# Patient Record
Sex: FEMALE | Race: WHITE | NOT HISPANIC OR LATINO | Employment: FULL TIME | URBAN - METROPOLITAN AREA
[De-identification: names, ages, dates, MRNs, and addresses within clinical notes are randomized per-mention and may not be internally consistent; named-entity substitution may affect disease eponyms.]

---

## 2017-05-26 ENCOUNTER — ALLSCRIPTS OFFICE VISIT (OUTPATIENT)
Dept: OTHER | Facility: OTHER | Age: 57
End: 2017-05-26

## 2017-05-26 DIAGNOSIS — Z12.31 ENCOUNTER FOR SCREENING MAMMOGRAM FOR MALIGNANT NEOPLASM OF BREAST: ICD-10-CM

## 2018-01-14 VITALS
HEIGHT: 66 IN | DIASTOLIC BLOOD PRESSURE: 78 MMHG | WEIGHT: 164 LBS | SYSTOLIC BLOOD PRESSURE: 112 MMHG | BODY MASS INDEX: 26.36 KG/M2

## 2018-02-16 ENCOUNTER — OFFICE VISIT (OUTPATIENT)
Dept: FAMILY MEDICINE CLINIC | Facility: CLINIC | Age: 58
End: 2018-02-16
Payer: COMMERCIAL

## 2018-02-16 ENCOUNTER — APPOINTMENT (OUTPATIENT)
Dept: LAB | Facility: HOSPITAL | Age: 58
End: 2018-02-16
Payer: COMMERCIAL

## 2018-02-16 VITALS
BODY MASS INDEX: 27.5 KG/M2 | HEART RATE: 78 BPM | OXYGEN SATURATION: 96 % | WEIGHT: 167.8 LBS | TEMPERATURE: 98.1 F | SYSTOLIC BLOOD PRESSURE: 122 MMHG | DIASTOLIC BLOOD PRESSURE: 78 MMHG

## 2018-02-16 DIAGNOSIS — J02.9 SORE THROAT: ICD-10-CM

## 2018-02-16 DIAGNOSIS — Z13.1 SCREENING FOR DIABETES MELLITUS: ICD-10-CM

## 2018-02-16 DIAGNOSIS — Z13.220 SCREENING, LIPID: ICD-10-CM

## 2018-02-16 DIAGNOSIS — J02.9 SORE THROAT: Primary | ICD-10-CM

## 2018-02-16 DIAGNOSIS — R63.5 WEIGHT GAIN: ICD-10-CM

## 2018-02-16 DIAGNOSIS — Z12.31 SCREENING MAMMOGRAM, ENCOUNTER FOR: ICD-10-CM

## 2018-02-16 PROCEDURE — 99203 OFFICE O/P NEW LOW 30 MIN: CPT | Performed by: FAMILY MEDICINE

## 2018-02-16 PROCEDURE — 87070 CULTURE OTHR SPECIMN AEROBIC: CPT

## 2018-02-16 RX ORDER — AMOXICILLIN 875 MG/1
TABLET, COATED ORAL
Refills: 0 | COMMUNITY
Start: 2018-02-12 | End: 2018-02-16 | Stop reason: ALTCHOICE

## 2018-02-16 RX ORDER — CEFDINIR 300 MG/1
300 CAPSULE ORAL EVERY 12 HOURS SCHEDULED
Qty: 20 CAPSULE | Refills: 0 | Status: SHIPPED | OUTPATIENT
Start: 2018-02-16 | End: 2018-02-26

## 2018-02-16 NOTE — PROGRESS NOTES
Assessment/Plan:     Diagnoses and all orders for this visit:    Sore throat  -     Throat culture; Future  -     cefdinir (OMNICEF) 300 mg capsule; Take 1 capsule (300 mg total) by mouth every 12 (twelve) hours for 10 days    Screening, lipid  -     Lipid panel; Future    Screening for diabetes mellitus  -     Comprehensive metabolic panel; Future    Weight gain  -     TSH baseline; Future  -     CBC and differential; Future    Screening mammogram, encounter for  -     Mammo screening bilateral w cad; Future    Other orders  -     Discontinue: amoxicillin (AMOXIL) 875 mg tablet; take 1 tablet by mouth twice a day for 10 days      Switch abx to Cefdinir  Use Ibuprofen 600 mg TID  Throat culture  If symptoms persist or continue to recur, will refer to ENT  Screening labs and mammogram    Subjective:      Patient ID: Yissel Colón is a 65 y o  female  She is here to establish care  Per patient, she has a h/o recurrent strep throat  She had an episode was in November 2017  Rapid strep was positive (results in chart from [facility])  She also had strep last year in 2016 - I don't see results in chart  She was seen in Urgent Care this past Monday 2/12/18 and diagnosed with strep again and now is on Amoxicillin 875 mg BID  Reviewed notes - her rapid strep was negative but she was treated empirically  She is complaining of swelling in the throat, painful swallowing, hoarseness  Mucus drainage  Dry throat  No cough  No fever  Denies GERD  Would also like to get blood work updated  Sore Throat    Associated symptoms include congestion  Pertinent negatives include no coughing or shortness of breath         The following portions of the patient's history were reviewed and updated as appropriate: allergies, current medications, past family history, past medical history, past social history, past surgical history and problem list     Review of Systems   Constitutional: Negative for activity change, appetite change, chills and fever  HENT: Positive for congestion, postnasal drip and sore throat  Eyes: Negative  Respiratory: Negative for cough, shortness of breath and wheezing  Hematological: Positive for adenopathy  Objective:      /78   Pulse 78   Temp 98 1 °F (36 7 °C)   Wt 76 1 kg (167 lb 12 8 oz)   SpO2 96%   BMI 27 50 kg/m²          Physical Exam   Constitutional: She is oriented to person, place, and time  She appears well-developed and well-nourished  No distress  HENT:   Right Ear: Hearing, tympanic membrane, external ear and ear canal normal    Left Ear: Hearing, tympanic membrane, external ear and ear canal normal    Nose: Nose normal    Mouth/Throat: Uvula is midline  Posterior oropharyngeal erythema present  No oropharyngeal exudate or posterior oropharyngeal edema  Eyes: Conjunctivae are normal  Pupils are equal, round, and reactive to light  Cardiovascular: Normal rate and normal heart sounds  Exam reveals no gallop and no friction rub  No murmur heard  Pulmonary/Chest: Effort normal and breath sounds normal  No respiratory distress  She has no wheezes  She has no rales  Lymphadenopathy:     She has cervical adenopathy  Neurological: She is alert and oriented to person, place, and time  Skin: Skin is warm  No rash noted  Psychiatric: She has a normal mood and affect  Her behavior is normal  Judgment and thought content normal    Nursing note and vitals reviewed          RAPID STREP A POCT (11/02/2017 4:20 PM)  RAPID STREP A POCT (11/02/2017 4:20 PM)   Component Value Ref Range   Rapid Strep A Screen Positive (A) Negative     RAPID STREP A POCT (02/12/2018 8:48 AM)  RAPID STREP A POCT (02/12/2018 8:48 AM)   Component Value Ref Range   Rapid Strep A Screen Negative Negative

## 2018-02-18 LAB — BACTERIA THROAT CULT: NORMAL

## 2018-03-02 ENCOUNTER — APPOINTMENT (OUTPATIENT)
Dept: LAB | Facility: CLINIC | Age: 58
End: 2018-03-02
Payer: COMMERCIAL

## 2018-03-02 ENCOUNTER — OFFICE VISIT (OUTPATIENT)
Dept: FAMILY MEDICINE CLINIC | Facility: CLINIC | Age: 58
End: 2018-03-02
Payer: COMMERCIAL

## 2018-03-02 VITALS
TEMPERATURE: 97.7 F | OXYGEN SATURATION: 96 % | WEIGHT: 167 LBS | BODY MASS INDEX: 26.84 KG/M2 | SYSTOLIC BLOOD PRESSURE: 118 MMHG | HEART RATE: 80 BPM | DIASTOLIC BLOOD PRESSURE: 78 MMHG | HEIGHT: 66 IN

## 2018-03-02 DIAGNOSIS — N89.8 VAGINAL DISCHARGE: ICD-10-CM

## 2018-03-02 DIAGNOSIS — Z13.1 SCREENING FOR DIABETES MELLITUS: ICD-10-CM

## 2018-03-02 DIAGNOSIS — Z01.419 NORMAL PELVIC EXAM: Primary | ICD-10-CM

## 2018-03-02 DIAGNOSIS — Z13.220 SCREENING, LIPID: ICD-10-CM

## 2018-03-02 DIAGNOSIS — R63.5 WEIGHT GAIN: ICD-10-CM

## 2018-03-02 LAB
ALBUMIN SERPL BCP-MCNC: 3.9 G/DL (ref 3.5–5)
ALP SERPL-CCNC: 76 U/L (ref 46–116)
ALT SERPL W P-5'-P-CCNC: 45 U/L (ref 12–78)
ANION GAP SERPL CALCULATED.3IONS-SCNC: 7 MMOL/L (ref 4–13)
AST SERPL W P-5'-P-CCNC: 16 U/L (ref 5–45)
BASOPHILS # BLD AUTO: 0.04 THOUSANDS/ΜL (ref 0–0.1)
BASOPHILS NFR BLD AUTO: 1 % (ref 0–1)
BILIRUB SERPL-MCNC: 0.42 MG/DL (ref 0.2–1)
BUN SERPL-MCNC: 13 MG/DL (ref 5–25)
CALCIUM SERPL-MCNC: 9.1 MG/DL (ref 8.3–10.1)
CHLORIDE SERPL-SCNC: 109 MMOL/L (ref 100–108)
CHOLEST SERPL-MCNC: 201 MG/DL (ref 50–200)
CO2 SERPL-SCNC: 26 MMOL/L (ref 21–32)
CREAT SERPL-MCNC: 0.66 MG/DL (ref 0.6–1.3)
EOSINOPHIL # BLD AUTO: 0.2 THOUSAND/ΜL (ref 0–0.61)
EOSINOPHIL NFR BLD AUTO: 2 % (ref 0–6)
ERYTHROCYTE [DISTWIDTH] IN BLOOD BY AUTOMATED COUNT: 12.5 % (ref 11.6–15.1)
GFR SERPL CREATININE-BSD FRML MDRD: 99 ML/MIN/1.73SQ M
GLUCOSE P FAST SERPL-MCNC: 87 MG/DL (ref 65–99)
HCT VFR BLD AUTO: 38.1 % (ref 34.8–46.1)
HDLC SERPL-MCNC: 45 MG/DL (ref 40–60)
HGB BLD-MCNC: 12.8 G/DL (ref 11.5–15.4)
LDLC SERPL CALC-MCNC: 131 MG/DL (ref 0–100)
LYMPHOCYTES # BLD AUTO: 2.16 THOUSANDS/ΜL (ref 0.6–4.47)
LYMPHOCYTES NFR BLD AUTO: 26 % (ref 14–44)
MCH RBC QN AUTO: 29.9 PG (ref 26.8–34.3)
MCHC RBC AUTO-ENTMCNC: 33.6 G/DL (ref 31.4–37.4)
MCV RBC AUTO: 89 FL (ref 82–98)
MONOCYTES # BLD AUTO: 0.43 THOUSAND/ΜL (ref 0.17–1.22)
MONOCYTES NFR BLD AUTO: 5 % (ref 4–12)
NEUTROPHILS # BLD AUTO: 5.55 THOUSANDS/ΜL (ref 1.85–7.62)
NEUTS SEG NFR BLD AUTO: 66 % (ref 43–75)
NRBC BLD AUTO-RTO: 0 /100 WBCS
PLATELET # BLD AUTO: 306 THOUSANDS/UL (ref 149–390)
PMV BLD AUTO: 11.2 FL (ref 8.9–12.7)
POTASSIUM SERPL-SCNC: 3.9 MMOL/L (ref 3.5–5.3)
PROT SERPL-MCNC: 7.5 G/DL (ref 6.4–8.2)
RBC # BLD AUTO: 4.28 MILLION/UL (ref 3.81–5.12)
SODIUM SERPL-SCNC: 142 MMOL/L (ref 136–145)
TRIGL SERPL-MCNC: 123 MG/DL
TSH SERPL DL<=0.05 MIU/L-ACNC: 1.19 UIU/ML
WBC # BLD AUTO: 8.43 THOUSAND/UL (ref 4.31–10.16)

## 2018-03-02 PROCEDURE — 99213 OFFICE O/P EST LOW 20 MIN: CPT | Performed by: FAMILY MEDICINE

## 2018-03-02 PROCEDURE — 87070 CULTURE OTHR SPECIMN AEROBIC: CPT | Performed by: FAMILY MEDICINE

## 2018-03-02 PROCEDURE — 80053 COMPREHEN METABOLIC PANEL: CPT

## 2018-03-02 PROCEDURE — 85025 COMPLETE CBC W/AUTO DIFF WBC: CPT

## 2018-03-02 PROCEDURE — 84443 ASSAY THYROID STIM HORMONE: CPT

## 2018-03-02 PROCEDURE — 36415 COLL VENOUS BLD VENIPUNCTURE: CPT

## 2018-03-02 PROCEDURE — 80061 LIPID PANEL: CPT

## 2018-03-02 NOTE — PROGRESS NOTES
Edis Fulton is a 65 y o  female here for a routine exam   Current complaints: none  Personal health questionnaire reviewed: yes  Gynecologic History  No LMP recorded  Contraception: post menopausal status  Last mammogram: year ago  Results were: normal    Obstetric History  OB History   No data available         The following portions of the patient's history were reviewed and updated as appropriate: allergies, current medications, past family history, past medical history, past social history, past surgical history and problem list     Review of Systems  Constitutional: negative  Eyes: negative  Ears, nose, mouth, throat, and face: positive for post nasal drip  Genitourinary:negative  Integument/breast: negative      Objective     /78   Pulse 80   Temp 97 7 °F (36 5 °C)   Ht 5' 6" (1 676 m)   Wt 75 8 kg (167 lb)   SpO2 96%   BMI 26 95 kg/m²   General appearance: alert and oriented, in no acute distress  Throat: lips, mucosa, and tongue normal; teeth and gums normal  Breasts: normal appearance, no masses or tenderness  Abdomen: soft, non-tender; bowel sounds normal; no masses,  no organomegaly  Pelvic: external genitalia normal, no adnexal masses or tenderness, no bladder tenderness, positive findings: vaginal discharge: white and urethral meatus CERVIX SURGICALLY ABSENT    Assessment  Eva Pratt was seen today for gynecologic exam     Diagnoses and all orders for this visit:    Normal pelvic exam    Vaginal discharge  -     Genital Comprehensive Culture; Future    Pelvic exam normal  Sent culture  No pap (cervix surgically absent)  Has mammogram order  Healthy female exam       Plan     Education reviewed: safe sex/STD prevention and self breast exams  Mammogram ordered  Follow up in: 1 years

## 2018-03-04 LAB — BACTERIA GENITAL AEROBE CULT: NORMAL

## 2018-07-06 ENCOUNTER — TRANSCRIBE ORDERS (OUTPATIENT)
Dept: ADMINISTRATIVE | Facility: HOSPITAL | Age: 58
End: 2018-07-06

## 2018-07-06 ENCOUNTER — OFFICE VISIT (OUTPATIENT)
Dept: FAMILY MEDICINE CLINIC | Facility: CLINIC | Age: 58
End: 2018-07-06
Payer: COMMERCIAL

## 2018-07-06 ENCOUNTER — APPOINTMENT (OUTPATIENT)
Dept: RADIOLOGY | Facility: CLINIC | Age: 58
End: 2018-07-06
Payer: COMMERCIAL

## 2018-07-06 VITALS
TEMPERATURE: 97.8 F | HEIGHT: 66 IN | WEIGHT: 165 LBS | HEART RATE: 72 BPM | OXYGEN SATURATION: 95 % | BODY MASS INDEX: 26.52 KG/M2 | DIASTOLIC BLOOD PRESSURE: 86 MMHG | SYSTOLIC BLOOD PRESSURE: 126 MMHG

## 2018-07-06 DIAGNOSIS — Z12.11 COLON CANCER SCREENING: ICD-10-CM

## 2018-07-06 DIAGNOSIS — M54.5 CHRONIC BILATERAL LOW BACK PAIN, WITH SCIATICA PRESENCE UNSPECIFIED: ICD-10-CM

## 2018-07-06 DIAGNOSIS — G89.29 CHRONIC BILATERAL LOW BACK PAIN, WITH SCIATICA PRESENCE UNSPECIFIED: ICD-10-CM

## 2018-07-06 DIAGNOSIS — M41.9 SCOLIOSIS OF THORACIC SPINE, UNSPECIFIED SCOLIOSIS TYPE: ICD-10-CM

## 2018-07-06 DIAGNOSIS — F32.89 OTHER DEPRESSION: ICD-10-CM

## 2018-07-06 DIAGNOSIS — Z12.31 SCREENING MAMMOGRAM, ENCOUNTER FOR: ICD-10-CM

## 2018-07-06 DIAGNOSIS — M79.604 RIGHT LEG PAIN: Primary | ICD-10-CM

## 2018-07-06 DIAGNOSIS — M79.604 RIGHT LEG PAIN: ICD-10-CM

## 2018-07-06 PROBLEM — Z01.419 NORMAL PELVIC EXAM: Status: RESOLVED | Noted: 2018-03-02 | Resolved: 2018-07-06

## 2018-07-06 PROBLEM — N89.8 VAGINAL DISCHARGE: Status: RESOLVED | Noted: 2018-03-02 | Resolved: 2018-07-06

## 2018-07-06 PROBLEM — M54.50 CHRONIC BILATERAL LOW BACK PAIN: Status: ACTIVE | Noted: 2018-07-06

## 2018-07-06 PROBLEM — F32.A DEPRESSION: Status: ACTIVE | Noted: 2018-07-06

## 2018-07-06 PROCEDURE — 72072 X-RAY EXAM THORAC SPINE 3VWS: CPT

## 2018-07-06 PROCEDURE — 99214 OFFICE O/P EST MOD 30 MIN: CPT | Performed by: FAMILY MEDICINE

## 2018-07-06 PROCEDURE — 3008F BODY MASS INDEX DOCD: CPT | Performed by: FAMILY MEDICINE

## 2018-07-06 PROCEDURE — 72110 X-RAY EXAM L-2 SPINE 4/>VWS: CPT

## 2018-07-06 RX ORDER — CITALOPRAM 20 MG/1
20 TABLET ORAL DAILY
Qty: 30 TABLET | Refills: 2 | Status: SHIPPED | OUTPATIENT
Start: 2018-07-06

## 2018-07-06 RX ORDER — MELOXICAM 7.5 MG/1
7.5 TABLET ORAL DAILY
Qty: 30 TABLET | Refills: 0 | Status: SHIPPED | OUTPATIENT
Start: 2018-07-06 | End: 2019-08-12

## 2018-07-06 NOTE — PROGRESS NOTES
Assessment/Plan:     Diagnoses and all orders for this visit:    Right leg pain  -     XR spine thoracic 3 vw; Future  -     XR spine lumbar minimum 4 views non injury; Future  -     meloxicam (MOBIC) 7 5 mg tablet; Take 1 tablet (7 5 mg total) by mouth daily  -     Ambulatory referral to Physical Therapy; Future    Scoliosis of thoracic spine, unspecified scoliosis type  -     XR spine thoracic 3 vw; Future  -     XR spine lumbar minimum 4 views non injury; Future  -     Ambulatory referral to Physical Therapy; Future    Other depression  -     citalopram (CeleXA) 20 mg tablet; Take 1 tablet (20 mg total) by mouth daily    Colon cancer screening  -     Ambulatory referral to Colorectal Surgery; Future    Screening mammogram, encounter for  -     Mammo screening bilateral w 3d & cad; Future    Chronic bilateral low back pain, with sciatica presence unspecified  -     meloxicam (MOBIC) 7 5 mg tablet; Take 1 tablet (7 5 mg total) by mouth daily  -     Ambulatory referral to Physical Therapy; Future        Check Xrays, start Mobic and PT  Trial of conservative therapy if not improving may need to refer to ortho/spine and pain  Restart Celexa for depression  She can cut in half for a week to take 10 mg then increase to 20 mg  Subjective:      Patient ID: Leann Hines is a 65 y o  female  She states she has "leg pain from scoliosis"  She states she was diagnosed with scoliosis as a child and wore a brace  Now having R leg pain, occurs with weight bearing or at rest   Starts at the hip and radiates down to the ankle  She has lower back pain as well  She describes the pain as a "constant dull ache"  No numbness or tingling  Constant pain for 1 month  She has tried stretching which seems to make it worse  Tried ibuprofen which helps the back but not the leg  She would also like to go back on Celexa - she stopped it 15 months ago  She states her depression has been worse lately   She had been doing well up until recently  No suicidal/homicidal ideation          The following portions of the patient's history were reviewed and updated as appropriate: She  has a past medical history of Scoliosis; Strep throat; and Weight gain  She   Patient Active Problem List    Diagnosis Date Noted    Chronic bilateral low back pain 07/06/2018    Colon cancer screening 07/06/2018    Depression 07/06/2018    Scoliosis of thoracic spine 07/06/2018    Right leg pain 07/06/2018     She  has a past surgical history that includes Partial hysterectomy; Tubal ligation; Hysterectomy; and Breast lumpectomy  Her family history includes Multiple myeloma in her father; Thyroid cancer in her mother  She  reports that she has never smoked  She has never used smokeless tobacco  She reports that she drinks alcohol  She reports that she does not use drugs  Current Outpatient Prescriptions   Medication Sig Dispense Refill    citalopram (CeleXA) 20 mg tablet Take 1 tablet (20 mg total) by mouth daily 30 tablet 2    meloxicam (MOBIC) 7 5 mg tablet Take 1 tablet (7 5 mg total) by mouth daily 30 tablet 0     No current facility-administered medications for this visit  No current outpatient prescriptions on file prior to visit  No current facility-administered medications on file prior to visit  She is allergic to prednisone       Review of Systems   Constitutional: Negative for activity change  Respiratory: Negative for chest tightness and shortness of breath  Cardiovascular: Negative for chest pain and leg swelling  Musculoskeletal: Positive for back pain  Right leg pain   Neurological: Negative for headaches  Psychiatric/Behavioral: Positive for dysphoric mood  The patient is not nervous/anxious            Objective:      /86   Pulse 72   Temp 97 8 °F (36 6 °C)   Ht 5' 6" (1 676 m)   Wt 74 8 kg (165 lb)   SpO2 95%   BMI 26 63 kg/m²          Physical Exam   Constitutional: She is oriented to person, place, and time  She appears well-developed and well-nourished  No distress  Musculoskeletal:        Right hip: She exhibits normal range of motion, normal strength and no tenderness  Left hip: She exhibits normal range of motion, normal strength and no tenderness  Thoracic back: She exhibits deformity, pain and spasm  Lumbar back: She exhibits deformity and pain  She exhibits normal range of motion  Back:    Neurological: She is alert and oriented to person, place, and time  Skin: She is not diaphoretic  Psychiatric: She has a normal mood and affect  Her speech is normal and behavior is normal  Judgment and thought content normal  Cognition and memory are normal    Nursing note and vitals reviewed

## 2018-07-10 DIAGNOSIS — M41.25 OTHER IDIOPATHIC SCOLIOSIS, THORACOLUMBAR REGION: Primary | ICD-10-CM

## 2018-07-10 NOTE — PROGRESS NOTES
Pt aware, she took the info for this spine surgeon but will call back if she chooses to go somewhere else

## 2018-07-21 ENCOUNTER — OFFICE VISIT (OUTPATIENT)
Dept: URGENT CARE | Facility: CLINIC | Age: 58
End: 2018-07-21
Payer: COMMERCIAL

## 2018-07-21 VITALS
HEART RATE: 65 BPM | RESPIRATION RATE: 18 BRPM | OXYGEN SATURATION: 98 % | BODY MASS INDEX: 26.68 KG/M2 | TEMPERATURE: 97.2 F | SYSTOLIC BLOOD PRESSURE: 104 MMHG | HEIGHT: 66 IN | WEIGHT: 166 LBS | DIASTOLIC BLOOD PRESSURE: 62 MMHG

## 2018-07-21 DIAGNOSIS — J02.9 SORE THROAT: Primary | ICD-10-CM

## 2018-07-21 LAB — S PYO AG THROAT QL: NEGATIVE

## 2018-07-21 PROCEDURE — 87430 STREP A AG IA: CPT | Performed by: PHYSICIAN ASSISTANT

## 2018-07-21 PROCEDURE — G0382 LEV 3 HOSP TYPE B ED VISIT: HCPCS | Performed by: PHYSICIAN ASSISTANT

## 2018-07-21 PROCEDURE — 87147 CULTURE TYPE IMMUNOLOGIC: CPT | Performed by: PHYSICIAN ASSISTANT

## 2018-07-21 PROCEDURE — 87070 CULTURE OTHR SPECIMN AEROBIC: CPT | Performed by: PHYSICIAN ASSISTANT

## 2018-07-21 NOTE — PATIENT INSTRUCTIONS
Negative rapid strep  We will check a culture and call if it is positive  Tylenol and motrin alternating  Salt water gargles  May be viral    Follow up with PCP in 3-5 days  Proceed to  ER if symptoms worsen

## 2018-07-21 NOTE — PROGRESS NOTES
[facility]        NAME: James Sosa is a 65 y o  female  : 1960    MRN: [id number]  DATE: 2018  TIME: 12:30 PM    Assessment and Plan   Sore throat [J02 9]  1  Sore throat  POCT rapid strepA    Throat culture         Patient Instructions     Negative rapid strep  We will check a culture and call if it is positive  Tylenol and motrin alternating  Salt water gargles  May be viral    Follow up with PCP in 3-5 days  Proceed to  ER if symptoms worsen  Chief Complaint     Chief Complaint   Patient presents with    Sore Throat     x5 days         History of Present Illness       [de-identified] year old female complains of sore throat for 5 days  No fever at home  No medicines over-the-counter for this  No cough runny nose  Tolerating p o  Review of Systems   Review of Systems   Constitutional: Negative for chills, fatigue and fever  HENT: Positive for sore throat  Eyes: Negative for visual disturbance  Respiratory: Negative for chest tightness and shortness of breath  Cardiovascular: Negative for chest pain and palpitations  Gastrointestinal: Negative for diarrhea, nausea and vomiting  Neurological: Negative for dizziness           Current Medications       Current Outpatient Prescriptions:     citalopram (CeleXA) 20 mg tablet, Take 1 tablet (20 mg total) by mouth daily, Disp: 30 tablet, Rfl: 2    meloxicam (MOBIC) 7 5 mg tablet, Take 1 tablet (7 5 mg total) by mouth daily, Disp: 30 tablet, Rfl: 0    Current Allergies     Allergies as of 2018 - Reviewed 2018   Allergen Reaction Noted    Prednisone  2011            The following portions of the patient's history were reviewed and updated as appropriate: allergies, current medications, past family history, past medical history, past social history, past surgical history and problem list      Past Medical History:   Diagnosis Date    Scoliosis     Strep throat     Weight gain        Past Surgical History:   Procedure Laterality Date    BREAST LUMPECTOMY      HYSTERECTOMY      PARTIAL HYSTERECTOMY      TUBAL LIGATION         Family History   Problem Relation Age of Onset    Thyroid cancer Mother     Multiple myeloma Father          Medications have been verified  Objective   /62 (BP Location: Left arm, Patient Position: Sitting)   Pulse 65   Temp (!) 97 2 °F (36 2 °C) (Tympanic)   Resp 18   Ht 5' 6" (1 676 m)   Wt 75 3 kg (166 lb)   SpO2 98%   BMI 26 79 kg/m²        Physical Exam     Physical Exam   Constitutional: She appears well-developed and well-nourished  No distress  HENT:   Right Ear: Tympanic membrane, external ear and ear canal normal    Left Ear: Tympanic membrane, external ear and ear canal normal    Nose: Nose normal  Right sinus exhibits no maxillary sinus tenderness and no frontal sinus tenderness  Left sinus exhibits no maxillary sinus tenderness and no frontal sinus tenderness  Mouth/Throat: Oropharynx is clear and moist  No posterior oropharyngeal erythema  Eyes: Conjunctivae and EOM are normal  Pupils are equal, round, and reactive to light  No scleral icterus  Neck: Normal range of motion  Neck supple  Cardiovascular: Normal rate, regular rhythm and normal heart sounds  Pulmonary/Chest: Effort normal and breath sounds normal  No respiratory distress  She has no wheezes  She has no rales  Abdominal: Soft  Bowel sounds are normal  She exhibits no distension and no mass  There is no tenderness  There is no rebound and no guarding  Lymphadenopathy:     She has cervical adenopathy  Right cervical: Superficial cervical adenopathy present  No deep cervical adenopathy present  Left cervical: Superficial cervical adenopathy present  No deep cervical adenopathy present  Skin: Skin is warm and dry  No rash noted

## 2018-07-24 LAB — BACTERIA THROAT CULT: ABNORMAL

## 2018-07-25 ENCOUNTER — TELEPHONE (OUTPATIENT)
Dept: URGENT CARE | Facility: CLINIC | Age: 58
End: 2018-07-25

## 2018-07-25 DIAGNOSIS — J02.9 SORE THROAT: Primary | ICD-10-CM

## 2018-07-25 RX ORDER — AMOXICILLIN 500 MG/1
500 TABLET, FILM COATED ORAL 3 TIMES DAILY
Qty: 30 TABLET | Refills: 0 | Status: SHIPPED | OUTPATIENT
Start: 2018-07-25 | End: 2018-08-04

## 2018-07-25 NOTE — PROGRESS NOTES
Pt called saying sore throat is worse again  Positive strep culture atypical  Will call in rx for amox

## 2018-08-17 ENCOUNTER — HOSPITAL ENCOUNTER (OUTPATIENT)
Dept: MAMMOGRAPHY | Facility: HOSPITAL | Age: 58
Discharge: HOME | End: 2018-08-17
Payer: COMMERCIAL

## 2018-08-17 DIAGNOSIS — Z12.31 SCREENING MAMMOGRAM, ENCOUNTER FOR: ICD-10-CM

## 2018-08-17 PROCEDURE — 77067 SCR MAMMO BI INCL CAD: CPT

## 2018-08-17 PROCEDURE — 77063 BREAST TOMOSYNTHESIS BI: CPT

## 2018-08-21 DIAGNOSIS — R92.8 ABNORMAL MAMMOGRAM: Primary | ICD-10-CM

## 2019-01-07 DIAGNOSIS — Z12.11 SCREEN FOR COLON CANCER: Primary | ICD-10-CM

## 2019-04-23 ENCOUNTER — TELEPHONE (OUTPATIENT)
Dept: GASTROENTEROLOGY | Facility: CLINIC | Age: 59
End: 2019-04-23

## 2019-04-23 PROBLEM — Z12.11 SCREENING FOR COLON CANCER: Status: ACTIVE | Noted: 2019-04-23

## 2019-06-04 ENCOUNTER — TELEPHONE (OUTPATIENT)
Dept: GASTROENTEROLOGY | Facility: CLINIC | Age: 59
End: 2019-06-04

## 2019-06-06 ENCOUNTER — ANESTHESIA EVENT (OUTPATIENT)
Dept: GASTROENTEROLOGY | Facility: HOSPITAL | Age: 59
End: 2019-06-06

## 2019-06-07 ENCOUNTER — TELEPHONE (OUTPATIENT)
Dept: GASTROENTEROLOGY | Facility: CLINIC | Age: 59
End: 2019-06-07

## 2019-06-07 ENCOUNTER — HOSPITAL ENCOUNTER (OUTPATIENT)
Dept: GASTROENTEROLOGY | Facility: HOSPITAL | Age: 59
Setting detail: OUTPATIENT SURGERY
Discharge: HOME | End: 2019-06-07
Attending: INTERNAL MEDICINE | Admitting: INTERNAL MEDICINE
Payer: COMMERCIAL

## 2019-06-07 ENCOUNTER — ANESTHESIA (OUTPATIENT)
Dept: GASTROENTEROLOGY | Facility: HOSPITAL | Age: 59
End: 2019-06-07

## 2019-06-07 VITALS
DIASTOLIC BLOOD PRESSURE: 61 MMHG | OXYGEN SATURATION: 94 % | BODY MASS INDEX: 24.23 KG/M2 | RESPIRATION RATE: 16 BRPM | SYSTOLIC BLOOD PRESSURE: 103 MMHG | WEIGHT: 150.79 LBS | HEART RATE: 61 BPM | TEMPERATURE: 97.5 F | HEIGHT: 66 IN

## 2019-06-07 DIAGNOSIS — Z12.11 ENCOUNTER FOR SCREENING FOR MALIGNANT NEOPLASM OF COLON: ICD-10-CM

## 2019-06-07 DIAGNOSIS — K58.0 IRRITABLE BOWEL SYNDROME WITH DIARRHEA: Primary | ICD-10-CM

## 2019-06-07 DIAGNOSIS — R10.84 GENERALIZED ABDOMINAL PAIN: ICD-10-CM

## 2019-06-07 PROCEDURE — 45380 COLONOSCOPY AND BIOPSY: CPT | Performed by: INTERNAL MEDICINE

## 2019-06-07 PROCEDURE — 88342 IMHCHEM/IMCYTCHM 1ST ANTB: CPT | Performed by: PATHOLOGY

## 2019-06-07 PROCEDURE — 88341 IMHCHEM/IMCYTCHM EA ADD ANTB: CPT | Performed by: PATHOLOGY

## 2019-06-07 PROCEDURE — 88305 TISSUE EXAM BY PATHOLOGIST: CPT | Performed by: PATHOLOGY

## 2019-06-07 RX ORDER — PROPOFOL 10 MG/ML
INJECTION, EMULSION INTRAVENOUS AS NEEDED
Status: DISCONTINUED | OUTPATIENT
Start: 2019-06-07 | End: 2019-06-07 | Stop reason: SURG

## 2019-06-07 RX ORDER — SODIUM CHLORIDE, SODIUM LACTATE, POTASSIUM CHLORIDE, CALCIUM CHLORIDE 600; 310; 30; 20 MG/100ML; MG/100ML; MG/100ML; MG/100ML
125 INJECTION, SOLUTION INTRAVENOUS CONTINUOUS
Status: DISCONTINUED | OUTPATIENT
Start: 2019-06-07 | End: 2019-06-11 | Stop reason: HOSPADM

## 2019-06-07 RX ADMIN — PROPOFOL 20 MG: 10 INJECTION, EMULSION INTRAVENOUS at 12:48

## 2019-06-07 RX ADMIN — PROPOFOL 50 MG: 10 INJECTION, EMULSION INTRAVENOUS at 12:44

## 2019-06-07 RX ADMIN — PROPOFOL 50 MG: 10 INJECTION, EMULSION INTRAVENOUS at 12:40

## 2019-06-07 RX ADMIN — SODIUM CHLORIDE, SODIUM LACTATE, POTASSIUM CHLORIDE, AND CALCIUM CHLORIDE: .6; .31; .03; .02 INJECTION, SOLUTION INTRAVENOUS at 12:20

## 2019-06-07 RX ADMIN — PROPOFOL 100 MG: 10 INJECTION, EMULSION INTRAVENOUS at 12:36

## 2019-06-07 RX ADMIN — SODIUM CHLORIDE, SODIUM LACTATE, POTASSIUM CHLORIDE, AND CALCIUM CHLORIDE 125 ML/HR: .6; .31; .03; .02 INJECTION, SOLUTION INTRAVENOUS at 12:00

## 2019-06-11 ENCOUNTER — TELEPHONE (OUTPATIENT)
Dept: GASTROENTEROLOGY | Facility: CLINIC | Age: 59
End: 2019-06-11

## 2019-06-11 DIAGNOSIS — D3A.012 CARCINOID TUMOR OF ILEUM: Primary | ICD-10-CM

## 2019-06-13 ENCOUNTER — TELEPHONE (OUTPATIENT)
Dept: GASTROENTEROLOGY | Facility: CLINIC | Age: 59
End: 2019-06-13

## 2019-06-18 ENCOUNTER — HOSPITAL ENCOUNTER (OUTPATIENT)
Dept: CT IMAGING | Facility: HOSPITAL | Age: 59
Discharge: HOME | End: 2019-06-18
Payer: COMMERCIAL

## 2019-06-18 ENCOUNTER — TELEPHONE (OUTPATIENT)
Dept: GASTROENTEROLOGY | Facility: CLINIC | Age: 59
End: 2019-06-18

## 2019-06-18 DIAGNOSIS — D3A.012 CARCINOID TUMOR OF ILEUM: ICD-10-CM

## 2019-06-18 PROCEDURE — 74177 CT ABD & PELVIS W/CONTRAST: CPT

## 2019-06-18 RX ADMIN — IOHEXOL 100 ML: 350 INJECTION, SOLUTION INTRAVENOUS at 18:22

## 2019-06-19 ENCOUNTER — TELEPHONE (OUTPATIENT)
Dept: GASTROENTEROLOGY | Facility: CLINIC | Age: 59
End: 2019-06-19

## 2019-06-20 ENCOUNTER — TELEPHONE (OUTPATIENT)
Dept: GASTROENTEROLOGY | Facility: CLINIC | Age: 59
End: 2019-06-20

## 2019-06-20 DIAGNOSIS — D3A.012 CARCINOID TUMOR OF ILEUM: Primary | ICD-10-CM

## 2019-06-20 DIAGNOSIS — D3A.019 CARCINOID TUMOR DETERMINED BY BIOPSY OF SMALL INTESTINE: Primary | ICD-10-CM

## 2019-06-21 ENCOUNTER — TELEPHONE (OUTPATIENT)
Dept: GASTROENTEROLOGY | Facility: CLINIC | Age: 59
End: 2019-06-21

## 2019-06-22 ENCOUNTER — APPOINTMENT (OUTPATIENT)
Dept: LAB | Facility: HOSPITAL | Age: 59
End: 2019-06-22
Payer: COMMERCIAL

## 2019-06-22 DIAGNOSIS — D3A.019 CARCINOID TUMOR DETERMINED BY BIOPSY OF SMALL INTESTINE: ICD-10-CM

## 2019-06-22 PROCEDURE — 83497 ASSAY OF 5-HIAA: CPT

## 2019-06-24 ENCOUNTER — HOSPITAL ENCOUNTER (OUTPATIENT)
Dept: RADIOLOGY | Age: 59
Discharge: HOME | End: 2019-06-24
Payer: COMMERCIAL

## 2019-06-24 DIAGNOSIS — D3A.012 CARCINOID TUMOR OF ILEUM: ICD-10-CM

## 2019-06-24 LAB — GLUCOSE SERPL-MCNC: 95 MG/DL (ref 65–140)

## 2019-06-24 PROCEDURE — A9552 F18 FDG: HCPCS

## 2019-06-24 PROCEDURE — 82948 REAGENT STRIP/BLOOD GLUCOSE: CPT

## 2019-06-24 PROCEDURE — 78815 PET IMAGE W/CT SKULL-THIGH: CPT

## 2019-06-25 ENCOUNTER — OFFICE VISIT (OUTPATIENT)
Dept: GASTROENTEROLOGY | Facility: CLINIC | Age: 59
End: 2019-06-25
Payer: COMMERCIAL

## 2019-06-25 ENCOUNTER — TELEPHONE (OUTPATIENT)
Dept: GASTROENTEROLOGY | Facility: CLINIC | Age: 59
End: 2019-06-25

## 2019-06-25 VITALS
BODY MASS INDEX: 25.24 KG/M2 | WEIGHT: 156.38 LBS | HEART RATE: 73 BPM | DIASTOLIC BLOOD PRESSURE: 80 MMHG | SYSTOLIC BLOOD PRESSURE: 114 MMHG

## 2019-06-25 DIAGNOSIS — R19.7 DIARRHEA, UNSPECIFIED TYPE: ICD-10-CM

## 2019-06-25 DIAGNOSIS — D3A.012 CARCINOID TUMOR OF ILEUM: Primary | ICD-10-CM

## 2019-06-25 PROCEDURE — 99214 OFFICE O/P EST MOD 30 MIN: CPT | Performed by: INTERNAL MEDICINE

## 2019-06-26 ENCOUNTER — TELEPHONE (OUTPATIENT)
Dept: GASTROENTEROLOGY | Facility: CLINIC | Age: 59
End: 2019-06-26

## 2019-06-26 DIAGNOSIS — D3A.012 CARCINOID TUMOR OF ILEUM: Primary | ICD-10-CM

## 2019-06-26 LAB
5OH-INDOLEACETATE 24H UR-MCNC: 8.7 MG/L
5OH-INDOLEACETATE 24H UR-MRATE: 11.3 MG/24 HR (ref 0–14.9)

## 2019-07-02 ENCOUNTER — APPOINTMENT (OUTPATIENT)
Dept: LAB | Facility: CLINIC | Age: 59
End: 2019-07-02
Payer: COMMERCIAL

## 2019-07-02 ENCOUNTER — CONSULT (OUTPATIENT)
Dept: HEMATOLOGY ONCOLOGY | Facility: CLINIC | Age: 59
End: 2019-07-02
Payer: COMMERCIAL

## 2019-07-02 VITALS
OXYGEN SATURATION: 97 % | RESPIRATION RATE: 16 BRPM | HEART RATE: 68 BPM | TEMPERATURE: 97.7 F | WEIGHT: 160 LBS | BODY MASS INDEX: 25.71 KG/M2 | HEIGHT: 66 IN | DIASTOLIC BLOOD PRESSURE: 62 MMHG | SYSTOLIC BLOOD PRESSURE: 140 MMHG

## 2019-07-02 DIAGNOSIS — D3A.012 CARCINOID TUMOR OF ILEUM: ICD-10-CM

## 2019-07-02 LAB
ALBUMIN SERPL BCP-MCNC: 3.7 G/DL (ref 3.5–5)
ALP SERPL-CCNC: 70 U/L (ref 46–116)
ALT SERPL W P-5'-P-CCNC: 72 U/L (ref 12–78)
ANION GAP SERPL CALCULATED.3IONS-SCNC: 9 MMOL/L (ref 4–13)
AST SERPL W P-5'-P-CCNC: 30 U/L (ref 5–45)
BASOPHILS # BLD AUTO: 0.05 THOUSANDS/ΜL (ref 0–0.1)
BASOPHILS NFR BLD AUTO: 1 % (ref 0–1)
BILIRUB SERPL-MCNC: 0.3 MG/DL (ref 0.2–1)
BUN SERPL-MCNC: 14 MG/DL (ref 5–25)
CALCIUM SERPL-MCNC: 9 MG/DL (ref 8.3–10.1)
CHLORIDE SERPL-SCNC: 106 MMOL/L (ref 100–108)
CO2 SERPL-SCNC: 28 MMOL/L (ref 21–32)
CREAT SERPL-MCNC: 0.74 MG/DL (ref 0.6–1.3)
EOSINOPHIL # BLD AUTO: 0.13 THOUSAND/ΜL (ref 0–0.61)
EOSINOPHIL NFR BLD AUTO: 2 % (ref 0–6)
ERYTHROCYTE [DISTWIDTH] IN BLOOD BY AUTOMATED COUNT: 11.9 % (ref 11.6–15.1)
GFR SERPL CREATININE-BSD FRML MDRD: 90 ML/MIN/1.73SQ M
GLUCOSE SERPL-MCNC: 104 MG/DL (ref 65–140)
HCT VFR BLD AUTO: 36.6 % (ref 34.8–46.1)
HGB BLD-MCNC: 12.3 G/DL (ref 11.5–15.4)
IMM GRANULOCYTES # BLD AUTO: 0.05 THOUSAND/UL (ref 0–0.2)
IMM GRANULOCYTES NFR BLD AUTO: 1 % (ref 0–2)
LYMPHOCYTES # BLD AUTO: 1.58 THOUSANDS/ΜL (ref 0.6–4.47)
LYMPHOCYTES NFR BLD AUTO: 22 % (ref 14–44)
MCH RBC QN AUTO: 30.9 PG (ref 26.8–34.3)
MCHC RBC AUTO-ENTMCNC: 33.6 G/DL (ref 31.4–37.4)
MCV RBC AUTO: 92 FL (ref 82–98)
MONOCYTES # BLD AUTO: 0.39 THOUSAND/ΜL (ref 0.17–1.22)
MONOCYTES NFR BLD AUTO: 6 % (ref 4–12)
NEUTROPHILS # BLD AUTO: 4.85 THOUSANDS/ΜL (ref 1.85–7.62)
NEUTS SEG NFR BLD AUTO: 68 % (ref 43–75)
NRBC BLD AUTO-RTO: 0 /100 WBCS
PLATELET # BLD AUTO: 230 THOUSANDS/UL (ref 149–390)
PMV BLD AUTO: 10.7 FL (ref 8.9–12.7)
POTASSIUM SERPL-SCNC: 4 MMOL/L (ref 3.5–5.3)
PROT SERPL-MCNC: 7.1 G/DL (ref 6.4–8.2)
RBC # BLD AUTO: 3.98 MILLION/UL (ref 3.81–5.12)
SODIUM SERPL-SCNC: 143 MMOL/L (ref 136–145)
WBC # BLD AUTO: 7.05 THOUSAND/UL (ref 4.31–10.16)

## 2019-07-02 PROCEDURE — 84307 ASSAY OF SOMATOSTATIN: CPT

## 2019-07-02 PROCEDURE — 85025 COMPLETE CBC W/AUTO DIFF WBC: CPT | Performed by: INTERNAL MEDICINE

## 2019-07-02 PROCEDURE — 99243 OFF/OP CNSLTJ NEW/EST LOW 30: CPT | Performed by: INTERNAL MEDICINE

## 2019-07-02 PROCEDURE — 36415 COLL VENOUS BLD VENIPUNCTURE: CPT | Performed by: INTERNAL MEDICINE

## 2019-07-02 PROCEDURE — 86316 IMMUNOASSAY TUMOR OTHER: CPT | Performed by: INTERNAL MEDICINE

## 2019-07-02 PROCEDURE — 80053 COMPREHEN METABOLIC PANEL: CPT | Performed by: INTERNAL MEDICINE

## 2019-07-03 NOTE — PROGRESS NOTES
HEMATOLOGY / ONCOLOGY CLINIC NOTE    Primary Care Provider: Sakshi Maki MD  Referring Provider: Guerita Christian  MRN: [id number]  : 1960    Reason for Encounter:  Chief Complaint   Patient presents with    Consult         History of Hematology / Oncology Illness:     James Sosa is a 63 y o  female who came in  to establish care with oncology    1, GI carcinoid tumor  - presented with diarrhea ongoing for 2 years  Colonoscopy showed polyps, biopsy showed GI carcinoid tumor     - Ki 67 less than 3 percent  Low to intermediate grade    -  PET-CT showed intra-abdominal lymphadenopathy  Octreotide scan to be done  Assessment / Plan:         1  Carcinoid tumor of ileum  - agree with proceed with octreotide scan  Depending result, if there is no distant metastatic disease, will referred to Surgical Oncology for consider tumor resection  Usually no need additional adjuvant treatment other than observation  If it shows metastatic disease, will consider Sandostatin injection       - Ambulatory referral to Hematology / Oncology  - CBC and differential  - Comprehensive metabolic panel  - Chromogranin A  - Somatostatin; Future        2  Diarrhea  - patient can continue to take Imodium  Unfortunately we cannot give Sandostatin injection as this will affect result  Made patient aware regarding side effects of chemotherapy, including but not limited to fatigue cytopenia, increased risk for infection, potential kidney, liver injuries neuropathies et al    Made patient aware to call MD or go to ED for any fever,  Chills, bleeding, easy bruise, unhealed wound, chest pain, abdominal pain et al                 45       minutes were spent face to face with patient with greater than 50% of the time spent in counseling or coordination of care including discussions of treatment instructions  All of the patient's questions were answered to their satisfactory during this discussion  Advised pt to call if there is any further questions  Interval History:      7/2/2019 :  Patient is a 21-year-old female, with history of depression, chronic diarrhea, recently diagnosed GI neuroendocrine tumor  [facility] for [facility]anagement  Patient reported other than diarrhea, she has no flushing, no dyspnea, no abdominal pain  She has no other malignancies in the past, her diarrhea is about 3 to 4 times a day watery usually after meal   She has been evaluated by GI, her diarrhea is considered due to combination of IBS and carcinoid tumor  Patient is a nonsmoker, drinks alcohol socially  Problem list:     Patient Active Problem List   Diagnosis    Chronic bilateral low back pain    Colon cancer screening    Depression    Scoliosis of thoracic spine    Right leg pain    Screening for colon cancer    Carcinoid tumor of ileum    Diarrhea         PHYSICIAL EXAMINATION:     Vital Signs:   [unfilled]  Body mass index is 25 82 kg/m²  Body surface area is 1 82 meters squared  No major finding on physical examination  GEN: Alert, awake oriented x3, in no acute distress  HEENT- No pallor, icterus, cyanosis, no oral mucosal lesions,   LAD - no palpable cervical, clavicle, axillary, inguinal LAD  Heart- normal S1 S2, regular rate and rhythm, No murmur, rubs  Lungs- decreased breathing sound bilateral    Abdomen- soft, Non tender, bowel sounds present  Extremities- No cyanosis, clubbing, edema  Neuro- No focal neurological deficit           PAST MEDICAL HISTORY:   has a past medical history of Psychiatric disorder, Scoliosis, Strep throat, and Weight gain  PAST SURGICAL HISTORY:   has a past surgical history that includes Partial hysterectomy; Tubal ligation; Hysterectomy; Breast lumpectomy; and Breast biopsy      CURRENT MEDICATIONS:   Current Outpatient Medications   Medication Sig Dispense Refill    Brexpiprazole (REXULTI) 0 5 MG tablet Take 0 5 mg by mouth daily      citalopram (CeleXA) 20 mg tablet Take 1 tablet (20 mg total) by mouth daily 30 tablet 2    meloxicam (MOBIC) 7 5 mg tablet Take 1 tablet (7 5 mg total) by mouth daily 30 tablet 0     No current facility-administered medications for this visit  [unfilled]    SOCIAL HISTORY:   reports that she has never smoked  She has never used smokeless tobacco  She reports that she drinks about 2 0 standard drinks of alcohol per week  She reports that she does not use drugs  FAMILY HISTORY:  family history includes Multiple myeloma in her father; Thyroid cancer in her mother  ALLERGIES:  is allergic to prednisone  REVIEW OF SYSTEMS:  Please note that a 14-point review of systems was performed to include Constitutional, HEENT, Respiratory, CVS, GI, , Musculoskeletal, Integumentary, Neurologic, Rheumatologic, Endocrinologic, Psychiatric, Lymphatic, and Hematologic/Oncologic systems were reviewed and are negative unless otherwise stated in HPI  Positive and negative findings pertinent to this evaluation are incorporated into the history of present illness              LAB:  Lab Results   Component Value Date    WBC 7 05 07/02/2019    HGB 12 3 07/02/2019    HCT 36 6 07/02/2019    MCV 92 07/02/2019     07/02/2019     Lab Results   Component Value Date    SODIUM 143 07/02/2019    K 4 0 07/02/2019     07/02/2019    CO2 28 07/02/2019    AGAP 9 07/02/2019    BUN 14 07/02/2019    CREATININE 0 74 07/02/2019    GLUC 104 07/02/2019    GLUF 87 03/02/2018    CALCIUM 9 0 07/02/2019    AST 30 07/02/2019    ALT 72 07/02/2019    ALKPHOS 70 07/02/2019    TP 7 1 07/02/2019    TBILI 0 30 07/02/2019    EGFR 90 07/02/2019       CBC with diff:   Results from last 7 days   Lab Units 07/02/19  0904   WBC Thousand/uL 7 05   HEMOGLOBIN g/dL 12 3   HEMATOCRIT % 36 6   MCV fL 92   PLATELETS Thousands/uL 230   MCH pg 30 9   MCHC g/dL 33 6   RDW % 11 9   MPV fL 10 7   NRBC AUTO /100 WBCs 0       CMP:  Results from last 7 days   Lab Units 07/02/19  0904   POTASSIUM mmol/L 4 0   CHLORIDE mmol/L 106   CO2 mmol/L 28   BUN mg/dL 14   CREATININE mg/dL 0 74   CALCIUM mg/dL 9 0   AST U/L 30   ALT U/L 72   ALK PHOS U/L 70   EGFR ml/min/1 73sq m 90       IMAGING:  No orders to display     Ct Small Bowel Enterography    Result Date: 6/20/2019  Narrative: CT ABDOMEN AND PELVIS WITH IV CONTRAST- ENTEROGRAPHY - WITH CONTRAST INDICATION:   D3A 012: Benign carcinoid tumor of the ileum  COMPARISON:  None  TECHNIQUE:  Contrast-enhanced CT examination of the abdomen and pelvis was performed utilizing thin section technique and after the administration of low density enteric contrast according to protocol designed specifically to obtain sensitive evaluation of the small bowel  Axial, sagittal, and coronal 2D reformatted images were created from the source data and submitted for interpretation  Radiation dose length product (DLP) for this visit:  374 7 mGy-cm   This examination, like all CT scans performed in the [facility], was performed utilizing techniques to minimize radiation dose exposure, including the use of iterative reconstruction and automated exposure control  IV Contrast:  100 mL of iohexol (OMNIPAQUE) Enteric Contrast:  1500 cc of VoLumen enteric contrast was administered  FINDINGS: ABDOMEN SMALL BOWEL: There is an enhancing nodule in the terminal ileum, adjacent to the ileocecal valve, measuring 1 9 cm  Presumably this corresponds with the known neoplasm  There is no small bowel wall thickening or obstruction  LARGE BOWEL:  Normal caliber  No focal wall thickening or pericolonic inflammatory change  There is sigmoid diverticulosis without diverticulitis  STOMACH:  Unremarkable  LOWER CHEST:  No clinically significant abnormality identified in the visualized lower chest  LIVER/BILIARY TREE:  Ill-defined enhancing lesion in the right hepatic lobe posterior segment measuring approximately 2 3 x 1 6 cm on image 2/42    Additional 1 2 cm enhancing focus at the lateral margin of the right hepatic lobe at the same level, on image 2/37  Additional enhancing lesions in the posterior segment more inferiorly, measuring 1 2 x 1 4 cm on image 2/59, and 1 8 x 1 7 cm on image 2/79  GALLBLADDER:  No calcified gallstones  No pericholecystic inflammatory change  SPLEEN:  Unremarkable  PANCREAS:  Unremarkable  ADRENAL GLANDS:  Unremarkable  KIDNEYS/URETERS:  Indeterminate 1 0 cm hypodense nodule at the lower pole of the right kidney posteriorly on image 2/106  No hydronephrosis or calculi  ABDOMINOPELVIC CAVITY: Enhancing nodule with possible necrotic component in the right lower quadrant mesentery, measuring 2 6 x 1 9 cm in short axis, suspicious for metastatic adenopathy  No ascites  No pneumoperitoneum  VESSELS:  Unremarkable for patient's age  PELVIS REPRODUCTIVE ORGANS:  Patient is status post hysterectomy  URINARY BLADDER:  Unremarkable  ABDOMINAL WALL/INGUINAL REGIONS:  Unremarkable  OSSEOUS STRUCTURES:  No acute fracture or destructive osseous lesion  Impression: 1 9 cm nodule in the terminal ileum, presumably corresponding with known neoplasm  There is a 2 6 cm right lower quadrant mesenteric nodule which is suspicious for metastatic disease  Multiple enhancing lesions in the right hepatic lobe, concerning for metastatic disease  Suggest hepatic protocol MRI for further evaluation  Indeterminate 1 0 cm hypodense right renal nodule  Suggest ultrasound for further evaluation  The study was marked in EPIC for significant notification  Workstation performed: [id number]     Nm Pet Ct Skull Base To Mid Thigh    Result Date: 6/24/2019  Narrative: PET/CT SCAN INDICATION: Suspected carcinoid tumor of the terminal ileum  Initial staging    D3A 012: Benign carcinoid tumor of the ileum MODIFIER: PI COMPARISON: CT small bowel enterography 6/18/2019 CELL TYPE:  preliminary pathology of minute group of epithelial cells with neuroendocrine differentiation of uncertain significance (bx terminal ileum polyp 6/7/19) TECHNIQUE:   8 6 mCi F-18-FDG administered IV  Multiplanar attenuation corrected and non attenuation corrected PET images are available for interpretation, and contiguous, low dose, axial CT sections were obtained from the skull base through the femurs   Intravenous contrast material was not utilized  This examination, like all CT scans performed in the [facility], was performed utilizing techniques to minimize radiation dose exposure, including the use of iterative reconstruction and automated exposure control  Fasting serum glucose: 95 mg/dl FINDINGS: VISUALIZED BRAIN:   No acute abnormalities are seen  HEAD/NECK:   There is a physiologic distribution of FDG  Fairly symmetric FDG uptake in Waldeyer's ring is likely physiologic  No FDG avid cervical adenopathy is seen  CT images: Unremarkable  CHEST:   No FDG avid soft tissue lesions are seen  CT images: Unremarkable  ABDOMEN/PELVIS:  Mild focal FDG uptake at the terminal ileum, SUV max of 3 1 where there is a known lesion on prior CT  The activity is similar to normal bowel activity  Scattered vague hypodense lesions in the liver, not FDG avid above liver background, SUV max of 3 1  These are better seen on the prior contrast CT where they were hyperdense and enhancing  Enlarged right mesenteric lymph node again noted, with mild FDG uptake, SUV max of 3 0  This measures 2 6 x 2 0 cm image 179 series 3  Linear FDG uptake at the right hip may be physiologic or related to tendinitis  CT images: Otherwise unremarkable  OSSEOUS STRUCTURES: No FDG avid lesions are seen  CT images: S-shaped scoliosis noted of the thoracolumbar spine  Scattered multilevel degenerative changes of the spine  Impression: 1  Mild FDG uptake in the region of the known terminal ileal lesion and in the right mesenteric lymph node  Findings favor low-grade neoplasm   2  The scattered hepatic lesions do not demonstrate significant FDG uptake above liver background   3   Given the pathology findings of suspected carcinoid disease, gallium-68 Dotatate scan may be a more sensitive test  Workstation performed: [id number]

## 2019-07-05 LAB — CGA SERPL-SCNC: 3 NMOL/L (ref 0–5)

## 2019-07-09 ENCOUNTER — TELEPHONE (OUTPATIENT)
Dept: GASTROENTEROLOGY | Facility: CLINIC | Age: 59
End: 2019-07-09

## 2019-07-09 ENCOUNTER — HOSPITAL ENCOUNTER (OUTPATIENT)
Dept: RADIOLOGY | Age: 59
Discharge: HOME | End: 2019-07-09
Payer: COMMERCIAL

## 2019-07-09 ENCOUNTER — TELEPHONE (OUTPATIENT)
Dept: HEMATOLOGY ONCOLOGY | Facility: CLINIC | Age: 59
End: 2019-07-09

## 2019-07-09 DIAGNOSIS — D3A.012 CARCINOID TUMOR OF ILEUM: ICD-10-CM

## 2019-07-09 PROCEDURE — 78815 PET IMAGE W/CT SKULL-THIGH: CPT

## 2019-07-09 PROCEDURE — A9587 GALLIUM GA-68: HCPCS

## 2019-07-09 NOTE — TELEPHONE ENCOUNTER
Sudha - Patient received a call from Dr Aguero Sessions  Would like to speak with Sudha   Please call [phone]

## 2019-07-09 NOTE — TELEPHONE ENCOUNTER
Patient already spoke with Dr Laura Santos and would like to discuss results with Dr Jeffries Louder    Please call her at [phone]
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
## 2019-07-10 DIAGNOSIS — C7A.8 NEUROENDOCRINE CANCER (HCC): Primary | ICD-10-CM

## 2019-07-10 NOTE — TELEPHONE ENCOUNTER
Sujit Lockwood, can you just ask Dr Vanessa Condon to call her with her PET results so that we can make her surg onc appt? Thank you!

## 2019-07-10 NOTE — TELEPHONE ENCOUNTER
I called pt to schedule the appt w/ surgical oncology, but she is still waiting for Dr Acacia Gonzales office to return her call concerning the PET results  Can someone please call her? Thank you

## 2019-07-10 NOTE — PROGRESS NOTES
Noticed a new result of PET and carcinoid, plan to send pt for surg onc  We plan to start sandostatin in next visit

## 2019-07-11 NOTE — TELEPHONE ENCOUNTER
Called patient back and reviewed that her PET scan shows significant findings in her liver per Dr Monique Banda will want to start her on treatment at the next visit and referral to surgical oncology  Explained he does still want her to get a consult with surgical oncology, and that she has a follow up appointment scheduled with Kt Quiñones PA-C on 7/16  Patient verbalized understanding

## 2019-07-15 NOTE — PROGRESS NOTES
Hematology/Oncology Outpatient Follow- up Note  Jose Maria Smith 63 y o  female MRN: @ Encounter: [id number]      Date:  7/16/2019    Presenting Complaint/Diagnosis :  GI Carcinoid Tumor    HPI:  Eve Ngo is a pleasant 63year old   female, who presents to the office accompanied by her  for today's visit  Jose Maria Smith was seen in initial consultation by Dr Lennox Acuna on 7/2/19  She was referred with persistent diarrhea for 2 years, had colonoscopy with polyps and biopsy illustrated GI carcinoid tumor  Ki 67 <3%, low to intermediate grade  She had CT scan small bowel enterography on 06/18/2019  There was 1 9 cm nodule in the terminal ileum in addition to 2 6 cm right lower quadrant mesenteric nodule which is suspicious for metastasis  There are multiple enhancing lesions in the right hepatic lobe that were concerning for Mets as well  She had a PET scan on 06/24/2019, which illustrated mild FDG uptake in the region with terminal ileal lesion as well as right mesenteric lymph node  Findings favored low-grade neoplasm  There is not significant FDG uptake regarding the scattered hepatic lesions  Recommendation for octreotide scan due to suspected carcinoid disease  Previous Hematologic/ Oncologic History:    Workup    Current Hematologic/ Oncologic Treatment:    Workup    Interval History:    Since initial consultation with Dr Monique Banda on 07/02/2019, patient notes she did have octreotide scan performed on 07/09/2019  There was intense radiotracer activity in the terminal ileum that corresponds with the known tumor  Adjacent metastatic mesenteric node as well  Multiple liver Mets were visualized, however there was no metastasis visualized in the neck or thorax area  She notes that diarrhea is an ongoing problem  She notes she may have a hot flash at the time diarrhea is occurring  She otherwise denies flushing  She had blood work obtained on 07/02/2019    CBC within normal limits, as is CMP  Chromogranin a is within normal limits at 3  Somatostatin is still in process  Patient notes she had hysterectomy for fibroid and notes that she was told that her bladder had dropped and now she is incontinent for urine at times  She notes that has not been any worsening of her diarrhea which still may occur up to 3-4 times after meals on a daily basis  She had been told by GI there may be a component of IBS as well  She notes she has some fatigue and does feel bloated in her abdominal area  Denies chest pain/shortness of breath  No nausea or vomiting  Cancer Staging:  Cancer Staging  Metastatic Carcinoid  Terminal ileum, Mets to adjacent Mesenteric Node and Liver  Ki 67 <3%, low to intermediate grade  Test Results:  Imaging: Ct Small Bowel Enterography  Result Date: 6/20/2019  Narrative: CT ABDOMEN AND PELVIS WITH IV CONTRAST- ENTEROGRAPHY - WITH CONTRAST INDICATION:   D3A 012: Benign carcinoid tumor of the ileum  COMPARISON:  None  TECHNIQUE:  Contrast-enhanced CT examination of the abdomen and pelvis was performed utilizing thin section technique and after the administration of low density enteric contrast according to protocol designed specifically to obtain sensitive evaluation of the small bowel  Axial, sagittal, and coronal 2D reformatted images were created from the source data and submitted for interpretation  Radiation dose length product (DLP) for this visit:  374 7 mGy-cm   This examination, like all CT scans performed in the [facility], was performed utilizing techniques to minimize radiation dose exposure, including the use of iterative reconstruction and automated exposure control  IV Contrast:  100 mL of iohexol (OMNIPAQUE) Enteric Contrast:  1500 cc of VoLumen enteric contrast was administered  FINDINGS: ABDOMEN SMALL BOWEL: There is an enhancing nodule in the terminal ileum, adjacent to the ileocecal valve, measuring 1 9 cm    Presumably this corresponds with the known neoplasm  There is no small bowel wall thickening or obstruction  LARGE BOWEL:  Normal caliber  No focal wall thickening or pericolonic inflammatory change  There is sigmoid diverticulosis without diverticulitis  STOMACH:  Unremarkable  LOWER CHEST:  No clinically significant abnormality identified in the visualized lower chest  LIVER/BILIARY TREE:  Ill-defined enhancing lesion in the right hepatic lobe posterior segment measuring approximately 2 3 x 1 6 cm on image 2/42  Additional 1 2 cm enhancing focus at the lateral margin of the right hepatic lobe at the same level, on image 2/37  Additional enhancing lesions in the posterior segment more inferiorly, measuring 1 2 x 1 4 cm on image 2/59, and 1 8 x 1 7 cm on image 2/79  GALLBLADDER:  No calcified gallstones  No pericholecystic inflammatory change  SPLEEN:  Unremarkable  PANCREAS:  Unremarkable  ADRENAL GLANDS:  Unremarkable  KIDNEYS/URETERS:  Indeterminate 1 0 cm hypodense nodule at the lower pole of the right kidney posteriorly on image 2/106  No hydronephrosis or calculi  ABDOMINOPELVIC CAVITY: Enhancing nodule with possible necrotic component in the right lower quadrant mesentery, measuring 2 6 x 1 9 cm in short axis, suspicious for metastatic adenopathy  No ascites  No pneumoperitoneum  VESSELS:  Unremarkable for patient's age  PELVIS REPRODUCTIVE ORGANS:  Patient is status post hysterectomy  URINARY BLADDER:  Unremarkable  ABDOMINAL WALL/INGUINAL REGIONS:  Unremarkable  OSSEOUS STRUCTURES:  No acute fracture or destructive osseous lesion  Impression: 1 9 cm nodule in the terminal ileum, presumably corresponding with known neoplasm  There is a 2 6 cm right lower quadrant mesenteric nodule which is suspicious for metastatic disease  Multiple enhancing lesions in the right hepatic lobe, concerning for metastatic disease  Suggest hepatic protocol MRI for further evaluation  Indeterminate 1 0 cm hypodense right renal nodule  Suggest ultrasound for further evaluation  The study was marked in EPIC for significant notification  Workstation performed: [id number]     Nm Pet Ct Skull Base To Mid Thigh  Result Date: 7/9/2019  Narrative: NETSPOT PET/CT SCAN  INDICATION:   D3A 012: Benign carcinoid tumor of the ileum  MODIFIER: PI      COMPARISON:  PET CT 6/24/2019  CELL TYPE:  Neuroendocrine tumor, carcinoid, terminal ileum polyp biopsy 6/7/2019  TECHNIQUE:   4 1 mCi Gallium-68 Dotatate Netspot administered IV  Multiplanar attenuation corrected and non-attenuation corrected PET images are available for interpretation  Contiguous, low dose, axial CT sections were obtained from the vertex through  the femurs for anatomic localization  Intravenous contrast was not utilized  FINDINGS:   BRAIN:    Normal pituitary gland uptake is demonstrated  No acute abnormalities are seen  HEAD/NECK:  There is a physiologic distribution of the radiotracer  Normal salivary gland and thyroid uptake is demonstrated  CT images:  Unremarkable  CHEST:   There is a physiologic distribution of the radiotracer  CT images: Stable  ABDOMEN: There is focally intense radiotracer activity in the terminal ileum, SUV 71, corresponding with known neuroendocrine tumor/carcinoid  This measures approximately 2 4 x 1 8 cm  Adjacent enlarged mesenteric node series 3 image 222 also demonstrates intense radiotracer activity, compatible with metastasis  This measures 2 7 x 1 9 cm, SUV 95 2  There are multiple right hepatic lesions with intense radio tracer activity, most compatible with metastases  These correspond with the lesion seen on prior contrast CT  Largest lesion series 3 image 167 measures approximately 2 2 x 2 4 cm, SUV 49 8  CT images: Otherwise stable  PELVIS:  There is a physiologic distribution of the radiotracer  CT images: Stable  OSSEOUS STRUCTURES:   There is a physiologic distribution of the radiotracer  CT images: Scoliosis  Impression: 1  Intense radiotracer activity in the terminal ileum corresponding with known tumor  Adjacent metastatic mesenteric node  2   Multiple liver metastases visualized  3   No metastases visualized in the neck or thorax  Workstation performed: [id number]     Nm Pet Ct Skull Base To Mid Thigh  Result Date: 6/24/2019  Narrative: PET/CT SCAN INDICATION: Suspected carcinoid tumor of the terminal ileum  Initial staging  D3A 012: Benign carcinoid tumor of the ileum MODIFIER: PI COMPARISON: CT small bowel enterography 6/18/2019 CELL TYPE:  preliminary pathology of minute group of epithelial cells with neuroendocrine differentiation of uncertain significance (bx terminal ileum polyp 6/7/19) TECHNIQUE:   8 6 mCi F-18-FDG administered IV  Multiplanar attenuation corrected and non attenuation corrected PET images are available for interpretation, and contiguous, low dose, axial CT sections were obtained from the skull base through the femurs   Intravenous contrast material was not utilized  This examination, like all CT scans performed in the [facility], was performed utilizing techniques to minimize radiation dose exposure, including the use of iterative reconstruction and automated exposure control  Fasting serum glucose: 95 mg/dl FINDINGS: VISUALIZED BRAIN:   No acute abnormalities are seen  HEAD/NECK:   There is a physiologic distribution of FDG  Fairly symmetric FDG uptake in Waldeyer's ring is likely physiologic  No FDG avid cervical adenopathy is seen  CT images: Unremarkable  CHEST:   No FDG avid soft tissue lesions are seen  CT images: Unremarkable  ABDOMEN/PELVIS:  Mild focal FDG uptake at the terminal ileum, SUV max of 3 1 where there is a known lesion on prior CT  The activity is similar to normal bowel activity  Scattered vague hypodense lesions in the liver, not FDG avid above liver background, SUV max of 3 1    These are better seen on the prior contrast CT where they were hyperdense and enhancing  Enlarged right mesenteric lymph node again noted, with mild FDG uptake, SUV max of 3 0  This measures 2 6 x 2 0 cm image 179 series 3  Linear FDG uptake at the right hip may be physiologic or related to tendinitis  CT images: Otherwise unremarkable  OSSEOUS STRUCTURES: No FDG avid lesions are seen  CT images: S-shaped scoliosis noted of the thoracolumbar spine  Scattered multilevel degenerative changes of the spine  Impression: 1  Mild FDG uptake in the region of the known terminal ileal lesion and in the right mesenteric lymph node  Findings favor low-grade neoplasm  2  The scattered hepatic lesions do not demonstrate significant FDG uptake above liver background  3   Given the pathology findings of suspected carcinoid disease, gallium-68 Dotatate scan may be a more sensitive test  Workstation performed: [id number]     Labs:   Lab Results   Component Value Date    WBC 7 05 07/02/2019    HGB 12 3 07/02/2019    HCT 36 6 07/02/2019    MCV 92 07/02/2019     07/02/2019   Stable  Lab Results   Component Value Date    K 4 0 07/02/2019     07/02/2019    CO2 28 07/02/2019    BUN 14 07/02/2019    CREATININE 0 74 07/02/2019    GLUF 87 03/02/2018    CALCIUM 9 0 07/02/2019    AST 30 07/02/2019    ALT 72 07/02/2019    ALKPHOS 70 07/02/2019    EGFR 90 07/02/2019   Na 143, Albumin 3 7  Stable  Ref Range & Units 7/2/19  9:04 AM   Chromogranin A 0 - 5 nmol/L 3    Stable  Somatostatin = in process  Review of Systems   Constitutional: Positive for fatigue (falls asleep early)  Negative for activity change, appetite change, fever and unexpected weight change  HENT: Negative for congestion, nosebleeds, sore throat and trouble swallowing  Eyes: Negative for visual disturbance  Respiratory: Negative for cough, chest tightness, shortness of breath and wheezing  Cardiovascular: Positive for palpitations  Negative for chest pain and leg swelling     Gastrointestinal: Positive for abdominal distention and diarrhea  Negative for abdominal pain, blood in stool, constipation, nausea and vomiting  Endocrine: Positive for heat intolerance (Hot flash accompanying diarrhea  Quick onset  Work brings it on )  Genitourinary: Positive for frequency  Negative for difficulty urinating, dysuria, hematuria, pelvic pain and vaginal bleeding  Incontinence for urine  Uterus removed for Fibroid  Musculoskeletal: Positive for arthralgias (Hips B/L) and back pain (Scoliosis)  Negative for myalgias  Skin: Negative for pallor and rash  Pimple left hip itching   Neurological: Negative for dizziness, weakness, numbness and headaches  Hematological: Negative for adenopathy  Does not bruise/bleed easily (More recently)  Psychiatric/Behavioral: Positive for sleep disturbance (Nocturia  Also snores  )  Negative for confusion and self-injury  The patient is not nervous/anxious  Active Problems:   Patient Active Problem List   Diagnosis    Chronic bilateral low back pain    Colon cancer screening    Depression    Scoliosis of thoracic spine    Right leg pain    Screening for colon cancer    Carcinoid tumor of ileum    Diarrhea     Past Medical History:   Past Medical History:   Diagnosis Date    Psychiatric disorder     Scoliosis     Strep throat     Weight gain      Surgical History:   Past Surgical History:   Procedure Laterality Date    BREAST BIOPSY      BREAST LUMPECTOMY      HYSTERECTOMY      PARTIAL HYSTERECTOMY      TUBAL LIGATION       Family History:    Family History   Problem Relation Age of Onset    Thyroid cancer Mother     Multiple myeloma Father      Cancer-related family history includes Thyroid cancer in her mother      Social History:   Social History     Socioeconomic History    Marital status:      Spouse name: Not on file    Number of children: Not on file    Years of education: Not on file    Highest education level: Not on file Occupational History    Not on file   Social Needs    Financial resource strain: Not on file    Food insecurity:     Worry: Not on file     Inability: Not on file    Transportation needs:     Medical: Not on file     Non-medical: Not on file   Tobacco Use    Smoking status: Never Smoker    Smokeless tobacco: Never Used   Substance and Sexual Activity    Alcohol use: Yes     Alcohol/week: 2 0 standard drinks     Types: 2 Standard drinks or equivalent per week     Frequency: 2-4 times a month     Comment: socially    Drug use: No    Sexual activity: Not on file   Lifestyle    Physical activity:     Days per week: Not on file     Minutes per session: Not on file    Stress: Not on file   Relationships    Social connections:     Talks on phone: Not on file     Gets together: Not on file     Attends [employer] service: Not on file     Active member of club or organization: Not on file     Attends meetings of clubs or organizations: Not on file     Relationship status: Not on file    Intimate partner violence:     Fear of current or ex partner: Not on file     Emotionally abused: Not on file     Physically abused: Not on file     Forced sexual activity: Not on file   Other Topics Concern    Not on file   Social History Narrative      2 sons    2 Grandkids     Current Medications:   Current Outpatient Medications   Medication Sig Dispense Refill    Brexpiprazole (REXULTI) 0 5 MG tablet Take 0 5 mg by mouth daily      citalopram (CeleXA) 20 mg tablet Take 1 tablet (20 mg total) by mouth daily 30 tablet 2    meloxicam (MOBIC) 7 5 mg tablet Take 1 tablet (7 5 mg total) by mouth daily 30 tablet 0     No current facility-administered medications for this visit  Allergies: Allergies   Allergen Reactions    Prednisone Other (See Comments) and Confusion     Psychosis      Physical Exam:  Physical Exam   Constitutional: She is oriented to person, place, and time   She appears well-developed and well-nourished  No distress  HENT:   Head: Normocephalic and atraumatic  Mouth/Throat: Oropharynx is clear and moist  No oropharyngeal exudate  Eyes: Pupils are equal, round, and reactive to light  Conjunctivae and EOM are normal  Right eye exhibits no discharge  Left eye exhibits no discharge  No scleral icterus  Neck: Normal range of motion  Neck supple  No tracheal deviation present  No thyromegaly present  Cardiovascular: Normal rate, regular rhythm and normal heart sounds  Exam reveals no gallop and no friction rub  No murmur heard  Pulmonary/Chest: Effort normal and breath sounds normal  No stridor  No respiratory distress  She has no wheezes  She has no rales  Abdominal: Soft  Bowel sounds are normal  She exhibits no distension and no mass  There is no hepatosplenomegaly  There is no tenderness  There is no rebound and no guarding  Genitourinary:   Genitourinary Comments: Deferred   Musculoskeletal: Normal range of motion  She exhibits no edema or tenderness  Lymphadenopathy:     She has no cervical adenopathy  Right: No inguinal and no supraclavicular adenopathy present  Left: No inguinal and no supraclavicular adenopathy present  Neurological: She is alert and oriented to person, place, and time  No sensory deficit  Skin: Skin is warm and dry  No rash noted  She is not diaphoretic  No erythema  No pallor  Psychiatric: She has a normal mood and affect  Her behavior is normal      Vitals:    07/16/19 0800   BP: 119/79   Pulse: 63   Resp: 16   Temp: 97 9 °F (36 6 °C)   SpO2: 97%   Stable  Assessment / Plan:    1  Carcinoid tumor of ileum  2  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 ([address] )  3  Other long term (current) drug therapy  Metastatic Carcinoid eminating in terminal ileum with adjacent mesenteric node mets as well as liver  Ki 67 <3%, low to intermediate grade    There was prior discussion that if no mets, would refer to Dr Rosalee Peña, and with mets, would go to Sandostatin monthly  Dr Shy Valverde was present for visit and indicated with finding of mets in liver, with multiple spots, we will plan on starting Sandostatin 30 mg IM every month  Consent was obtained, and patient was willing to undertake risks at this time  We provided literature on Sandostatin to patient and reviewed at length  We discussed pain at the injection site with lump is the most frequent symptom  In addition, there is a 5-10% chance of Gall bladder issue  She was made aware with significant RUQ pain to proceed to ED  Dr Shy Valverde did reach out to Dr Lubna Uriostegui to see if Surg Onc would have anything to offer  We will plan on CBC, CMP, Chromogranin A and Somatostatin prior to 2nd cycle  We will arrange for sandostatin to be administered every 28 days and therapy plan was assigned  We will plan on follow up prior to 2nd cycle and will arrange the day of next treatment  She will call with any concerns  - CBC and differential; Standing  - Comprehensive metabolic panel; Standing  - Chromogranin A; Standing  - Somatostatin; Standing  - CBC and differential  - Comprehensive metabolic panel  - Chromogranin A  - Somatostatin    4  Diarrhea, unspecified type  OK to take Imodium antidiarrheal for diarrhea  ECOG PS 0    Goals and Barriers:  Current Goal:  Prolong Survival from Metastatic Carcinoid  Barriers: None  Patient's Capacity to Self Care:  Patient is able to self care  Total time spent with patient was 45+ minutes, of which >50% of the time was spent in direct consultation discussing Imaging results, upcoming plan, obtaining consent and literature for Sandostatin  ADDENDUM:  Subsequent to visit, we heard back from Dr Lubna Uriostegui and he feels that resection of primary tumor is an option, and will further consider whether IR can ablate liver lesions  Order for referral to Surg onc was placed  We will continue parallel path of Sandostatin use    Patient was called and updated on this referral, and to expect a call from surgical oncology to arrange  In addition, somatostatin was back by end of day and WNL at 29       - Ambulatory referral to Surgical Oncology; Future      Portions of the record may have been created with voice recognition software   Occasional wrong word or "sound a like" substitutions may have occurred due to the inherent limitations of voice recognition software   Read the chart carefully and recognize, using context, where substitutions have occurred

## 2019-07-16 ENCOUNTER — OFFICE VISIT (OUTPATIENT)
Dept: HEMATOLOGY ONCOLOGY | Facility: CLINIC | Age: 59
End: 2019-07-16
Payer: COMMERCIAL

## 2019-07-16 VITALS
OXYGEN SATURATION: 97 % | HEART RATE: 63 BPM | TEMPERATURE: 97.9 F | BODY MASS INDEX: 25.71 KG/M2 | SYSTOLIC BLOOD PRESSURE: 119 MMHG | WEIGHT: 160 LBS | RESPIRATION RATE: 16 BRPM | HEIGHT: 66 IN | DIASTOLIC BLOOD PRESSURE: 79 MMHG

## 2019-07-16 DIAGNOSIS — C7A.8 NEUROENDOCRINE CANCER (HCC): ICD-10-CM

## 2019-07-16 DIAGNOSIS — D3A.012 CARCINOID TUMOR OF ILEUM: Primary | ICD-10-CM

## 2019-07-16 DIAGNOSIS — R19.7 DIARRHEA, UNSPECIFIED TYPE: ICD-10-CM

## 2019-07-16 DIAGNOSIS — C7B.02 METASTATIC MALIGNANT CARCINOID TUMOR TO LIVER (HCC): ICD-10-CM

## 2019-07-16 DIAGNOSIS — Z79.899 OTHER LONG TERM (CURRENT) DRUG THERAPY: ICD-10-CM

## 2019-07-16 LAB — SCAN RESULT: NORMAL

## 2019-07-16 PROCEDURE — 99215 OFFICE O/P EST HI 40 MIN: CPT | Performed by: PHYSICIAN ASSISTANT

## 2019-07-23 ENCOUNTER — HOSPITAL ENCOUNTER (OUTPATIENT)
Dept: INFUSION CENTER | Facility: CLINIC | Age: 59
Discharge: HOME | End: 2019-07-23
Payer: COMMERCIAL

## 2019-07-23 VITALS
RESPIRATION RATE: 18 BRPM | DIASTOLIC BLOOD PRESSURE: 68 MMHG | HEART RATE: 59 BPM | TEMPERATURE: 98.1 F | SYSTOLIC BLOOD PRESSURE: 120 MMHG

## 2019-07-23 DIAGNOSIS — D3A.012 CARCINOID TUMOR OF ILEUM: Primary | ICD-10-CM

## 2019-07-23 PROCEDURE — 96372 THER/PROPH/DIAG INJ SC/IM: CPT

## 2019-07-23 RX ADMIN — OCTREOTIDE ACETATE 30 MG: KIT at 14:36

## 2019-07-23 NOTE — PROGRESS NOTES
Pt here today for first dose of Sandostatin, denies any discomforts  Reviewed adverse side effects with patient  Also instructed patient to call physicians office if any problems with the injection at home  Injection tolerated well    Pt aware of next appointment, Printed AVS

## 2019-08-01 ENCOUNTER — CONSULT (OUTPATIENT)
Dept: SURGICAL ONCOLOGY | Facility: CLINIC | Age: 59
End: 2019-08-01
Payer: COMMERCIAL

## 2019-08-01 ENCOUNTER — OFFICE VISIT (OUTPATIENT)
Dept: LAB | Facility: HOSPITAL | Age: 59
End: 2019-08-01
Attending: SURGERY
Payer: COMMERCIAL

## 2019-08-01 ENCOUNTER — TRANSCRIBE ORDERS (OUTPATIENT)
Dept: ADMINISTRATIVE | Facility: HOSPITAL | Age: 59
End: 2019-08-01

## 2019-08-01 ENCOUNTER — APPOINTMENT (OUTPATIENT)
Dept: LAB | Facility: HOSPITAL | Age: 59
End: 2019-08-01
Attending: SURGERY
Payer: COMMERCIAL

## 2019-08-01 VITALS
DIASTOLIC BLOOD PRESSURE: 68 MMHG | HEIGHT: 66 IN | HEART RATE: 62 BPM | BODY MASS INDEX: 26.2 KG/M2 | RESPIRATION RATE: 18 BRPM | TEMPERATURE: 98.8 F | WEIGHT: 163 LBS | SYSTOLIC BLOOD PRESSURE: 92 MMHG

## 2019-08-01 DIAGNOSIS — D3A.012 CARCINOID TUMOR OF ILEUM: ICD-10-CM

## 2019-08-01 DIAGNOSIS — C7B.02 METASTATIC MALIGNANT CARCINOID TUMOR TO LIVER (HCC): ICD-10-CM

## 2019-08-01 DIAGNOSIS — C7B.02 SECONDARY MALIGNANT CARCINOID TUMOR OF LIVER (HCC): Primary | ICD-10-CM

## 2019-08-01 DIAGNOSIS — C7B.02 SECONDARY MALIGNANT CARCINOID TUMOR OF LIVER (HCC): ICD-10-CM

## 2019-08-01 DIAGNOSIS — D3A.012 CARCINOID TUMOR OF ILEUM: Primary | ICD-10-CM

## 2019-08-01 LAB
ABO GROUP BLD: NORMAL
APTT PPP: 37 SECONDS (ref 23–37)
ATRIAL RATE: 59 BPM
BLD GP AB SCN SERPL QL: NEGATIVE
EST. AVERAGE GLUCOSE BLD GHB EST-MCNC: 114 MG/DL
HBA1C MFR BLD: 5.6 % (ref 4.2–6.3)
INR PPP: 0.96 (ref 0.84–1.19)
P AXIS: 49 DEGREES
PR INTERVAL: 140 MS
PROTHROMBIN TIME: 12.8 SECONDS (ref 11.6–14.5)
QRS AXIS: -26 DEGREES
QRSD INTERVAL: 88 MS
QT INTERVAL: 474 MS
QTC INTERVAL: 469 MS
RH BLD: POSITIVE
SPECIMEN EXPIRATION DATE: NORMAL
T WAVE AXIS: 24 DEGREES
VENTRICULAR RATE: 59 BPM

## 2019-08-01 PROCEDURE — 36415 COLL VENOUS BLD VENIPUNCTURE: CPT

## 2019-08-01 PROCEDURE — 86900 BLOOD TYPING SEROLOGIC ABO: CPT

## 2019-08-01 PROCEDURE — 85610 PROTHROMBIN TIME: CPT

## 2019-08-01 PROCEDURE — 93010 ELECTROCARDIOGRAM REPORT: CPT | Performed by: INTERNAL MEDICINE

## 2019-08-01 PROCEDURE — 93005 ELECTROCARDIOGRAM TRACING: CPT

## 2019-08-01 PROCEDURE — 85730 THROMBOPLASTIN TIME PARTIAL: CPT

## 2019-08-01 PROCEDURE — 86850 RBC ANTIBODY SCREEN: CPT

## 2019-08-01 PROCEDURE — 83036 HEMOGLOBIN GLYCOSYLATED A1C: CPT

## 2019-08-01 PROCEDURE — 86901 BLOOD TYPING SEROLOGIC RH(D): CPT

## 2019-08-01 PROCEDURE — 99245 OFF/OP CONSLTJ NEW/EST HI 55: CPT | Performed by: SURGERY

## 2019-08-01 NOTE — PROGRESS NOTES
Surgical Oncology Consult       [address]  [facility]  [facility] [place]  [address]  [facility] [place] [address]    Jose Maria Smith  1960  [id number]  [address]  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)  -     APTT; Future  -     Protime-INR; Future  -     HEMOGLOBIN A1C W/ EAG ESTIMATION; Future  -     Type and screen; Future  -     EKG 12 lead; Future    Other orders  -     Incentive spirometry; Standing  -     Insert and maintain IV line; Standing  -     Void On-Call to O R ; Standing  -     Place sequential compression device; Standing  -     Nursing communcation Please give pre-op Carbohydrate drink to patient 2-4 hours prior to surgery (Drink is provided by [address] [place] [address]); Standing  -     ceFAZolin (ANCEF) 1,000 mg in dextrose 5 % 100 mL IVPB  -     metroNIDAZOLE (FLAGYL) IVPB (premix) 500 mg        Chief Complaint   Patient presents with    Consult     Pt here for consult for metastatic NET  She reports persistent diarrhea, but denies any other symptoms  No follow-ups on file  Carcinoid tumor of ileum    6/7/2019 Initial Diagnosis     Carcinoid tumor of ileum         History of Present Illness:  80-year-old female who initially presented with diarrhea  She underwent a colonoscopy on June 7, 2019  On intubation of the small bowel, a 3 centimeter sessile polyp was removed  Pathology revealed a neuroendocrine tumor    CT enterography revealed an enhancing nodule in the terminal ileum adjacent to the ileocecal valve that measured 1 9 centimeters  There was a at 2 3 x 1 6 centimeter enhancing lesion in the right hepatic lobe  There was also a 1 2 centimeter focus on the lateral margin of the right hepatic lobe  Several other lesions were seen in the right lobe measuring 1 2 x 1 4 centimeters and 1 8 x 1 7 centimeters  An enhancing nodule in the right lower quadrant mesentery measuring 2 6 x 1 9 centimeters was identified and this was suspicious for metastatic adenopathy  Gallium 68 PET scan on July 9, 2019 revealed intense activity in the terminal ileum with an enlarged mesenteric nodes  There were multiple right hepatic liver lesions consistent with metastasis  No other disease was seen  I personally reviewed the films  She comes in now to discuss further therapy  She has been placed on octreotide  She is no longer having diarrhea  No abdominal pain, nausea or vomiting  No unintentional weight loss  She does have flushing if she drinks wine  She also has had some palpitations that she attributes to menopause  No significant headaches  Review of Systems  Complete ROS Surg Onc:   Constitutional: The patient denies new or recent history of general fatigue, no recent weight loss, no change in appetite  Eyes: No complaints of visual problems, no scleral icterus  ENT: no complaints of ear pain, no hoarseness, no difficulty swallowing,  no tinnitus and no new masses in head, oral cavity, or neck  Cardiovascular: No complaints of chest pain, no palpitations, no ankle edema  Respiratory: No complaints of shortness of breath, no cough  Gastrointestinal: No complaints of jaundice, no bloody stools, no pale stools  Genitourinary: No complaints of dysuria, no hematuria, no nocturia, no frequent urination, no urethral discharge  Musculoskeletal: No complaints of weakness, paralysis, joint stiffness or arthralgias    Integumentary: No complaints of rash, no new lesions  Neurological: No complaints of convulsions, no seizures, no dizziness  Hematologic/Lymphatic: No complaints of easy bruising  Endocrine:  No hot or cold intolerance  No polydipsia, polyphagia, or polyuria  Allergy/immunology:  No environmental allergies  No food allergies  Not immunocompromised  Skin:  No pallor or rash  No wound  Patient Active Problem List   Diagnosis    Chronic bilateral low back pain    Colon cancer screening    Depression    Scoliosis of thoracic spine    Right leg pain    Screening for colon cancer    Carcinoid tumor of ileum    Diarrhea    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 ([address] )    Other long term (current) drug therapy     Past Medical History:   Diagnosis Date    Psychiatric disorder     Scoliosis     Strep throat     Weight gain      Past Surgical History:   Procedure Laterality Date    BREAST BIOPSY      BREAST LUMPECTOMY      benign    HYSTERECTOMY      PARTIAL HYSTERECTOMY      TUBAL LIGATION       Family History   Problem Relation Age of Onset    Thyroid cancer Mother 76    Multiple myeloma Father 68     Social History     Socioeconomic History    Marital status:      Spouse name: Not on file    Number of children: Not on file    Years of education: Not on file    Highest education level: Not on file   Occupational History    Not on file   Social Needs    Financial resource strain: Not on file    Food insecurity:     Worry: Not on file     Inability: Not on file    Transportation needs:     Medical: Not on file     Non-medical: Not on file   Tobacco Use    Smoking status: Never Smoker    Smokeless tobacco: Never Used   Substance and Sexual Activity    Alcohol use:  Yes     Alcohol/week: 2 0 standard drinks     Types: 2 Standard drinks or equivalent per week     Frequency: 2-4 times a month     Comment: socially    Drug use: No    Sexual activity: Not on file   Lifestyle    Physical activity:     Days per week: Not on file     Minutes per session: Not on file    Stress: Not on file   Relationships    Social connections:     Talks on phone: Not on file     Gets together: Not on file     Attends Mandaen service: Not on file     Active member of club or organization: Not on file     Attends meetings of clubs or organizations: Not on file     Relationship status: Not on file    Intimate partner violence:     Fear of current or ex partner: Not on file     Emotionally abused: Not on file     Physically abused: Not on file     Forced sexual activity: Not on file   Other Topics Concern    Not on file   Social History Narrative      2 sons    2 Grandkids       Current Outpatient Medications:     Brexpiprazole (REXULTI) 0 5 MG tablet, Take 0 5 mg by mouth daily, Disp: , Rfl:     citalopram (CeleXA) 20 mg tablet, Take 1 tablet (20 mg total) by mouth daily, Disp: 30 tablet, Rfl: 2    meloxicam (MOBIC) 7 5 mg tablet, Take 1 tablet (7 5 mg total) by mouth daily (Patient not taking: Reported on 7/23/2019), Disp: 30 tablet, Rfl: 0  Allergies   Allergen Reactions    Prednisone Other (See Comments) and Confusion     Psychosis      Vitals:    08/01/19 1451   BP: 92/68   Pulse: 62   Resp: 18   Temp: 98 8 °F (37 1 °C)       Physical Exam   Constitutional: General appearance: The Patient is well-developed and well-nourished who appears the stated age in no acute distress  Patient is pleasant and talkative  HEENT:  Normocephalic  Sclerae are anicteric  Mucous membranes are moist  Neck is supple without adenopathy  No JVD  Chest: The lungs are clear to auscultation  Cardiac: Heart is regular rate  Abdomen: Abdomen is soft, non-tender, non-distended and without masses  Extremities: There is no clubbing or cyanosis  There is no edema  Symmetric  Neuro: Grossly nonfocal  Gait is normal      Lymphatic: No evidence of cervical adenopathy bilaterally     No evidence of axillary adenopathy bilaterally  No evidence of inguinal adenopathy bilaterally  Skin: Warm, anicteric  Psych:  Patient is pleasant and talkative  Breasts:      Pathology:  Final Diagnosis   A  Terminal ileum, polyp, endoscopic biopsy:  - Neuroendocrine tumor (carcinoid) involving small intestinal lamina propria and muscularis    mucosae  See note      B  Colon, random endoscopic biopsy:  - Colonic mucosa with no pathologic alteration    - No evidence of lymphocytic or collagenous colitis      Note: The H & E and immunohistochemistry stained slides were sent to Dr Kerri Salinas, Department of pathology at [place] [address], for her expert opinion and review, and the above reflects her diagnosis  Her letter with diagnosis reads as below      Dr Danny Raya was notified of preliminary findings via phone on 6/11/2019 at 3:10 pm       Electronically signed by Michelle Zimmer MD on 6/25/2019 at  2:24 PM          Labs:  Chromogranin A 0 - 5 nmol/L 3          Imaging  Nm Pet Ct Skull Base To Mid Thigh    Result Date: 7/9/2019  Narrative: NETSPOT PET/CT SCAN  INDICATION:   D3A 012: Benign carcinoid tumor of the ileum  MODIFIER: PI      COMPARISON:  PET CT 6/24/2019  CELL TYPE:  Neuroendocrine tumor, carcinoid, terminal ileum polyp biopsy 6/7/2019  TECHNIQUE:   4 1 mCi Gallium-68 Dotatate Netspot administered IV  Multiplanar attenuation corrected and non-attenuation corrected PET images are available for interpretation  Contiguous, low dose, axial CT sections were obtained from the vertex through  the femurs for anatomic localization  Intravenous contrast was not utilized  FINDINGS:   BRAIN:    Normal pituitary gland uptake is demonstrated  No acute abnormalities are seen  HEAD/NECK:  There is a physiologic distribution of the radiotracer  Normal salivary gland and thyroid uptake is demonstrated  CT images:  Unremarkable  CHEST:   There is a physiologic distribution of the radiotracer  CT images: Stable    ABDOMEN: There is focally intense radiotracer activity in the terminal ileum, SUV 71, corresponding with known neuroendocrine tumor/carcinoid  This measures approximately 2 4 x 1 8 cm  Adjacent enlarged mesenteric node series 3 image 222 also demonstrates intense radiotracer activity, compatible with metastasis  This measures 2 7 x 1 9 cm, SUV 95 2  There are multiple right hepatic lesions with intense radio tracer activity, most compatible with metastases  These correspond with the lesion seen on prior contrast CT  Largest lesion series 3 image 167 measures approximately 2 2 x 2 4 cm, SUV 49 8  CT images: Otherwise stable  PELVIS:  There is a physiologic distribution of the radiotracer  CT images: Stable  OSSEOUS STRUCTURES:   There is a physiologic distribution of the radiotracer  CT images: Scoliosis  Impression: 1  Intense radiotracer activity in the terminal ileum corresponding with known tumor  Adjacent metastatic mesenteric node  2   Multiple liver metastases visualized  3   No metastases visualized in the neck or thorax  Workstation performed: [id number]     I reviewed the above laboratory and imaging data  Discussion/Summary:  80-year-old female with metastatic carcinoid originating in the terminal ileum  She has known metastasis to the liver  We had a long discussion regarding treatment options these included resection of the small bowel with continued octreotide therapy, liver directed therapy as well as aggressive surgical treatment including small-bowel resection with possible hemicolectomy, intraoperative ultrasound of the liver, cholecystectomy and ablation of the liver Mets  She is not sure how aggressive she wishes to be, but she does wish to have the primary removed  I did recommend this as there is dated to suggest that removing the primary to will improve her long-term survival   After some discussion she has agreed to small-bowel resection possible hemicolectomy    At the time of surgery we will perform an intraoperative ultrasound of the liver in if at that time it appears that ablation of several of these lesions would confer a survival benefit we will proceed with cholecystectomy and ablation of the liver lesions  Risks and benefits were explained including bleeding, infection, need for further surgery, wound complications, adjacent organ injury and anastomotic leak  Informed consent was obtained  We will schedule this at our earliest mutual convenience  She and her  are agreeable to this  All their questions were answered

## 2019-08-01 NOTE — H&P (VIEW-ONLY)
Surgical Oncology Consult       [address]  [facility]  [facility]  [address]  [facility] [address]    Tatiana Rougon  1960  [id number]  [address]  [facility]  [facility] [place]  [address] [address]    Diagnoses and all orders for this visit:    Carcinoid tumor of ileum  -     Case request operating room: RESECTION SMALL BOWEL; Standing  -     APTT; Future  -     Protime-INR; Future  -     HEMOGLOBIN A1C W/ EAG ESTIMATION; Future  -     Type and screen; Future  -     EKG 12 lead; Future  -     Case request operating room: RESECTION SMALL BOWEL    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)  -     APTT; Future  -     Protime-INR; Future  -     HEMOGLOBIN A1C W/ EAG ESTIMATION; Future  -     Type and screen; Future  -     EKG 12 lead; Future    Other orders  -     Incentive spirometry; Standing  -     Insert and maintain IV line; Standing  -     Void On-Call to O R ; Standing  -     Place sequential compression device; Standing  -     Nursing communcation Please give pre-op Carbohydrate drink to patient 2-4 hours prior to surgery (Drink is provided by [address] [place] [address]); Standing  -     ceFAZolin (ANCEF) 1,000 mg in dextrose 5 % 100 mL IVPB  -     metroNIDAZOLE (FLAGYL) IVPB (premix) 500 mg        Chief Complaint   Patient presents with    Consult     Pt here for consult for metastatic NET  She reports persistent diarrhea, but denies any other symptoms  No follow-ups on file  Carcinoid tumor of ileum    6/7/2019 Initial Diagnosis     Carcinoid tumor of ileum         History of Present Illness:  28-year-old female who initially presented with diarrhea  She underwent a colonoscopy on June 7, 2019  On intubation of the small bowel, a 3 centimeter sessile polyp was removed  Pathology revealed a neuroendocrine tumor    CT enterography revealed an enhancing nodule in the terminal ileum adjacent to the ileocecal valve that measured 1 9 centimeters  There was a at 2 3 x 1 6 centimeter enhancing lesion in the right hepatic lobe  There was also a 1 2 centimeter focus on the lateral margin of the right hepatic lobe  Several other lesions were seen in the right lobe measuring 1 2 x 1 4 centimeters and 1 8 x 1 7 centimeters  An enhancing nodule in the right lower quadrant mesentery measuring 2 6 x 1 9 centimeters was identified and this was suspicious for metastatic adenopathy  Gallium 68 PET scan on July 9, 2019 revealed intense activity in the terminal ileum with an enlarged mesenteric nodes  There were multiple right hepatic liver lesions consistent with metastasis  No other disease was seen  I personally reviewed the films  She comes in now to discuss further therapy  She has been placed on octreotide  She is no longer having diarrhea  No abdominal pain, nausea or vomiting  No unintentional weight loss  She does have flushing if she drinks wine  She also has had some palpitations that she attributes to menopause  No significant headaches  Review of Systems  Complete ROS Surg Onc:   Constitutional: The patient denies new or recent history of general fatigue, no recent weight loss, no change in appetite  Eyes: No complaints of visual problems, no scleral icterus  ENT: no complaints of ear pain, no hoarseness, no difficulty swallowing,  no tinnitus and no new masses in head, oral cavity, or neck  Cardiovascular: No complaints of chest pain, no palpitations, no ankle edema  Respiratory: No complaints of shortness of breath, no cough  Gastrointestinal: No complaints of jaundice, no bloody stools, no pale stools  Genitourinary: No complaints of dysuria, no hematuria, no nocturia, no frequent urination, no urethral discharge  Musculoskeletal: No complaints of weakness, paralysis, joint stiffness or arthralgias    Integumentary: No complaints of rash, no new lesions  Neurological: No complaints of convulsions, no seizures, no dizziness  Hematologic/Lymphatic: No complaints of easy bruising  Endocrine:  No hot or cold intolerance  No polydipsia, polyphagia, or polyuria  Allergy/immunology:  No environmental allergies  No food allergies  Not immunocompromised  Skin:  No pallor or rash  No wound  Patient Active Problem List   Diagnosis    Chronic bilateral low back pain    Colon cancer screening    Depression    Scoliosis of thoracic spine    Right leg pain    Screening for colon cancer    Carcinoid tumor of ileum    Diarrhea    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 ([address] )    Other long term (current) drug therapy     Past Medical History:   Diagnosis Date    Psychiatric disorder     Scoliosis     Strep throat     Weight gain      Past Surgical History:   Procedure Laterality Date    BREAST BIOPSY      BREAST LUMPECTOMY      benign    HYSTERECTOMY      PARTIAL HYSTERECTOMY      TUBAL LIGATION       Family History   Problem Relation Age of Onset    Thyroid cancer Mother 76    Multiple myeloma Father 68     Social History     Socioeconomic History    Marital status:      Spouse name: Not on file    Number of children: Not on file    Years of education: Not on file    Highest education level: Not on file   Occupational History    Not on file   Social Needs    Financial resource strain: Not on file    Food insecurity:     Worry: Not on file     Inability: Not on file    Transportation needs:     Medical: Not on file     Non-medical: Not on file   Tobacco Use    Smoking status: Never Smoker    Smokeless tobacco: Never Used   Substance and Sexual Activity    Alcohol use:  Yes     Alcohol/week: 2 0 standard drinks     Types: 2 Standard drinks or equivalent per week     Frequency: 2-4 times a month     Comment: socially    Drug use: No    Sexual activity: Not on file   Lifestyle    Physical activity:     Days per week: Not on file     Minutes per session: Not on file    Stress: Not on file   Relationships    Social connections:     Talks on phone: Not on file     Gets together: Not on file     Attends Mandaeism service: Not on file     Active member of club or organization: Not on file     Attends meetings of clubs or organizations: Not on file     Relationship status: Not on file    Intimate partner violence:     Fear of current or ex partner: Not on file     Emotionally abused: Not on file     Physically abused: Not on file     Forced sexual activity: Not on file   Other Topics Concern    Not on file   Social History Narrative      2 sons    2 Grandkids       Current Outpatient Medications:     Brexpiprazole (REXULTI) 0 5 MG tablet, Take 0 5 mg by mouth daily, Disp: , Rfl:     citalopram (CeleXA) 20 mg tablet, Take 1 tablet (20 mg total) by mouth daily, Disp: 30 tablet, Rfl: 2    meloxicam (MOBIC) 7 5 mg tablet, Take 1 tablet (7 5 mg total) by mouth daily (Patient not taking: Reported on 7/23/2019), Disp: 30 tablet, Rfl: 0  Allergies   Allergen Reactions    Prednisone Other (See Comments) and Confusion     Psychosis      Vitals:    08/01/19 1451   BP: 92/68   Pulse: 62   Resp: 18   Temp: 98 8 °F (37 1 °C)       Physical Exam   Constitutional: General appearance: The Patient is well-developed and well-nourished who appears the stated age in no acute distress  Patient is pleasant and talkative  HEENT:  Normocephalic  Sclerae are anicteric  Mucous membranes are moist  Neck is supple without adenopathy  No JVD  Chest: The lungs are clear to auscultation  Cardiac: Heart is regular rate  Abdomen: Abdomen is soft, non-tender, non-distended and without masses  Extremities: There is no clubbing or cyanosis  There is no edema  Symmetric  Neuro: Grossly nonfocal  Gait is normal      Lymphatic: No evidence of cervical adenopathy bilaterally     No evidence of axillary adenopathy bilaterally  No evidence of inguinal adenopathy bilaterally  Skin: Warm, anicteric  Psych:  Patient is pleasant and talkative  Breasts:      Pathology:  Final Diagnosis   A  Terminal ileum, polyp, endoscopic biopsy:  - Neuroendocrine tumor (carcinoid) involving small intestinal lamina propria and muscularis    mucosae  See note      B  Colon, random endoscopic biopsy:  - Colonic mucosa with no pathologic alteration    - No evidence of lymphocytic or collagenous colitis      Note: The H & E and immunohistochemistry stained slides were sent to Dr Eran Chan, Department of pathology at [place] [address], for her expert opinion and review, and the above reflects her diagnosis  Her letter with diagnosis reads as below      Dr Marcia Sanchez was notified of preliminary findings via phone on 6/11/2019 at 3:10 pm       Electronically signed by Morena Burnett MD on 6/25/2019 at  2:24 PM          Labs:  Chromogranin A 0 - 5 nmol/L 3          Imaging  Nm Pet Ct Skull Base To Mid Thigh    Result Date: 7/9/2019  Narrative: NETSPOT PET/CT SCAN  INDICATION:   D3A 012: Benign carcinoid tumor of the ileum  MODIFIER: PI      COMPARISON:  PET CT 6/24/2019  CELL TYPE:  Neuroendocrine tumor, carcinoid, terminal ileum polyp biopsy 6/7/2019  TECHNIQUE:   4 1 mCi Gallium-68 Dotatate Netspot administered IV  Multiplanar attenuation corrected and non-attenuation corrected PET images are available for interpretation  Contiguous, low dose, axial CT sections were obtained from the vertex through  the femurs for anatomic localization  Intravenous contrast was not utilized  FINDINGS:   BRAIN:    Normal pituitary gland uptake is demonstrated  No acute abnormalities are seen  HEAD/NECK:  There is a physiologic distribution of the radiotracer  Normal salivary gland and thyroid uptake is demonstrated  CT images:  Unremarkable  CHEST:   There is a physiologic distribution of the radiotracer  CT images: Stable    ABDOMEN: There is focally intense radiotracer activity in the terminal ileum, SUV 71, corresponding with known neuroendocrine tumor/carcinoid  This measures approximately 2 4 x 1 8 cm  Adjacent enlarged mesenteric node series 3 image 222 also demonstrates intense radiotracer activity, compatible with metastasis  This measures 2 7 x 1 9 cm, SUV 95 2  There are multiple right hepatic lesions with intense radio tracer activity, most compatible with metastases  These correspond with the lesion seen on prior contrast CT  Largest lesion series 3 image 167 measures approximately 2 2 x 2 4 cm, SUV 49 8  CT images: Otherwise stable  PELVIS:  There is a physiologic distribution of the radiotracer  CT images: Stable  OSSEOUS STRUCTURES:   There is a physiologic distribution of the radiotracer  CT images: Scoliosis  Impression: 1  Intense radiotracer activity in the terminal ileum corresponding with known tumor  Adjacent metastatic mesenteric node  2   Multiple liver metastases visualized  3   No metastases visualized in the neck or thorax  Workstation performed: [id number]     I reviewed the above laboratory and imaging data  Discussion/Summary:  50-year-old female with metastatic carcinoid originating in the terminal ileum  She has known metastasis to the liver  We had a long discussion regarding treatment options these included resection of the small bowel with continued octreotide therapy, liver directed therapy as well as aggressive surgical treatment including small-bowel resection with possible hemicolectomy, intraoperative ultrasound of the liver, cholecystectomy and ablation of the liver Mets  She is not sure how aggressive she wishes to be, but she does wish to have the primary removed  I did recommend this as there is dated to suggest that removing the primary to will improve her long-term survival   After some discussion she has agreed to small-bowel resection possible hemicolectomy    At the time of surgery we will perform an intraoperative ultrasound of the liver in if at that time it appears that ablation of several of these lesions would confer a survival benefit we will proceed with cholecystectomy and ablation of the liver lesions  Risks and benefits were explained including bleeding, infection, need for further surgery, wound complications, adjacent organ injury and anastomotic leak  Informed consent was obtained  We will schedule this at our earliest mutual convenience  She and her  are agreeable to this  All their questions were answered

## 2019-08-01 NOTE — LETTER
August 1, 2019     Chapis Chau MD  [address]  1000 [facility]  [address]    Patient: Shravan Serrato   YOB: 1960   Date of Visit: 8/1/2019       Dear Dr Adilia Ward: Thank you for referring Taya Alfaro to me for evaluation  Below are my notes for this consultation  If you have questions, please do not hesitate to call me  I look forward to following your patient along with you  Sincerely,        Ronaldo Resendiz MD        CC: Gladis Díaz MD PhD  MD Ronaldo Schmitt MD  8/1/2019  3:42 PM  Sign at close encounter               Eron Gomez [address]  605 [facility] [address]  1960  [id number]  [facility]  [facility]ER [facility] [address] [address]    Diagnoses and all orders for this visit:    Carcinoid tumor of ileum  -     Case request operating room: RESECTION SMALL BOWEL; Standing  -     APTT; Future  -     Protime-INR; Future  -     HEMOGLOBIN A1C W/ EAG ESTIMATION; Future  -     Type and screen; Future  -     EKG 12 lead; Future  -     Case request operating room: RESECTION SMALL BOWEL    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)  -     APTT; Future  -     Protime-INR; Future  -     HEMOGLOBIN A1C W/ EAG ESTIMATION; Future  -     Type and screen; Future  -     EKG 12 lead; Future    Other orders  -     Incentive spirometry; Standing  -     Insert and maintain IV line; Standing  -     Void On-Call to O R ; Standing  -     Place sequential compression device; Standing  -     Nursing communcation Please give pre-op Carbohydrate drink to patient 2-4 hours prior to surgery (Drink is provided by [address] [place] [address]);  Standing  -     ceFAZolin (ANCEF) 1,000 mg in dextrose 5 % 100 mL IVPB  -     metroNIDAZOLE (FLAGYL) IVPB (premix) 500 mg        Chief Complaint Patient presents with    Consult     Pt here for consult for metastatic NET  She reports persistent diarrhea, but denies any other symptoms  No follow-ups on file  Carcinoid tumor of ileum    6/7/2019 Initial Diagnosis     Carcinoid tumor of ileum         History of Present Illness:  36-year-old female who initially presented with diarrhea  She underwent a colonoscopy on June 7, 2019  On intubation of the small bowel, a 3 centimeter sessile polyp was removed  Pathology revealed a neuroendocrine tumor  CT enterography revealed an enhancing nodule in the terminal ileum adjacent to the ileocecal valve that measured 1 9 centimeters  There was a at 2 3 x 1 6 centimeter enhancing lesion in the right hepatic lobe  There was also a 1 2 centimeter focus on the lateral margin of the right hepatic lobe  Several other lesions were seen in the right lobe measuring 1 2 x 1 4 centimeters and 1 8 x 1 7 centimeters  An enhancing nodule in the right lower quadrant mesentery measuring 2 6 x 1 9 centimeters was identified and this was suspicious for metastatic adenopathy  Gallium 68 PET scan on July 9, 2019 revealed intense activity in the terminal ileum with an enlarged mesenteric nodes  There were multiple right hepatic liver lesions consistent with metastasis  No other disease was seen  I personally reviewed the films  She comes in now to discuss further therapy  She has been placed on octreotide  She is no longer having diarrhea  No abdominal pain, nausea or vomiting  No unintentional weight loss  She does have flushing if she drinks wine  She also has had some palpitations that she attributes to menopause  No significant headaches  Review of Systems  Complete ROS Surg Onc:   Constitutional: The patient denies new or recent history of general fatigue, no recent weight loss, no change in appetite  Eyes: No complaints of visual problems, no scleral icterus     ENT: no complaints of ear pain, no hoarseness, no difficulty swallowing,  no tinnitus and no new masses in head, oral cavity, or neck  Cardiovascular: No complaints of chest pain, no palpitations, no ankle edema  Respiratory: No complaints of shortness of breath, no cough  Gastrointestinal: No complaints of jaundice, no bloody stools, no pale stools  Genitourinary: No complaints of dysuria, no hematuria, no nocturia, no frequent urination, no urethral discharge  Musculoskeletal: No complaints of weakness, paralysis, joint stiffness or arthralgias  Integumentary: No complaints of rash, no new lesions  Neurological: No complaints of convulsions, no seizures, no dizziness  Hematologic/Lymphatic: No complaints of easy bruising  Endocrine:  No hot or cold intolerance  No polydipsia, polyphagia, or polyuria  Allergy/immunology:  No environmental allergies  No food allergies  Not immunocompromised  Skin:  No pallor or rash  No wound            Patient Active Problem List   Diagnosis    Chronic bilateral low back pain    Colon cancer screening    Depression    Scoliosis of thoracic spine    Right leg pain    Screening for colon cancer    Carcinoid tumor of ileum    Diarrhea    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 ([facility] [address] )    Other long term (current) drug therapy     Past Medical History:   Diagnosis Date    Psychiatric disorder     Scoliosis     Strep throat     Weight gain      Past Surgical History:   Procedure Laterality Date    BREAST BIOPSY      BREAST LUMPECTOMY      benign    HYSTERECTOMY      PARTIAL HYSTERECTOMY      TUBAL LIGATION       Family History   Problem Relation Age of Onset    Thyroid cancer Mother 76    Multiple myeloma Father 68     Social History     Socioeconomic History    Marital status:      Spouse name: Not on file    Number of children: Not on file    Years of education: Not on file    Highest education level: Not on file   Occupational History    Not on file   Social Needs    Financial resource strain: Not on file    Food insecurity:     Worry: Not on file     Inability: Not on file    Transportation needs:     Medical: Not on file     Non-medical: Not on file   Tobacco Use    Smoking status: Never Smoker    Smokeless tobacco: Never Used   Substance and Sexual Activity    Alcohol use: Yes     Alcohol/week: 2 0 standard drinks     Types: 2 Standard drinks or equivalent per week     Frequency: 2-4 times a month     Comment: socially    Drug use: No    Sexual activity: Not on file   Lifestyle    Physical activity:     Days per week: Not on file     Minutes per session: Not on file    Stress: Not on file   Relationships    Social connections:     Talks on phone: Not on file     Gets together: Not on file     Attends Mandaen service: Not on file     Active member of club or organization: Not on file     Attends meetings of clubs or organizations: Not on file     Relationship status: Not on file    Intimate partner violence:     Fear of current or ex partner: Not on file     Emotionally abused: Not on file     Physically abused: Not on file     Forced sexual activity: Not on file   Other Topics Concern    Not on file   Social History Narrative      2 sons    2 Grandkids       Current Outpatient Medications:     Brexpiprazole (REXULTI) 0 5 MG tablet, Take 0 5 mg by mouth daily, Disp: , Rfl:     citalopram (CeleXA) 20 mg tablet, Take 1 tablet (20 mg total) by mouth daily, Disp: 30 tablet, Rfl: 2    meloxicam (MOBIC) 7 5 mg tablet, Take 1 tablet (7 5 mg total) by mouth daily (Patient not taking: Reported on 7/23/2019), Disp: 30 tablet, Rfl: 0  Allergies   Allergen Reactions    Prednisone Other (See Comments) and Confusion     Psychosis      Vitals:    08/01/19 1451   BP: 92/68   Pulse: 62   Resp: 18   Temp: 98 8 °F (37 1 °C)       Physical Exam   Constitutional: General appearance:  The Patient is well-developed and well-nourished who appears the stated age in no acute distress  Patient is pleasant and talkative  HEENT:  Normocephalic  Sclerae are anicteric  Mucous membranes are moist  Neck is supple without adenopathy  No JVD  Chest: The lungs are clear to auscultation  Cardiac: Heart is regular rate  Abdomen: Abdomen is soft, non-tender, non-distended and without masses  Extremities: There is no clubbing or cyanosis  There is no edema  Symmetric  Neuro: Grossly nonfocal  Gait is normal      Lymphatic: No evidence of cervical adenopathy bilaterally  No evidence of axillary adenopathy bilaterally  No evidence of inguinal adenopathy bilaterally  Skin: Warm, anicteric  Psych:  Patient is pleasant and talkative  Breasts:      Pathology:  Final Diagnosis   A  Terminal ileum, polyp, endoscopic biopsy:  - Neuroendocrine tumor (carcinoid) involving small intestinal lamina propria and muscularis    mucosae  See note      B  Colon, random endoscopic biopsy:  - Colonic mucosa with no pathologic alteration    - No evidence of lymphocytic or collagenous colitis      Note: The H & E and immunohistochemistry stained slides were sent to Dr Rufus Hammans, Department of pathology at [place] [address], for her expert opinion and review, and the above reflects her diagnosis  Her letter with diagnosis reads as below      Dr Alvaro Cha was notified of preliminary findings via phone on 6/11/2019 at 3:10 pm       Electronically signed by Nina Krishnan MD on 6/25/2019 at  2:24 PM          Labs:  Chromogranin A 0 - 5 nmol/L 3          Imaging  Nm Pet Ct Skull Base To Mid Thigh    Result Date: 7/9/2019  Narrative: NETSPOT PET/CT SCAN  INDICATION:   D3A 012: Benign carcinoid tumor of the ileum  MODIFIER: PI      COMPARISON:  PET CT 6/24/2019  CELL TYPE:  Neuroendocrine tumor, carcinoid, terminal ileum polyp biopsy 6/7/2019  TECHNIQUE:   4 1 mCi Gallium-68 Dotatate Netspot administered IV   Multiplanar attenuation corrected and non-attenuation corrected PET images are available for interpretation  Contiguous, low dose, axial CT sections were obtained from the vertex through  the femurs for anatomic localization  Intravenous contrast was not utilized  FINDINGS:   BRAIN:    Normal pituitary gland uptake is demonstrated  No acute abnormalities are seen  HEAD/NECK:  There is a physiologic distribution of the radiotracer  Normal salivary gland and thyroid uptake is demonstrated  CT images:  Unremarkable  CHEST:   There is a physiologic distribution of the radiotracer  CT images: Stable  ABDOMEN: There is focally intense radiotracer activity in the terminal ileum, SUV 71, corresponding with known neuroendocrine tumor/carcinoid  This measures approximately 2 4 x 1 8 cm  Adjacent enlarged mesenteric node series 3 image 222 also demonstrates intense radiotracer activity, compatible with metastasis  This measures 2 7 x 1 9 cm, SUV 95 2  There are multiple right hepatic lesions with intense radio tracer activity, most compatible with metastases  These correspond with the lesion seen on prior contrast CT  Largest lesion series 3 image 167 measures approximately 2 2 x 2 4 cm, SUV 49 8  CT images: Otherwise stable  PELVIS:  There is a physiologic distribution of the radiotracer  CT images: Stable  OSSEOUS STRUCTURES:   There is a physiologic distribution of the radiotracer  CT images: Scoliosis  Impression: 1  Intense radiotracer activity in the terminal ileum corresponding with known tumor  Adjacent metastatic mesenteric node  2   Multiple liver metastases visualized  3   No metastases visualized in the neck or thorax  Workstation performed: [id number]     I reviewed the above laboratory and imaging data  Discussion/Summary:  72-year-old female with metastatic carcinoid originating in the terminal ileum  She has known metastasis to the liver    We had a long discussion regarding treatment options these included resection of the small bowel with continued octreotide therapy, liver directed therapy as well as aggressive surgical treatment including small-bowel resection with possible hemicolectomy, intraoperative ultrasound of the liver, cholecystectomy and ablation of the liver Mets  She is not sure how aggressive she wishes to be, but she does wish to have the primary removed  I did recommend this as there is dated to suggest that removing the primary to will improve her long-term survival   After some discussion she has agreed to small-bowel resection possible hemicolectomy  At the time of surgery we will perform an intraoperative ultrasound of the liver in if at that time it appears that ablation of several of these lesions would confer a survival benefit we will proceed with cholecystectomy and ablation of the liver lesions  Risks and benefits were explained including bleeding, infection, need for further surgery, wound complications, adjacent organ injury and anastomotic leak  Informed consent was obtained  We will schedule this at our earliest mutual convenience  She and her  are agreeable to this  All their questions were answered

## 2019-08-12 ENCOUNTER — APPOINTMENT (OUTPATIENT)
Dept: LAB | Facility: HOSPITAL | Age: 59
End: 2019-08-12
Attending: INTERNAL MEDICINE
Payer: COMMERCIAL

## 2019-08-12 NOTE — PRE-PROCEDURE INSTRUCTIONS
Pre-Surgery Instructions:   Medication Instructions    Brexpiprazole (REXULTI) 0 5 MG tablet Instructed patient per Anesthesia Guidelines   citalopram (CeleXA) 20 mg tablet Instructed patient per Anesthesia Guidelines  Continue to take this medication on your normal schedule  If this is an oral medication and you take it in the morning, then you may take this medicine with a sip of water  Antipsychotic Med Class     Continue to take this medication on your normal schedule  If this is an oral medication and you take it in the morning, then you may take this medicine with a sip of water

## 2019-08-12 NOTE — PRE-PROCEDURE INSTRUCTIONS
Pre-Surgery Instructions:   Medication Instructions    Brexpiprazole (REXULTI) 0 5 MG tablet Instructed patient per Anesthesia Guidelines   citalopram (CeleXA) 20 mg tablet Instructed patient per Anesthesia Guidelines

## 2019-08-19 ENCOUNTER — HOSPITAL ENCOUNTER (OUTPATIENT)
Dept: INFUSION CENTER | Facility: CLINIC | Age: 59
Discharge: HOME | End: 2019-08-19
Payer: COMMERCIAL

## 2019-08-19 DIAGNOSIS — D3A.012 CARCINOID TUMOR OF ILEUM: Primary | ICD-10-CM

## 2019-08-19 PROCEDURE — 96372 THER/PROPH/DIAG INJ SC/IM: CPT

## 2019-08-19 RX ADMIN — OCTREOTIDE ACETATE 30 MG: KIT at 11:49

## 2019-08-19 NOTE — PROGRESS NOTES
Pt here for Sandostatin, offers no complaints  Sandostatin given in R gluteal site without any issues  Pt aware of next appointment  AVS provided

## 2019-08-21 ENCOUNTER — ANESTHESIA EVENT (OUTPATIENT)
Dept: PERIOP | Facility: HOSPITAL | Age: 59
DRG: 330 | End: 2019-08-21
Payer: COMMERCIAL

## 2019-08-22 ENCOUNTER — ANESTHESIA (OUTPATIENT)
Dept: PERIOP | Facility: HOSPITAL | Age: 59
DRG: 330 | End: 2019-08-22
Payer: COMMERCIAL

## 2019-08-22 ENCOUNTER — HOSPITAL ENCOUNTER (INPATIENT)
Facility: HOSPITAL | Age: 59
LOS: 5 days | Discharge: HOME HEALTH | DRG: 330 | End: 2019-08-27
Attending: SURGERY | Admitting: SURGERY
Payer: COMMERCIAL

## 2019-08-22 DIAGNOSIS — D3A.012 CARCINOID TUMOR OF ILEUM: ICD-10-CM

## 2019-08-22 LAB
ABO GROUP BLD: NORMAL
ANION GAP SERPL CALCULATED.3IONS-SCNC: 10 MMOL/L (ref 4–13)
BASE EXCESS BLDA CALC-SCNC: -3 MMOL/L (ref -2–3)
BLD GP AB SCN SERPL QL: NEGATIVE
BUN SERPL-MCNC: 6 MG/DL (ref 5–25)
CA-I BLD-SCNC: 1.23 MMOL/L (ref 1.12–1.32)
CALCIUM SERPL-MCNC: 7.7 MG/DL (ref 8.3–10.1)
CHLORIDE SERPL-SCNC: 114 MMOL/L (ref 100–108)
CO2 SERPL-SCNC: 22 MMOL/L (ref 21–32)
CREAT SERPL-MCNC: 0.59 MG/DL (ref 0.6–1.3)
ERYTHROCYTE [DISTWIDTH] IN BLOOD BY AUTOMATED COUNT: 12 % (ref 11.6–15.1)
GFR SERPL CREATININE-BSD FRML MDRD: 102 ML/MIN/1.73SQ M
GLUCOSE SERPL-MCNC: 120 MG/DL (ref 65–140)
GLUCOSE SERPL-MCNC: 162 MG/DL (ref 65–140)
GLUCOSE SERPL-MCNC: 171 MG/DL (ref 65–140)
HCO3 BLDA-SCNC: 23.1 MMOL/L (ref 24–30)
HCT VFR BLD AUTO: 28.2 % (ref 34.8–46.1)
HCT VFR BLD CALC: 28 % (ref 34.8–46.1)
HGB BLD-MCNC: 9.4 G/DL (ref 11.5–15.4)
HGB BLDA-MCNC: 9.5 G/DL (ref 11.5–15.4)
MCH RBC QN AUTO: 31 PG (ref 26.8–34.3)
MCHC RBC AUTO-ENTMCNC: 33.3 G/DL (ref 31.4–37.4)
MCV RBC AUTO: 93 FL (ref 82–98)
PCO2 BLD: 24 MMOL/L (ref 21–32)
PCO2 BLD: 45.1 MM HG (ref 42–50)
PH BLD: 7.32 [PH] (ref 7.3–7.4)
PLATELET # BLD AUTO: 182 THOUSANDS/UL (ref 149–390)
PMV BLD AUTO: 10.6 FL (ref 8.9–12.7)
PO2 BLD: 120 MM HG (ref 35–45)
POTASSIUM BLD-SCNC: 3 MMOL/L (ref 3.5–5.3)
POTASSIUM SERPL-SCNC: 3.6 MMOL/L (ref 3.5–5.3)
RBC # BLD AUTO: 3.03 MILLION/UL (ref 3.81–5.12)
RH BLD: POSITIVE
SAO2 % BLD FROM PO2: 98 % (ref 95–98)
SODIUM BLD-SCNC: 142 MMOL/L (ref 136–145)
SODIUM SERPL-SCNC: 146 MMOL/L (ref 136–145)
SPECIMEN EXPIRATION DATE: NORMAL
SPECIMEN SOURCE: ABNORMAL
WBC # BLD AUTO: 12.94 THOUSAND/UL (ref 4.31–10.16)

## 2019-08-22 PROCEDURE — 88342 IMHCHEM/IMCYTCHM 1ST ANTB: CPT | Performed by: PATHOLOGY

## 2019-08-22 PROCEDURE — 85027 COMPLETE CBC AUTOMATED: CPT | Performed by: SURGERY

## 2019-08-22 PROCEDURE — 88341 IMHCHEM/IMCYTCHM EA ADD ANTB: CPT | Performed by: PATHOLOGY

## 2019-08-22 PROCEDURE — 88304 TISSUE EXAM BY PATHOLOGIST: CPT | Performed by: PATHOLOGY

## 2019-08-22 PROCEDURE — 86901 BLOOD TYPING SEROLOGIC RH(D): CPT | Performed by: SURGERY

## 2019-08-22 PROCEDURE — C1886 CATHETER, ABLATION: HCPCS | Performed by: SURGERY

## 2019-08-22 PROCEDURE — 82330 ASSAY OF CALCIUM: CPT

## 2019-08-22 PROCEDURE — 30233N1 TRANSFUSION OF NONAUTOLOGOUS RED BLOOD CELLS INTO PERIPHERAL VEIN, PERCUTANEOUS APPROACH: ICD-10-PCS | Performed by: SURGERY

## 2019-08-22 PROCEDURE — 80048 BASIC METABOLIC PNL TOTAL CA: CPT | Performed by: SURGERY

## 2019-08-22 PROCEDURE — 76998 US GUIDE INTRAOP: CPT | Performed by: SURGERY

## 2019-08-22 PROCEDURE — 86900 BLOOD TYPING SEROLOGIC ABO: CPT | Performed by: SURGERY

## 2019-08-22 PROCEDURE — 84295 ASSAY OF SERUM SODIUM: CPT

## 2019-08-22 PROCEDURE — 0FT40ZZ RESECTION OF GALLBLADDER, OPEN APPROACH: ICD-10-PCS | Performed by: SURGERY

## 2019-08-22 PROCEDURE — 0DTK0ZZ RESECTION OF ASCENDING COLON, OPEN APPROACH: ICD-10-PCS | Performed by: SURGERY

## 2019-08-22 PROCEDURE — 84132 ASSAY OF SERUM POTASSIUM: CPT

## 2019-08-22 PROCEDURE — 82948 REAGENT STRIP/BLOOD GLUCOSE: CPT

## 2019-08-22 PROCEDURE — 88360 TUMOR IMMUNOHISTOCHEM/MANUAL: CPT | Performed by: PATHOLOGY

## 2019-08-22 PROCEDURE — 85014 HEMATOCRIT: CPT

## 2019-08-22 PROCEDURE — 82947 ASSAY GLUCOSE BLOOD QUANT: CPT

## 2019-08-22 PROCEDURE — 47380 OPEN ABLATE LIVER TUMOR RF: CPT | Performed by: SURGERY

## 2019-08-22 PROCEDURE — 86850 RBC ANTIBODY SCREEN: CPT | Performed by: SURGERY

## 2019-08-22 PROCEDURE — 88309 TISSUE EXAM BY PATHOLOGIST: CPT | Performed by: PATHOLOGY

## 2019-08-22 PROCEDURE — 82803 BLOOD GASES ANY COMBINATION: CPT

## 2019-08-22 PROCEDURE — 0F500ZF DESTRUCTION OF LIVER USING IRREVERSIBLE ELECTROPORATION, OPEN APPROACH: ICD-10-PCS | Performed by: SURGERY

## 2019-08-22 PROCEDURE — 86923 COMPATIBILITY TEST ELECTRIC: CPT

## 2019-08-22 PROCEDURE — 44140 PARTIAL REMOVAL OF COLON: CPT | Performed by: SURGERY

## 2019-08-22 RX ORDER — ONDANSETRON 2 MG/ML
INJECTION INTRAMUSCULAR; INTRAVENOUS AS NEEDED
Status: DISCONTINUED | OUTPATIENT
Start: 2019-08-22 | End: 2019-08-22 | Stop reason: SURG

## 2019-08-22 RX ORDER — HEPARIN SODIUM 5000 [USP'U]/ML
5000 INJECTION, SOLUTION INTRAVENOUS; SUBCUTANEOUS EVERY 12 HOURS SCHEDULED
Status: DISCONTINUED | OUTPATIENT
Start: 2019-08-22 | End: 2019-08-22

## 2019-08-22 RX ORDER — GLYCOPYRROLATE 0.2 MG/ML
INJECTION INTRAMUSCULAR; INTRAVENOUS AS NEEDED
Status: DISCONTINUED | OUTPATIENT
Start: 2019-08-22 | End: 2019-08-22 | Stop reason: SURG

## 2019-08-22 RX ORDER — CEFAZOLIN SODIUM 1 G/50ML
1000 SOLUTION INTRAVENOUS ONCE
Status: COMPLETED | OUTPATIENT
Start: 2019-08-22 | End: 2019-08-22

## 2019-08-22 RX ORDER — FENTANYL CITRATE/PF 50 MCG/ML
50 SYRINGE (ML) INJECTION
Status: COMPLETED | OUTPATIENT
Start: 2019-08-22 | End: 2019-08-22

## 2019-08-22 RX ORDER — HYDROMORPHONE HCL/PF 1 MG/ML
0.4 SYRINGE (ML) INJECTION
Status: DISCONTINUED | OUTPATIENT
Start: 2019-08-22 | End: 2019-08-22 | Stop reason: HOSPADM

## 2019-08-22 RX ORDER — ROCURONIUM BROMIDE 10 MG/ML
INJECTION, SOLUTION INTRAVENOUS AS NEEDED
Status: DISCONTINUED | OUTPATIENT
Start: 2019-08-22 | End: 2019-08-22 | Stop reason: SURG

## 2019-08-22 RX ORDER — LIDOCAINE HYDROCHLORIDE 10 MG/ML
INJECTION, SOLUTION INFILTRATION; PERINEURAL AS NEEDED
Status: DISCONTINUED | OUTPATIENT
Start: 2019-08-22 | End: 2019-08-22 | Stop reason: SURG

## 2019-08-22 RX ORDER — METOCLOPRAMIDE HYDROCHLORIDE 5 MG/ML
INJECTION INTRAMUSCULAR; INTRAVENOUS AS NEEDED
Status: DISCONTINUED | OUTPATIENT
Start: 2019-08-22 | End: 2019-08-22 | Stop reason: SURG

## 2019-08-22 RX ORDER — FENTANYL CITRATE 50 UG/ML
INJECTION, SOLUTION INTRAMUSCULAR; INTRAVENOUS AS NEEDED
Status: DISCONTINUED | OUTPATIENT
Start: 2019-08-22 | End: 2019-08-22 | Stop reason: SURG

## 2019-08-22 RX ORDER — ROPIVACAINE HYDROCHLORIDE 2 MG/ML
INJECTION, SOLUTION EPIDURAL; INFILTRATION; PERINEURAL AS NEEDED
Status: DISCONTINUED | OUTPATIENT
Start: 2019-08-22 | End: 2019-08-22 | Stop reason: SURG

## 2019-08-22 RX ORDER — OCTREOTIDE ACETATE 500 UG/ML
150 INJECTION, SOLUTION INTRAVENOUS; SUBCUTANEOUS ONCE AS NEEDED
Status: DISCONTINUED | OUTPATIENT
Start: 2019-08-22 | End: 2019-08-22 | Stop reason: HOSPADM

## 2019-08-22 RX ORDER — ALBUMIN, HUMAN INJ 5% 5 %
12.5 SOLUTION INTRAVENOUS ONCE
Status: COMPLETED | OUTPATIENT
Start: 2019-08-22 | End: 2019-08-22

## 2019-08-22 RX ORDER — PREGABALIN 75 MG/1
150 CAPSULE ORAL ONCE
Status: COMPLETED | OUTPATIENT
Start: 2019-08-22 | End: 2019-08-22

## 2019-08-22 RX ORDER — SODIUM CHLORIDE, SODIUM LACTATE, POTASSIUM CHLORIDE, CALCIUM CHLORIDE 600; 310; 30; 20 MG/100ML; MG/100ML; MG/100ML; MG/100ML
125 INJECTION, SOLUTION INTRAVENOUS CONTINUOUS
Status: DISCONTINUED | OUTPATIENT
Start: 2019-08-22 | End: 2019-08-22

## 2019-08-22 RX ORDER — MAGNESIUM HYDROXIDE 1200 MG/15ML
LIQUID ORAL AS NEEDED
Status: DISCONTINUED | OUTPATIENT
Start: 2019-08-22 | End: 2019-08-22 | Stop reason: HOSPADM

## 2019-08-22 RX ORDER — ALBUMIN, HUMAN INJ 5% 5 %
SOLUTION INTRAVENOUS CONTINUOUS PRN
Status: DISCONTINUED | OUTPATIENT
Start: 2019-08-22 | End: 2019-08-22 | Stop reason: SURG

## 2019-08-22 RX ORDER — MIDAZOLAM HYDROCHLORIDE 1 MG/ML
INJECTION INTRAMUSCULAR; INTRAVENOUS AS NEEDED
Status: DISCONTINUED | OUTPATIENT
Start: 2019-08-22 | End: 2019-08-22 | Stop reason: SURG

## 2019-08-22 RX ORDER — PROMETHAZINE HYDROCHLORIDE 25 MG/ML
12.5 INJECTION, SOLUTION INTRAMUSCULAR; INTRAVENOUS ONCE AS NEEDED
Status: DISCONTINUED | OUTPATIENT
Start: 2019-08-22 | End: 2019-08-22 | Stop reason: HOSPADM

## 2019-08-22 RX ORDER — HYDROMORPHONE HCL/PF 1 MG/ML
0.5 SYRINGE (ML) INJECTION EVERY 2 HOUR PRN
Status: DISCONTINUED | OUTPATIENT
Start: 2019-08-22 | End: 2019-08-25

## 2019-08-22 RX ORDER — NEOSTIGMINE METHYLSULFATE 1 MG/ML
INJECTION INTRAVENOUS AS NEEDED
Status: DISCONTINUED | OUTPATIENT
Start: 2019-08-22 | End: 2019-08-22 | Stop reason: SURG

## 2019-08-22 RX ORDER — ACETAMINOPHEN 325 MG/1
975 TABLET ORAL ONCE
Status: COMPLETED | OUTPATIENT
Start: 2019-08-22 | End: 2019-08-22

## 2019-08-22 RX ORDER — LIDOCAINE HYDROCHLORIDE 10 MG/ML
0.5 INJECTION, SOLUTION EPIDURAL; INFILTRATION; INTRACAUDAL; PERINEURAL ONCE AS NEEDED
Status: COMPLETED | OUTPATIENT
Start: 2019-08-22 | End: 2019-08-22

## 2019-08-22 RX ORDER — SODIUM CHLORIDE 9 MG/ML
INJECTION, SOLUTION INTRAVENOUS CONTINUOUS PRN
Status: DISCONTINUED | OUTPATIENT
Start: 2019-08-22 | End: 2019-08-22 | Stop reason: SURG

## 2019-08-22 RX ORDER — HEPARIN SODIUM 5000 [USP'U]/ML
5000 INJECTION, SOLUTION INTRAVENOUS; SUBCUTANEOUS EVERY 8 HOURS SCHEDULED
Status: DISCONTINUED | OUTPATIENT
Start: 2019-08-22 | End: 2019-08-27 | Stop reason: HOSPADM

## 2019-08-22 RX ORDER — PROPOFOL 10 MG/ML
INJECTION, EMULSION INTRAVENOUS AS NEEDED
Status: DISCONTINUED | OUTPATIENT
Start: 2019-08-22 | End: 2019-08-22 | Stop reason: SURG

## 2019-08-22 RX ORDER — POTASSIUM CHLORIDE 14.9 MG/ML
INJECTION INTRAVENOUS CONTINUOUS PRN
Status: DISCONTINUED | OUTPATIENT
Start: 2019-08-22 | End: 2019-08-22 | Stop reason: SURG

## 2019-08-22 RX ORDER — DEXMEDETOMIDINE HYDROCHLORIDE 100 UG/ML
INJECTION, SOLUTION INTRAVENOUS AS NEEDED
Status: DISCONTINUED | OUTPATIENT
Start: 2019-08-22 | End: 2019-08-22 | Stop reason: SURG

## 2019-08-22 RX ORDER — LIDOCAINE HYDROCHLORIDE AND EPINEPHRINE 15; 5 MG/ML; UG/ML
INJECTION, SOLUTION EPIDURAL
Status: COMPLETED | OUTPATIENT
Start: 2019-08-22 | End: 2019-08-22

## 2019-08-22 RX ORDER — EPHEDRINE SULFATE 50 MG/ML
INJECTION INTRAVENOUS AS NEEDED
Status: DISCONTINUED | OUTPATIENT
Start: 2019-08-22 | End: 2019-08-22 | Stop reason: SURG

## 2019-08-22 RX ORDER — SODIUM CHLORIDE 9 MG/ML
125 INJECTION, SOLUTION INTRAVENOUS CONTINUOUS
Status: DISCONTINUED | OUTPATIENT
Start: 2019-08-22 | End: 2019-08-23

## 2019-08-22 RX ORDER — ONDANSETRON 2 MG/ML
4 INJECTION INTRAMUSCULAR; INTRAVENOUS EVERY 6 HOURS PRN
Status: DISCONTINUED | OUTPATIENT
Start: 2019-08-22 | End: 2019-08-23 | Stop reason: SDUPTHER

## 2019-08-22 RX ORDER — CITALOPRAM 20 MG/1
20 TABLET ORAL DAILY
Status: DISCONTINUED | OUTPATIENT
Start: 2019-08-22 | End: 2019-08-27 | Stop reason: HOSPADM

## 2019-08-22 RX ADMIN — ROPIVACAINE HYDROCHLORIDE: 5 INJECTION, SOLUTION EPIDURAL; INFILTRATION; PERINEURAL at 15:15

## 2019-08-22 RX ADMIN — PHENYLEPHRINE HYDROCHLORIDE 100 MCG: 10 INJECTION INTRAVENOUS at 13:44

## 2019-08-22 RX ADMIN — METOCLOPRAMIDE 5 MG: 5 INJECTION, SOLUTION INTRAMUSCULAR; INTRAVENOUS at 14:34

## 2019-08-22 RX ADMIN — SODIUM CHLORIDE, SODIUM LACTATE, POTASSIUM CHLORIDE, AND CALCIUM CHLORIDE 125 ML/HR: .6; .31; .03; .02 INJECTION, SOLUTION INTRAVENOUS at 10:04

## 2019-08-22 RX ADMIN — EPHEDRINE SULFATE 10 MG: 50 INJECTION, SOLUTION INTRAVENOUS at 12:28

## 2019-08-22 RX ADMIN — SODIUM CHLORIDE 125 ML/HR: 0.9 INJECTION, SOLUTION INTRAVENOUS at 17:20

## 2019-08-22 RX ADMIN — NEOSTIGMINE METHYLSULFATE 3 MG: 1 INJECTION, SOLUTION INTRAVENOUS at 14:54

## 2019-08-22 RX ADMIN — DEXMEDETOMIDINE HCL 8 MCG: 100 INJECTION INTRAVENOUS at 14:30

## 2019-08-22 RX ADMIN — ROCURONIUM BROMIDE 20 MG: 10 INJECTION INTRAVENOUS at 13:06

## 2019-08-22 RX ADMIN — ROCURONIUM BROMIDE 15 MG: 10 INJECTION INTRAVENOUS at 14:21

## 2019-08-22 RX ADMIN — LIDOCAINE HYDROCHLORIDE AND EPINEPHRINE 3 ML: 15; 5 INJECTION, SOLUTION EPIDURAL at 11:37

## 2019-08-22 RX ADMIN — NEOSTIGMINE METHYLSULFATE 2 MG: 1 INJECTION, SOLUTION INTRAVENOUS at 14:58

## 2019-08-22 RX ADMIN — FENTANYL CITRATE 50 MCG: 50 INJECTION, SOLUTION INTRAMUSCULAR; INTRAVENOUS at 15:35

## 2019-08-22 RX ADMIN — EPHEDRINE SULFATE 5 MG: 50 INJECTION, SOLUTION INTRAVENOUS at 12:18

## 2019-08-22 RX ADMIN — CEFAZOLIN SODIUM 1000 MG: 1 SOLUTION INTRAVENOUS at 12:00

## 2019-08-22 RX ADMIN — METRONIDAZOLE 500 MG: 500 INJECTION, SOLUTION INTRAVENOUS at 12:00

## 2019-08-22 RX ADMIN — HEPARIN SODIUM 5000 UNITS: 5000 INJECTION INTRAVENOUS; SUBCUTANEOUS at 21:23

## 2019-08-22 RX ADMIN — PROPOFOL 150 MG: 10 INJECTION, EMULSION INTRAVENOUS at 11:44

## 2019-08-22 RX ADMIN — ONDANSETRON 4 MG: 2 INJECTION INTRAMUSCULAR; INTRAVENOUS at 14:34

## 2019-08-22 RX ADMIN — SODIUM CHLORIDE, SODIUM LACTATE, POTASSIUM CHLORIDE, AND CALCIUM CHLORIDE: .6; .31; .03; .02 INJECTION, SOLUTION INTRAVENOUS at 12:37

## 2019-08-22 RX ADMIN — CALCIUM GLUCONATE 2 G: 98 INJECTION, SOLUTION INTRAVENOUS at 17:40

## 2019-08-22 RX ADMIN — FENTANYL CITRATE 50 MCG: 50 INJECTION, SOLUTION INTRAMUSCULAR; INTRAVENOUS at 18:43

## 2019-08-22 RX ADMIN — GLYCOPYRROLATE 0.2 MG: 0.2 INJECTION, SOLUTION INTRAMUSCULAR; INTRAVENOUS at 14:58

## 2019-08-22 RX ADMIN — SODIUM CHLORIDE: 0.9 INJECTION, SOLUTION INTRAVENOUS at 11:50

## 2019-08-22 RX ADMIN — LIDOCAINE HYDROCHLORIDE 0.5 ML: 10 INJECTION, SOLUTION EPIDURAL; INFILTRATION; INTRACAUDAL; PERINEURAL at 10:04

## 2019-08-22 RX ADMIN — ALBUMIN (HUMAN): 12.5 SOLUTION INTRAVENOUS at 12:37

## 2019-08-22 RX ADMIN — EPHEDRINE SULFATE 5 MG: 50 INJECTION, SOLUTION INTRAVENOUS at 12:26

## 2019-08-22 RX ADMIN — PREGABALIN 150 MG: 75 CAPSULE ORAL at 09:33

## 2019-08-22 RX ADMIN — PHENYLEPHRINE HYDROCHLORIDE 100 MCG: 10 INJECTION INTRAVENOUS at 13:14

## 2019-08-22 RX ADMIN — FENTANYL CITRATE 50 MCG: 50 INJECTION, SOLUTION INTRAMUSCULAR; INTRAVENOUS at 11:10

## 2019-08-22 RX ADMIN — FENTANYL CITRATE 50 MCG: 50 INJECTION, SOLUTION INTRAMUSCULAR; INTRAVENOUS at 13:38

## 2019-08-22 RX ADMIN — GLYCOPYRROLATE 0.4 MG: 0.2 INJECTION, SOLUTION INTRAMUSCULAR; INTRAVENOUS at 14:54

## 2019-08-22 RX ADMIN — DEXMEDETOMIDINE HCL 8 MCG: 100 INJECTION INTRAVENOUS at 14:36

## 2019-08-22 RX ADMIN — ACETAMINOPHEN 975 MG: 325 TABLET ORAL at 09:33

## 2019-08-22 RX ADMIN — SODIUM CHLORIDE: 0.9 INJECTION, SOLUTION INTRAVENOUS at 12:00

## 2019-08-22 RX ADMIN — PHENYLEPHRINE HYDROCHLORIDE 100 MCG: 10 INJECTION INTRAVENOUS at 12:33

## 2019-08-22 RX ADMIN — CITALOPRAM HYDROBROMIDE 20 MG: 20 TABLET, FILM COATED ORAL at 19:39

## 2019-08-22 RX ADMIN — ROPIVACAINE HYDROCHLORIDE 3 ML: 2 INJECTION, SOLUTION EPIDURAL; INFILTRATION at 14:44

## 2019-08-22 RX ADMIN — MIDAZOLAM 2 MG: 1 INJECTION INTRAMUSCULAR; INTRAVENOUS at 11:10

## 2019-08-22 RX ADMIN — EPHEDRINE SULFATE 10 MG: 50 INJECTION, SOLUTION INTRAVENOUS at 13:10

## 2019-08-22 RX ADMIN — ROPIVACAINE HYDROCHLORIDE 5 ML: 2 INJECTION, SOLUTION EPIDURAL; INFILTRATION at 14:38

## 2019-08-22 RX ADMIN — ROCURONIUM BROMIDE 50 MG: 10 INJECTION INTRAVENOUS at 11:44

## 2019-08-22 RX ADMIN — ALBUMIN (HUMAN) 12.5 G: 12.5 SOLUTION INTRAVENOUS at 17:03

## 2019-08-22 RX ADMIN — POTASSIUM CHLORIDE: 200 INJECTION, SOLUTION INTRAVENOUS at 13:49

## 2019-08-22 RX ADMIN — EPHEDRINE SULFATE 10 MG: 50 INJECTION, SOLUTION INTRAVENOUS at 12:32

## 2019-08-22 RX ADMIN — LIDOCAINE HYDROCHLORIDE 50 MG: 10 INJECTION, SOLUTION INFILTRATION; PERINEURAL at 11:44

## 2019-08-22 NOTE — INTERVAL H&P NOTE
H&P reviewed  After examining the patient I find no changes in the patients condition since the H&P had been written      Vitals:    08/22/19 0950   BP: 128/82   Pulse: 64   Resp: 16   Temp: 98 2 °F (36 8 °C)   SpO2: 96%

## 2019-08-22 NOTE — ANESTHESIA PROCEDURE NOTES
Epidural Block    Patient location during procedure: pre-op  Start time: 8/22/2019 11:24 AM  Reason for block: at surgeon's request and post-op pain management  Staffing  Anesthesiologist: Aarti Pearce MD  Performed: anesthesiologist   Preanesthetic Checklist  Completed: patient identified, surgical consent, pre-op evaluation, timeout performed, IV checked, risks and benefits discussed and monitors and equipment checked  Epidural  Patient position: sitting  Prep: Betadine  Patient monitoring: heart rate, continuous pulse ox and frequent blood pressure checks  Approach: midline  Location: thoracic (1-12)  Injection technique: MARIA ALEJANDRA saline  Needle  Needle type: Tuohy   Needle gauge: 18 G  Catheter type: end hole  Catheter size: 20 G  Catheter at skin depth: 13 cm  Test dose: negativelidocaine 1 5% with epinephrine 1:200,000 test dose, 3 mLnegative aspiration for CSF, negative aspiration for heme and no paresthesia on injection  patient tolerated the procedure well with no immediate complications

## 2019-08-22 NOTE — ANESTHESIA POSTPROCEDURE EVALUATION
Post-Op Assessment Note    CV Status:  Stable    Pain management: adequate     Mental Status:  Alert and awake   Hydration Status:  Euvolemic   PONV Controlled:  Controlled   Airway Patency:  Patent   Post Op Vitals Reviewed: Yes      Staff: Anesthesiologist, CRNA     Post-op block assessment: catheter intact, no complications and secured with tape        BP   132/65   Temp   97 8   Pulse  72   Resp   16   SpO2   99

## 2019-08-22 NOTE — ANESTHESIA PROCEDURE NOTES
Arterial Line Insertion  Performed by: Jane Garcia MD  Authorized by: Jane Garcia MD   Consent: Verbal consent obtained  Risks and benefits: risks, benefits and alternatives were discussed  Consent given by: patient  Patient understanding: patient states understanding of the procedure being performed  Preparation: Patient was prepped and draped in the usual sterile fashion    Indications: hemodynamic monitoring  Orientation:  Left  Location: radial artery  Procedure Details:  Needle gauge: 20  Seldinger technique: Seldinger technique used  Number of attempts: 1    Post-procedure:  Post-procedure: dressing applied  Waveform: good waveform  Post-procedure CNS: normal  Patient tolerance: Patient tolerated the procedure well with no immediate complications

## 2019-08-22 NOTE — ANESTHESIA PREPROCEDURE EVALUATION
Review of Systems/Medical History  Patient summary reviewed  Chart reviewed  No history of anesthetic complications     Cardiovascular  Exercise tolerance (METS): >4,     Pulmonary  Negative pulmonary ROS Not a smoker , No recent URI ,        GI/Hepatic    Liver disease (lvier metastases) , Bowel prep  Comment: Carcinoid tumor of terminal ileum  Confirmed NPO appropriate          Endo/Other  Negative endo/other ROS      GYN  Negative gynecology ROS   Hysterectomy,        Hematology  Negative hematology ROS      Musculoskeletal  Negative musculoskeletal ROS        Neurology  Negative neurology ROS      Psychology   Depression ,              Physical Exam    Airway    Mallampati score: II  TM Distance: >3 FB  Neck ROM: full     Dental   No notable dental hx     Cardiovascular  Rhythm: regular, Rate: normal, Cardiovascular exam normal    Pulmonary  Pulmonary exam normal Breath sounds clear to auscultation,     Other Findings        Anesthesia Plan  ASA Score- 2     Anesthesia Type- general and epidural with ASA Monitors  Additional Monitors: arterial line  Airway Plan: ETT  Plan Factors-    Induction- intravenous  Postoperative Plan- Plan for postoperative opioid use  Informed Consent- Anesthetic plan and risks discussed with patient and spouse  I personally reviewed this patient with the CRNA  Discussed and agreed on the Anesthesia Plan with the CRNA             Lab Results   Component Value Date    WBC 8 17 08/12/2019    HGB 13 1 08/12/2019    HCT 38 1 08/12/2019    MCV 91 08/12/2019     08/12/2019     Lab Results   Component Value Date    SODIUM 141 08/12/2019    K 4 1 08/12/2019     08/12/2019    CO2 27 08/12/2019    BUN 15 08/12/2019    CREATININE 0 73 08/12/2019    GLUC 104 07/02/2019    CALCIUM 9 4 08/12/2019     Lab Results   Component Value Date    ALT 38 08/12/2019    AST 19 08/12/2019    ALKPHOS 74 08/12/2019     Lab Results   Component Value Date    INR 0 96 08/01/2019 PROTIME 12 8 08/01/2019     Lab Results   Component Value Date    HGBA1C 5 6 08/01/2019

## 2019-08-23 LAB
ABO GROUP BLD BPU: NORMAL
ABO GROUP BLD BPU: NORMAL
ANION GAP SERPL CALCULATED.3IONS-SCNC: 6 MMOL/L (ref 4–13)
BASOPHILS # BLD AUTO: 0.04 THOUSANDS/ΜL (ref 0–0.1)
BASOPHILS NFR BLD AUTO: 0 % (ref 0–1)
BPU ID: NORMAL
BPU ID: NORMAL
BUN SERPL-MCNC: 5 MG/DL (ref 5–25)
CALCIUM SERPL-MCNC: 8.1 MG/DL (ref 8.3–10.1)
CHLORIDE SERPL-SCNC: 110 MMOL/L (ref 100–108)
CO2 SERPL-SCNC: 25 MMOL/L (ref 21–32)
CREAT SERPL-MCNC: 0.6 MG/DL (ref 0.6–1.3)
CROSSMATCH: NORMAL
CROSSMATCH: NORMAL
EOSINOPHIL # BLD AUTO: 0.05 THOUSAND/ΜL (ref 0–0.61)
EOSINOPHIL NFR BLD AUTO: 1 % (ref 0–6)
ERYTHROCYTE [DISTWIDTH] IN BLOOD BY AUTOMATED COUNT: 12.1 % (ref 11.6–15.1)
GFR SERPL CREATININE-BSD FRML MDRD: 102 ML/MIN/1.73SQ M
GLUCOSE SERPL-MCNC: 166 MG/DL (ref 65–140)
HCT VFR BLD AUTO: 28.3 % (ref 34.8–46.1)
HGB BLD-MCNC: 9.3 G/DL (ref 11.5–15.4)
IMM GRANULOCYTES # BLD AUTO: 0.05 THOUSAND/UL (ref 0–0.2)
IMM GRANULOCYTES NFR BLD AUTO: 1 % (ref 0–2)
LYMPHOCYTES # BLD AUTO: 1.01 THOUSANDS/ΜL (ref 0.6–4.47)
LYMPHOCYTES NFR BLD AUTO: 10 % (ref 14–44)
MAGNESIUM SERPL-MCNC: 1.7 MG/DL (ref 1.6–2.6)
MCH RBC QN AUTO: 31 PG (ref 26.8–34.3)
MCHC RBC AUTO-ENTMCNC: 32.9 G/DL (ref 31.4–37.4)
MCV RBC AUTO: 94 FL (ref 82–98)
MONOCYTES # BLD AUTO: 0.56 THOUSAND/ΜL (ref 0.17–1.22)
MONOCYTES NFR BLD AUTO: 5 % (ref 4–12)
NEUTROPHILS # BLD AUTO: 8.96 THOUSANDS/ΜL (ref 1.85–7.62)
NEUTS SEG NFR BLD AUTO: 83 % (ref 43–75)
NRBC BLD AUTO-RTO: 0 /100 WBCS
PLATELET # BLD AUTO: 177 THOUSANDS/UL (ref 149–390)
PMV BLD AUTO: 10.4 FL (ref 8.9–12.7)
POTASSIUM SERPL-SCNC: 3.7 MMOL/L (ref 3.5–5.3)
RBC # BLD AUTO: 3 MILLION/UL (ref 3.81–5.12)
SODIUM SERPL-SCNC: 141 MMOL/L (ref 136–145)
UNIT DISPENSE STATUS: NORMAL
UNIT DISPENSE STATUS: NORMAL
UNIT PRODUCT CODE: NORMAL
UNIT PRODUCT CODE: NORMAL
UNIT RH: NORMAL
UNIT RH: NORMAL
WBC # BLD AUTO: 10.67 THOUSAND/UL (ref 4.31–10.16)

## 2019-08-23 PROCEDURE — 97163 PT EVAL HIGH COMPLEX 45 MIN: CPT

## 2019-08-23 PROCEDURE — G8988 SELF CARE GOAL STATUS: HCPCS

## 2019-08-23 PROCEDURE — 80048 BASIC METABOLIC PNL TOTAL CA: CPT | Performed by: SURGERY

## 2019-08-23 PROCEDURE — 97167 OT EVAL HIGH COMPLEX 60 MIN: CPT

## 2019-08-23 PROCEDURE — 99024 POSTOP FOLLOW-UP VISIT: CPT | Performed by: SURGERY

## 2019-08-23 PROCEDURE — G8987 SELF CARE CURRENT STATUS: HCPCS

## 2019-08-23 PROCEDURE — 99232 SBSQ HOSP IP/OBS MODERATE 35: CPT | Performed by: ANESTHESIOLOGY

## 2019-08-23 PROCEDURE — 83735 ASSAY OF MAGNESIUM: CPT | Performed by: SURGERY

## 2019-08-23 PROCEDURE — 85025 COMPLETE CBC W/AUTO DIFF WBC: CPT | Performed by: SURGERY

## 2019-08-23 RX ORDER — ONDANSETRON 2 MG/ML
4 INJECTION INTRAMUSCULAR; INTRAVENOUS EVERY 4 HOURS PRN
Status: DISCONTINUED | OUTPATIENT
Start: 2019-08-23 | End: 2019-08-27 | Stop reason: HOSPADM

## 2019-08-23 RX ORDER — DEXTROSE, SODIUM CHLORIDE, AND POTASSIUM CHLORIDE 5; .45; .15 G/100ML; G/100ML; G/100ML
84 INJECTION INTRAVENOUS CONTINUOUS
Status: DISCONTINUED | OUTPATIENT
Start: 2019-08-23 | End: 2019-08-25

## 2019-08-23 RX ORDER — DEXTROSE, SODIUM CHLORIDE, AND POTASSIUM CHLORIDE 5; .45; .15 G/100ML; G/100ML; G/100ML
125 INJECTION INTRAVENOUS CONTINUOUS
Status: DISCONTINUED | OUTPATIENT
Start: 2019-08-23 | End: 2019-08-23

## 2019-08-23 RX ORDER — DIPHENHYDRAMINE HYDROCHLORIDE 50 MG/ML
25 INJECTION INTRAMUSCULAR; INTRAVENOUS EVERY 6 HOURS PRN
Status: DISCONTINUED | OUTPATIENT
Start: 2019-08-23 | End: 2019-08-24 | Stop reason: DRUGHIGH

## 2019-08-23 RX ORDER — NALBUPHINE HCL 10 MG/ML
10 AMPUL (ML) INJECTION
Status: DISCONTINUED | OUTPATIENT
Start: 2019-08-23 | End: 2019-08-27 | Stop reason: HOSPADM

## 2019-08-23 RX ORDER — CALCIUM CARBONATE 200(500)MG
500 TABLET,CHEWABLE ORAL 3 TIMES DAILY PRN
Status: DISCONTINUED | OUTPATIENT
Start: 2019-08-23 | End: 2019-08-27 | Stop reason: HOSPADM

## 2019-08-23 RX ORDER — POTASSIUM CHLORIDE 20 MEQ/1
20 TABLET, EXTENDED RELEASE ORAL ONCE
Status: COMPLETED | OUTPATIENT
Start: 2019-08-23 | End: 2019-08-23

## 2019-08-23 RX ORDER — METOCLOPRAMIDE HYDROCHLORIDE 5 MG/ML
5 INJECTION INTRAMUSCULAR; INTRAVENOUS EVERY 6 HOURS PRN
Status: DISCONTINUED | OUTPATIENT
Start: 2019-08-23 | End: 2019-08-27 | Stop reason: HOSPADM

## 2019-08-23 RX ORDER — MAGNESIUM SULFATE 1 G/100ML
1 INJECTION INTRAVENOUS ONCE
Status: COMPLETED | OUTPATIENT
Start: 2019-08-23 | End: 2019-08-23

## 2019-08-23 RX ADMIN — ROPIVACAINE HYDROCHLORIDE: 2 INJECTION, SOLUTION EPIDURAL; INFILTRATION at 19:11

## 2019-08-23 RX ADMIN — HYDROMORPHONE HYDROCHLORIDE 0.5 MG: 1 INJECTION, SOLUTION INTRAMUSCULAR; INTRAVENOUS; SUBCUTANEOUS at 03:45

## 2019-08-23 RX ADMIN — ROPIVACAINE HYDROCHLORIDE: 5 INJECTION, SOLUTION EPIDURAL; INFILTRATION; PERINEURAL at 07:42

## 2019-08-23 RX ADMIN — DEXTROSE, SODIUM CHLORIDE, AND POTASSIUM CHLORIDE 125 ML/HR: 5; .45; .15 INJECTION INTRAVENOUS at 06:02

## 2019-08-23 RX ADMIN — CALCIUM CARBONATE (ANTACID) CHEW TAB 500 MG 500 MG: 500 CHEW TAB at 22:00

## 2019-08-23 RX ADMIN — POTASSIUM CHLORIDE 20 MEQ: 1500 TABLET, EXTENDED RELEASE ORAL at 08:35

## 2019-08-23 RX ADMIN — HEPARIN SODIUM 5000 UNITS: 5000 INJECTION INTRAVENOUS; SUBCUTANEOUS at 22:00

## 2019-08-23 RX ADMIN — ONDANSETRON 4 MG: 2 INJECTION INTRAMUSCULAR; INTRAVENOUS at 15:37

## 2019-08-23 RX ADMIN — HEPARIN SODIUM 5000 UNITS: 5000 INJECTION INTRAVENOUS; SUBCUTANEOUS at 13:21

## 2019-08-23 RX ADMIN — ROPIVACAINE HYDROCHLORIDE: 2 INJECTION, SOLUTION EPIDURAL; INFILTRATION at 11:57

## 2019-08-23 RX ADMIN — CITALOPRAM HYDROBROMIDE 20 MG: 20 TABLET, FILM COATED ORAL at 22:00

## 2019-08-23 RX ADMIN — HEPARIN SODIUM 5000 UNITS: 5000 INJECTION INTRAVENOUS; SUBCUTANEOUS at 05:56

## 2019-08-23 RX ADMIN — NALBUPHINE HYDROCHLORIDE 10 MG: 10 INJECTION, SOLUTION INTRAMUSCULAR; INTRAVENOUS; SUBCUTANEOUS at 11:26

## 2019-08-23 RX ADMIN — DEXTROSE, SODIUM CHLORIDE, AND POTASSIUM CHLORIDE 84 ML/HR: 5; .45; .15 INJECTION INTRAVENOUS at 08:35

## 2019-08-23 RX ADMIN — SODIUM CHLORIDE, SODIUM LACTATE, POTASSIUM CHLORIDE, AND CALCIUM CHLORIDE 1000 ML: .6; .31; .03; .02 INJECTION, SOLUTION INTRAVENOUS at 20:00

## 2019-08-23 RX ADMIN — MAGNESIUM SULFATE HEPTAHYDRATE 1 G: 1 INJECTION, SOLUTION INTRAVENOUS at 09:20

## 2019-08-23 NOTE — CONSULTS
Anesthesia Pain Consult complete  Please see note by Dr Wan [place] for full details of encounter      JAILENE Estrada  Acute Pain Service

## 2019-08-23 NOTE — PLAN OF CARE
Problem: OCCUPATIONAL THERAPY ADULT  Goal: Performs self-care activities at highest level of function for planned discharge setting  See evaluation for individualized goals  Description  Treatment Interventions: ADL retraining, Functional transfer training, UE strengthening/ROM, Endurance training, Patient/family training, Compensatory technique education, Equipment evaluation/education, Activityengagement, Energy conservation          See flowsheet documentation for full assessment, interventions and recommendations  Note:   Limitation: Decreased ADL status, Decreased endurance, Decreased self-care trans, Decreased high-level ADLs     Assessment: Pt is a 63 y o  female seen for OT evaluation s/p admit to [facility] on 8/22/2019 w/ Carcinoid tumor of ileum  She underwent exp  lap, R hemicolectomy, cholecystectomy on 8/22, and has now been cleared for OOB  She continues to have multiple lines as well as PCA pump/epidural    Comorbidities affecting pt's functional performance at time of assessment include: chronic back pain, depression, mets to liver, scoliosis    Personal factors affecting pt at time of IE include:difficulty performing ADLS and difficulty performing IADLS   Prior to admission, pt was independent w all self care, mobiltiy as well as working full time as a CNA  Upon evaluation: Pt requires generally min assist w self care/mobility 2* the following deficits impacting occupational performance: decreased strength, decreased tolerance and post op fatigue and multiple lines  Pt to benefit from continued skilled OT tx while in the hospital to address deficits as defined above and maximize level of functional independence w ADL's and functional mobility  Occupational Performance areas to address include: self care, mobility, energy conservation technqiues  Pt scored  55 /100 on the barthel index   Based on findings from OT evaluation and functional performance deficits, pt has been identified as a High  complexity evaluation  From OT standpoint, recommendation at time of d/c would be home w family support as patient is expected to demonstrate good progress while in acute care  OT Discharge Recommendation: Home with family support  OT - OK to Discharge:  Yes

## 2019-08-23 NOTE — UTILIZATION REVIEW
Initial Clinical Review    Elective Inpatient surgical procedure  Age/Sex: 63 y o  female  Surgery Date: 8/22/19  Procedure: ABLATION MICROWAVE; INTRAOPERATIVE ULTRASOUND OF THE LIVER (N/A)  HEMICOLECTOMY (Right)  CHOLECYSTECTOMY   LAPAROTOMY EXPLORATORY   Anesthesia: General w/ Epidural  Operative Findings: Abdominal disease appeared to be confined to the primary site in the terminal ileum and a mesenteric node  The liver disease appeared as 4 discrete nodules seen on endoscopic ultrasound  There were some vague hazy area seen on the ultrasound of the liver, but there were no discrete masses seen in these regions  Admission Orders: Date/Time/Statement: Inpatient Admission Orders (From admission, onward)     Ordered        08/22/19 1524  Inpatient Admission  Once                   Orders Placed This Encounter   Procedures    Inpatient Admission     Standing Status:   Standing     Number of Occurrences:   1     Order Specific Question:   Admitting Physician     Answer:   Ceci Contreras     Order Specific Question:   Level of Care     Answer:   Med Surg [16]     Order Specific Question:   Estimated length of stay     Answer:   More than 2 Midnights     Order Specific Question:   Certification     Answer:   I certify that inpatient services are medically necessary for this patient for a duration of greater than two midnights  See H&P and MD Progress Notes for additional information about the patient's course of treatment       Vital Signs: /65   Pulse 78   Temp 100 2 °F (37 9 °C)   Resp 18   Ht 5' 6" (1 676 m)   Wt 73 kg (161 lb)   SpO2 97%   BMI 25 99 kg/m²   Diet: surgical diet, clear liquid, no carbonation  Mobility: ambulatory  DVT Prophylaxis: scd  Medications/Pain Control:   Current Facility-Administered Medications:  citalopram 20 mg Oral Daily   dextrose 5 % and sodium chloride 0 45 % with KCl 20 mEq/L 84 mL/hr Intravenous Continuous   heparin (porcine) 5,000 Units Subcutaneous [facility] 62 HYDROmorphone 0 5 mg Intravenous Q2H PRN   lactated ringers 1,000 mL Intravenous Once PRN   And      lactated ringers 1,000 mL Intravenous Once PRN   magnesium sulfate 1 g Intravenous Once   ondansetron 4 mg Intravenous Q6H PRN   ropivacaine 0 1% and fentaNYL 2 mcg/mL  Epidural Continuous   sodium chloride 1,000 mL Intravenous Once PRN   And      sodium chloride 1,000 mL Intravenous Once PRN       Network Utilization Review Department  Phone: [phone]; Fax [phone]  [email or web address]  ATTENTION: Please call with any questions or concerns to [phone]  and carefully listen to the prompts so that you are directed to the right person  Send all requests for admission clinical reviews, approved or denied determinations and any other requests to fax [phone]   All voicemails are confidential

## 2019-08-23 NOTE — PLAN OF CARE
Problem: PAIN - ADULT  Goal: Verbalizes/displays adequate comfort level or baseline comfort level  Description  Interventions:  - Encourage patient to monitor pain and request assistance  - Assess pain using appropriate pain scale  - Administer analgesics based on type and severity of pain and evaluate response  - Implement non-pharmacological measures as appropriate and evaluate response  - Consider cultural and social influences on pain and pain management  - Notify physician/advanced practitioner if interventions unsuccessful or patient reports new pain  Outcome: Progressing     Problem: INFECTION - ADULT  Goal: Absence or prevention of progression during hospitalization  Description  INTERVENTIONS:  - Assess and monitor for signs and symptoms of infection  - Monitor lab/diagnostic results  - Monitor all insertion sites, i e  indwelling lines, tubes, and drains  - Monitor endotracheal if appropriate and nasal secretions for changes in amount and color  - [place] appropriate cooling/warming therapies per order  - Administer medications as ordered  - Instruct and encourage patient and family to use good hand hygiene technique  - Identify and instruct in appropriate isolation precautions for identified infection/condition  Outcome: Progressing  Goal: Absence of fever/infection during neutropenic period  Description  INTERVENTIONS:  - Monitor WBC    Outcome: Progressing     Problem: SAFETY ADULT  Goal: Patient will remain free of falls  Description  INTERVENTIONS:  - Assess patient frequently for physical needs  -  Identify cognitive and physical deficits and behaviors that affect risk of falls    -  [place] fall precautions as indicated by assessment   - Educate patient/family on patient safety including physical limitations  - Instruct patient to call for assistance with activity based on assessment  - Modify environment to reduce risk of injury  - Consider OT/PT consult to assist with strengthening/mobility  Outcome: Progressing  Goal: Maintain or return to baseline ADL function  Description  INTERVENTIONS:  -  Assess patient's ability to carry out ADLs; assess patient's baseline for ADL function and identify physical deficits which impact ability to perform ADLs (bathing, care of mouth/teeth, toileting, grooming, dressing, etc )  - Assess/evaluate cause of self-care deficits   - Assess range of motion  - Assess patient's mobility; develop plan if impaired  - Assess patient's need for assistive devices and provide as appropriate  - Encourage maximum independence but intervene and supervise when necessary  - Involve family in performance of ADLs  - Assess for home care needs following discharge   - Consider OT consult to assist with ADL evaluation and planning for discharge  - Provide patient education as appropriate  Outcome: Progressing  Goal: Maintain or return mobility status to optimal level  Description  INTERVENTIONS:  - Assess patient's baseline mobility status (ambulation, transfers, stairs, etc )    - Identify cognitive and physical deficits and behaviors that affect mobility  - Identify mobility aids required to assist with transfers and/or ambulation (gait belt, sit-to-stand, lift, walker, cane, etc )  - [place] fall precautions as indicated by assessment  - Record patient progress and toleration of activity level on Mobility SBAR; progress patient to next Phase/Stage  - Instruct patient to call for assistance with activity based on assessment  - Consider rehabilitation consult to assist with strengthening/weightbearing, etc   Outcome: Progressing     Problem: Prexisting or High Potential for Compromised Skin Integrity  Goal: Skin integrity is maintained or improved  Description  INTERVENTIONS:  - Identify patients at risk for skin breakdown  - Assess and monitor skin integrity  - Assess and monitor nutrition and hydration status  - Monitor labs   - Assess for incontinence   - Turn and reposition patient  - Assist with mobility/ambulation  - Relieve pressure over bony prominences  - Avoid friction and shearing  - Provide appropriate hygiene as needed including keeping skin clean and dry  - Evaluate need for skin moisturizer/barrier cream  - Collaborate with interdisciplinary team   - Patient/family teaching  - Consider wound care consult   Outcome: Progressing     Problem: Potential for Falls  Goal: Patient will remain free of falls  Description  INTERVENTIONS:  - Assess patient frequently for physical needs  -  Identify cognitive and physical deficits and behaviors that affect risk of falls    -  [place] fall precautions as indicated by assessment   - Educate patient/family on patient safety including physical limitations  - Instruct patient to call for assistance with activity based on assessment  - Modify environment to reduce risk of injury  - Consider OT/PT consult to assist with strengthening/mobility  Outcome: Progressing

## 2019-08-23 NOTE — ASSESSMENT & PLAN NOTE
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

## 2019-08-23 NOTE — PROGRESS NOTES
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]    Assessment:   63 y o  female STATUS POST   Procedure(s):  ABLATION MICROWAVE; INTRAOPERATIVE ULTRASOUND OF THE LIVER  HEMICOLECTOMY  CHOLECYSTECTOMY  LAPAROTOMY EXPLORATORY  POD# 1    Plan:   Patient has a moderate amount of pain in her upper abdomen, she states pain is better inferiorly and worsens higher up  She has a significant amount of pruritis this morning and asked for something to help  1) Pruritis may be secondary to the fentanyl in her PCEA infusion, switch infusion to 0 2% ropivacaine at 10/5/10/4   Higher rate should help cover pain higher up in her abdomen  2) Start nubain 10mg q3h PRN for pruritis    Please call  ( [phone]) with any questions    Subjective:  Current pain location(s): Abdomen  Pain Scale:   5-6/10    Meds/Allergies     Allergies   Allergen Reactions    Prednisone Other (See Comments) and Confusion     Psychosis        Objective     Temp:  [97 5 °F (36 4 °C)-100 2 °F (37 9 °C)] 99 5 °F (37 5 °C)  HR:  [64-78] 73  Resp:  [12-20] 18  BP: ()/(53-67) 99/58  Arterial Line BP: (102-130)/(54-66) 130/66    Physical Exam:  Gen: Well appearing, NAD  CV: RRR  Pulm: CTAB  Neuro: No focal deficits  Epidural Site: Catheter in good position, not tender to palpation, site clean    SIGNATURE: Casandra Acevedo MD  DATE: August 23, 2019  TIME: 11:28 AM

## 2019-08-23 NOTE — PROGRESS NOTES
Called [facility] regarding pt UO  Bladder scanned for 89mL  No new orders at this time  Will continue to monitor

## 2019-08-23 NOTE — PHYSICAL THERAPY NOTE
Physical Therapy Evaluation Note     Patient Name: Ronald Hernandez    CCLNR'W Date: 8/23/2019     Problem List  Patient Active Problem List   Diagnosis    Chronic bilateral low back pain    Colon cancer screening    Depression    Scoliosis of thoracic spine    Right leg pain    Screening for colon cancer    Carcinoid tumor of ileum    Diarrhea    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 ([address] )    Other long term (current) drug therapy        Past Medical History  Past Medical History:   Diagnosis Date    Cancer [facility])     neuroendocrine tumor    Psychiatric disorder     Scoliosis     Strep throat     Weight gain         Past Surgical History  Past Surgical History:   Procedure Laterality Date    BREAST BIOPSY      BREAST LUMPECTOMY      benign    CHOLECYSTECTOMY N/A 8/22/2019    Procedure: CHOLECYSTECTOMY;  Surgeon: Vera Mackey MD;  [facility];  Service: Surgical Oncology    COLECTOMY TOTAL Right 8/22/2019    Procedure: HEMICOLECTOMY;  Surgeon: Vera Mackey MD;  [facility];  Service: Surgical Oncology    HYSTERECTOMY      LAPAROTOMY N/A 8/22/2019    Procedure: LAPAROTOMY EXPLORATORY;  Surgeon: Vera Mackey MD;  [facility];  Service: Surgical Oncology    PARTIAL HYSTERECTOMY      HI ABLTJ 1/> LVR BRYNN PRQ RF N/A 8/22/2019    Procedure: ABLATION MICROWAVE; INTRAOPERATIVE ULTRASOUND OF THE LIVER;  Surgeon: Vera Mackey MD;  [facility];  Service: Surgical Oncology    TUBAL LIGATION             08/23/19 1032   Note Type   Note type Eval only   Pain Assessment   Pain Assessment 0-10   Pain Score 6   Pain Type Acute pain   Pain Location Abdomen   Home Living   Type of [address] One level   Additional Comments Pt lives at home with her  who has the option of working from home  Pt was employed as a restorative PCA prior to admission  Pt was I for ADL's and mobility   Pt has 1 MAYKEL home  Pt reports that she has family that can assist is needed  Pt lives in a one story Home  Prior Function   Level of [place] Independent with ADLs and functional mobility   Lives With Spouse   Receives Help From Family   ADL Assistance Independent   IADLs Independent   Falls in the last 6 months 0   Vocational Full time employment   Restrictions/Precautions   Weight Bearing Precautions Per Order No   Other Precautions Fall Risk;Pain;Multiple lines   General   Family/Caregiver Present Yes   Cognition   Overall Cognitive Status WFL   Arousal/Participation Alert   Orientation Level Oriented X4   Memory Within functional limits   Following Commands Follows all commands and directions without difficulty   RLE Assessment   RLE Assessment WFL   LLE Assessment   LLE Assessment WFL   Coordination   Movements are Fluid and Coordinated 0   Sensation WFL   Light Touch   RLE Light Touch Grossly intact   LLE Light Touch Grossly intact   Bed Mobility   Rolling L 4  Minimal assistance   Additional items Assist x 1   Transfers   Sit to Stand 4  Minimal assistance   Additional items Assist x 1   Stand to Sit 4  Minimal assistance   Additional items Assist x 1   Ambulation/Elevation   Gait pattern Shuffling; Antalgic; Foward flexed   Gait Assistance 4  Minimal assist   Additional items Assist x 1   Assistive Device Rolling walker   Distance 2ft to chair    Balance   Static Sitting Fair +   Dynamic Sitting Fair +   Static Standing Fair   Dynamic Standing Fair   Ambulatory Fair   Endurance Deficit   Endurance Deficit Yes   Activity Tolerance   Activity Tolerance Patient limited by pain; Patient limited by fatigue   Medical Staff Made Aware OT present during sesion   Nurse Made Aware nurse approved therapy session   Assessment   Prognosis Good   Problem List Decreased strength;Decreased endurance; Impaired balance;Decreased mobility;Pain   Assessment Pt is a 62 yo female admitted to [place] [address] on 8/22/2019 s/p surgery on 8/22/2019 for ablation microwave, intraoperative ultrasound of liver, hemicolectomy, cholecystectomy, laparotomy exploratory Dx: carcinoid tumor of ileum  Two patient identifiers were used to confirm  Pt lives at home in a 1 story home with 1 MAYKEL  Pt's  can be home during the day upon d/c  Pt also has support from family  Pt was I for ADL's and mobility prior to admission  Pt did not use an AD  Pt worked full time as a restorative PCA  Pt's impairments include increased B LE strength, increased pain in abdomen, decreased mobility due to pain  These impairments limit the ability of the patient to perform mobility without increased assistance, return to PLOF and participate in everyday life activities  Pt would benefit from continued skilled therapy while in the hospital to improve overall mobility and facilitate a return to PLOF  Pt is mainly limited by pain at this point and with less pain more mobility with less assistance is anticipated  Recommend discharge to home with Pt vs rehab pending progress made  At the end of the session the patient was left in seated position with call bell and phone within reach  Pt's  was present for the end of the session  Goals   STG Expiration Date 09/02/19   Short Term Goal #1 STG 1: Pt will perform transfers at a I/MI level to return to baseline function  STG 2: Pt will ambulate 150ft with least restrictive device at a I/MI level to decrease level of assist needed upon d/c  STG 3: Pt will perform bed mobility at a I level to encourage increased participation in therapy  Treatment Day 0   Plan   Treatment/Interventions Functional transfer training;LE strengthening/ROM; Elevations; Therapeutic exercise; Endurance training;Gait training;Bed mobility   PT Frequency   (4-6x wk)   Recommendation   Recommendation   (home with PT vs rehab pending progress made )   Equipment Recommended Walker   PT - OK to Discharge No   Additional Comments pending stair negotiation and increased amb   [place] Scale   Modified [place] Scale 4   Barthel Index   Feeding 10   Bathing 5   Grooming Score 0   Dressing Score 5   Bladder Score 10   Bowels Score 10   Toilet Use Score 5   Transfers (Bed/Chair) Score 10   Mobility (Level Surface) Score 0   Stairs Score 0   Barthel Index Score 55     Dejah Hogan, Pt, DPT

## 2019-08-23 NOTE — PROGRESS NOTES
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 now s/p Exlap R hemicolectomy, cholecystectomy w/ liver ablation 8/22    Doing well, with some abdominal pain  Pain well controlled with Epidural after a PRN from [facility]  Plan:  Clear Liquid Diet  Yeager out  Keep Epidural  Switch to D5 1/2NS 20K  PT/OT                  Subjective/Objective   Chief Complaint:     Subjective: patient had some increased pain which resolved with PRN via [facility]  No bowel function yet  Doing well post operatively    Objective:     Blood pressure 115/64, pulse 72, temperature 98 8 °F (37 1 °C), resp  rate 18, height 5' 6" (1 676 m), weight 73 kg (161 lb), SpO2 98 %, not currently breastfeeding  ,Body mass index is 25 99 kg/m²  Intake/Output Summary (Last 24 hours) at 8/23/2019 0747  Last data filed at 8/23/2019 0826  Gross per 24 hour   Intake 6380 57 ml   Output 2500 ml   Net 3880 57 ml       Invasive Devices     Peripheral Intravenous Line            Peripheral IV 08/22/19 Left Wrist less than 1 day    Peripheral IV 08/22/19 Right Forearm less than 1 day          Epidural Line            Epidural Catheter 08/22/19 less than 1 day          Drain            Urethral Catheter Double-lumen 16 Fr  less than 1 day                Physical Exam: General: AAOx3  Head: normocephalic, atraumatic  Neck: supple, trachea midline  Respiratory: BS b/l  Abdomen: Soft, appropriately tender, no rebound/guarding  Incision c/d/i  Heart: RRR, S1s2  Ext: Warm no cyanosis   Pulse: 2+ radial      Lab, Imaging and other studies:  I have personally reviewed pertinent lab results    , CBC:   Lab Results   Component Value Date    WBC 10 67 (H) 08/23/2019    HGB 9 3 (L) 08/23/2019    HCT 28 3 (L) 08/23/2019    MCV 94 08/23/2019     08/23/2019    MCH 31 0 08/23/2019    MCHC 32 9 08/23/2019    RDW 12 1 08/23/2019    MPV 10 4 08/23/2019    NRBC 0 08/23/2019   , CMP:   Lab Results   Component Value Date    SODIUM 146 (H) 08/22/2019    K 3 6 08/22/2019     (H) 08/22/2019    CO2 22 08/22/2019    CO2 24 08/22/2019    BUN 6 08/22/2019    CREATININE 0 59 (L) 08/22/2019    GLUCOSE 120 08/22/2019    CALCIUM 7 7 (L) 08/22/2019    EGFR 102 08/22/2019     VTE Pharmacologic Prophylaxis: Heparin  VTE Mechanical Prophylaxis: sequential compression device

## 2019-08-23 NOTE — PLAN OF CARE
Problem: PHYSICAL THERAPY ADULT  Goal: Performs mobility at highest level of function for planned discharge setting  See evaluation for individualized goals  Description    Note:   Prognosis: Good  Problem List: Decreased strength, Decreased endurance, Impaired balance, Decreased mobility, Pain  Assessment: Pt is a 64 yo female admitted to [place] [address] on 8/22/2019 s/p surgery on 8/22/2019 for ablation microwave, intraoperative ultrasound of liver, hemicolectomy, cholecystectomy, laparotomy exploratory Dx: carcinoid tumor of ileum  Two patient identifiers were used to confirm  Pt lives at home in a 1 story home with 1 MAYKEL  Pt's  can be home during the day upon d/c  Pt also has support from family  Pt was I for ADL's and mobility prior to admission  Pt did not use an AD  Pt worked full time as a restorative PCA  Pt's impairments include increased B LE strength, increased pain in abdomen, decreased mobility due to pain  These impairments limit the ability of the patient to perform mobility without increased assistance, return to PLOF and participate in everyday life activities  Pt would benefit from continued skilled therapy while in the hospital to improve overall mobility and facilitate a return to PLOF  Pt is mainly limited by pain at this point and with less pain more mobility with less assistance is anticipated  Recommend discharge to home with Pt vs rehab pending progress made  At the end of the session the patient was left in seated position with call bell and phone within reach  Pt's  was present for the end of the session  Recommendation: (home with PT vs rehab pending progress made )     PT - OK to Discharge: No    See flowsheet documentation for full assessment

## 2019-08-23 NOTE — SOCIAL WORK
CM met with Pt with an introduction and explanation of role  Pt reported residing with her spouse in a ranch style home with no use of DME and is independent with ADLs  Pt reported being independent with ADLs, is employed with no hx of VNA, SNF, mental health or drug/alcohol placements  Pt denied having a living will, reported the use of [facility] pharmacy in [place] and has Gwenlyn Schwab as a PCP  Family to transport upon d/c     CM reviewed d/c planning process including the following: identifying help at home, patient preference for d/c planning needs, Discharge Lounge, [facility] Meds to Bed program, availability of treatment team to discuss questions or concerns patient and/or family may have regarding understanding medications and recognizing signs and symptoms once discharged  CM also encouraged patient to follow up with all recommended appointments after discharge  Patient advised of importance for patient and family to participate in managing patients medical well being

## 2019-08-23 NOTE — PLAN OF CARE
Problem: PAIN - ADULT  Goal: Verbalizes/displays adequate comfort level or baseline comfort level  Description  Interventions:  - Encourage patient to monitor pain and request assistance  - Assess pain using appropriate pain scale  - Administer analgesics based on type and severity of pain and evaluate response  - Implement non-pharmacological measures as appropriate and evaluate response  - Consider cultural and social influences on pain and pain management  - Notify physician/advanced practitioner if interventions unsuccessful or patient reports new pain  Outcome: Progressing     Problem: INFECTION - ADULT  Goal: Absence or prevention of progression during hospitalization  Description  INTERVENTIONS:  - Assess and monitor for signs and symptoms of infection  - Monitor lab/diagnostic results  - Monitor all insertion sites, i e  indwelling lines, tubes, and drains  - Monitor endotracheal if appropriate and nasal secretions for changes in amount and color  - [place] appropriate cooling/warming therapies per order  - Administer medications as ordered  - Instruct and encourage patient and family to use good hand hygiene technique  - Identify and instruct in appropriate isolation precautions for identified infection/condition  Outcome: Progressing  Goal: Absence of fever/infection during neutropenic period  Description  INTERVENTIONS:  - Monitor WBC    Outcome: Progressing     Problem: SAFETY ADULT  Goal: Patient will remain free of falls  Description  INTERVENTIONS:  - Assess patient frequently for physical needs  -  Identify cognitive and physical deficits and behaviors that affect risk of falls    -  [place] fall precautions as indicated by assessment   - Educate patient/family on patient safety including physical limitations  - Instruct patient to call for assistance with activity based on assessment  - Modify environment to reduce risk of injury  - Consider OT/PT consult to assist with strengthening/mobility  Outcome: Progressing  Goal: Maintain or return to baseline ADL function  Description  INTERVENTIONS:  -  Assess patient's ability to carry out ADLs; assess patient's baseline for ADL function and identify physical deficits which impact ability to perform ADLs (bathing, care of mouth/teeth, toileting, grooming, dressing, etc )  - Assess/evaluate cause of self-care deficits   - Assess range of motion  - Assess patient's mobility; develop plan if impaired  - Assess patient's need for assistive devices and provide as appropriate  - Encourage maximum independence but intervene and supervise when necessary  - Involve family in performance of ADLs  - Assess for home care needs following discharge   - Consider OT consult to assist with ADL evaluation and planning for discharge  - Provide patient education as appropriate  Outcome: Progressing  Goal: Maintain or return mobility status to optimal level  Description  INTERVENTIONS:  - Assess patient's baseline mobility status (ambulation, transfers, stairs, etc )    - Identify cognitive and physical deficits and behaviors that affect mobility  - Identify mobility aids required to assist with transfers and/or ambulation (gait belt, sit-to-stand, lift, walker, cane, etc )  - [place] fall precautions as indicated by assessment  - Record patient progress and toleration of activity level on Mobility SBAR; progress patient to next Phase/Stage  - Instruct patient to call for assistance with activity based on assessment  - Consider rehabilitation consult to assist with strengthening/weightbearing, etc   Outcome: Progressing

## 2019-08-23 NOTE — OCCUPATIONAL THERAPY NOTE
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 ([address] )    Other long term (current) drug therapy       Past Medical History:   Diagnosis Date    Cancer [facility])     neuroendocrine tumor    Psychiatric disorder     Scoliosis     Strep throat     Weight gain        Past Surgical History:   Procedure Laterality Date    BREAST BIOPSY      BREAST LUMPECTOMY      benign    CHOLECYSTECTOMY N/A 8/22/2019    Procedure: CHOLECYSTECTOMY;  Surgeon: Jeanette Sood MD;  [facility];  Service: Surgical Oncology    COLECTOMY TOTAL Right 8/22/2019    Procedure: HEMICOLECTOMY;  Surgeon: Jeanette Sood MD;  [facility];  Service: Surgical Oncology    HYSTERECTOMY      LAPAROTOMY N/A 8/22/2019    Procedure: LAPAROTOMY EXPLORATORY;  Surgeon: Jeanette Sood MD;  [facility];  Service: Surgical Oncology    PARTIAL HYSTERECTOMY      RI ABLTJ 1/> LVR BRYNN PRQ RF N/A 8/22/2019    Procedure: ABLATION MICROWAVE; INTRAOPERATIVE ULTRASOUND OF THE LIVER;  Surgeon: Jeanette Sood MD;  [facility];  Service: Surgical Oncology    TUBAL LIGATION          08/23/19 1030   Note Type   Note type Eval only   Restrictions/Precautions   Weight Bearing Precautions Per Order No   Other Precautions Multiple lines; Fall Risk   Pain Assessment   Pain Assessment 0-10   Pain Score 6   Pain Type Surgical pain   Pain Location Abdomen   Home Living   Type of [address] One level   Bathroom Shower/Tub Tub/shower unit   Bathroom Toilet Standard   Additional Comments no use of DME at home   Prior Function   Level of [place] Independent with ADLs and functional mobility   Lives With [place] Help From Family  (states parents can stay w her as needed)   ADL Assistance Independent   IADLs Independent   Falls in the last 6 months 0   Vocational Full time employment   Comments works full time as a restorative CNA at [employer] fully independent w self care, home management, driving as well as working full time   Reciprocal Relationships supportive family  (2 adult kids, spouse can work from home  )   [address] go on motorcycle rides   Psychosocial   Psychosocial (WDL) WDL   Subjective   Subjective "Right now I feel very nauseus, but lets do this"   ADL   Eating Assistance Other (Comment)   Eating Deficit   (can self feed, currently liquid only)   Grooming Assistance 4  Minimal Assistance   [address] 4  Minimal Assistance   [address] 4  Minimal Assistance    [address] 4  Minimal Assistance    [address] 3  Moderate Assistance   Additional Comments min/mod assist needed for completion of self care due to post op pain, multiple lines   Bed Mobility   Rolling L 4  Minimal assistance   Additional items Assist x 1; Increased time required   Transfers   Sit to Stand 4  Minimal assistance   Additional items Assist x 1   Stand to Sit 4  Minimal assistance   Additional items Assist x 1   Stand pivot 4  Minimal assistance  (CGA/SBA)   Additional items Assist x 1   Functional Mobility   Functional Mobility 4  Minimal assistance   Additional items Rolling walker   Balance   Static Sitting Fair +   Dynamic Sitting Fair +   Static Standing Fair   Dynamic Standing Fair   Activity Tolerance   Activity Tolerance Patient limited by fatigue;Patient limited by pain   Medical Staff Made Aware yes   Nurse Made Aware appropriate to see   RUE Assessment   RUE Assessment WFL   LUE Assessment   LUE Assessment WFL   Hand Function   Gross Motor Coordination Functional   Fine Motor Coordination Functional   Sensation   Light Touch No apparent deficits   Vision - Complex Assessment   Tracking Able to track stimulus in all quads without difficulty   Perception   Inattention/Neglect Appears intact   Cognition   Overall Cognitive Status [facility]   Arousal/Participation Alert; Cooperative   Attention Within functional limits   Orientation Level Oriented X4   Memory Within functional limits   Following Commands Follows all commands and directions without difficulty   Assessment   Limitation Decreased ADL status; Decreased endurance;Decreased self-care trans;Decreased high-level ADLs   Assessment Pt is a 63 y o  female seen for OT evaluation s/p admit to [facility] on 8/22/2019 w/ Carcinoid tumor of ileum  She underwent exp  lap, R hemicolectomy, cholecystectomy on 8/22, and has now been cleared for OOB  She continues to have multiple lines as well as PCA pump/epidural    Comorbidities affecting pt's functional performance at time of assessment include: chronic back pain, depression, mets to liver, scoliosis    Personal factors affecting pt at time of IE include:difficulty performing ADLS and difficulty performing IADLS   Prior to admission, pt was independent w all self care, mobiltiy as well as working full time as a CNA  Upon evaluation: Pt requires generally min assist w self care/mobility 2* the following deficits impacting occupational performance: decreased strength, decreased tolerance and post op fatigue and multiple lines  Pt to benefit from continued skilled OT tx while in the hospital to address deficits as defined above and maximize level of functional independence w ADL's and functional mobility  Occupational Performance areas to address include: self care, mobility, energy conservation technqiues  Pt scored  55 /100 on the barthel index  Based on findings from OT evaluation and functional performance deficits, pt has been identified as a High  complexity evaluation   From OT standpoint, recommendation at time of d/c would be home w family support as patient is expected to demonstrate good progress while in acute care  Goals   Patient Goals to get better and go home   STG Time Frame 3-5   Short Term Goal #1 pt will complete bedmobility mod I    Short Term Goal #2 tolerate sitting at EOB, unsupported x 20 min for completion of simple hygiene   LTG Time Frame 7-10   Long Term Goal #1 tolerate standing x 10 min w good balance for increased safety w completion of hygiene, simulated home management activites   Long Term Goal #2 functional mobility in room and bathroom mod I w good safety   Long Term Goal demonstrate good ECT/self pacing skills w all self care and functional mobility   Functional Transfer Goals   Pt Will Perform All Functional Transfers Independently   ADL Goals   Pt Will Perform All ADL's With mod indep   Plan   Treatment Interventions ADL retraining;Functional transfer training;UE strengthening/ROM; Endurance training;Patient/family training; Compensatory technique education;Equipment evaluation/education; Activityengagement; Energy conservation   Goal Expiration Date 09/02/19   OT Frequency 3-5x/wk   Recommendation   OT Discharge Recommendation Home with family support   OT - OK to Discharge Yes   Barthel Index   Feeding 10   Bathing 0   Grooming Score 5   Dressing Score 5   Bladder Score 10   Bowels Score 10   Toilet Use Score 5   Transfers (Bed/Chair) Score 10   Mobility (Level Surface) Score 0   Stairs Score 0   Barthel Index Score 55

## 2019-08-24 LAB
ANION GAP SERPL CALCULATED.3IONS-SCNC: 6 MMOL/L (ref 4–13)
BASOPHILS # BLD AUTO: 0.03 THOUSANDS/ΜL (ref 0–0.1)
BASOPHILS NFR BLD AUTO: 0 % (ref 0–1)
BUN SERPL-MCNC: 6 MG/DL (ref 5–25)
CALCIUM SERPL-MCNC: 8.8 MG/DL (ref 8.3–10.1)
CHLORIDE SERPL-SCNC: 107 MMOL/L (ref 100–108)
CO2 SERPL-SCNC: 27 MMOL/L (ref 21–32)
CREAT SERPL-MCNC: 0.54 MG/DL (ref 0.6–1.3)
EOSINOPHIL # BLD AUTO: 0.03 THOUSAND/ΜL (ref 0–0.61)
EOSINOPHIL NFR BLD AUTO: 0 % (ref 0–6)
ERYTHROCYTE [DISTWIDTH] IN BLOOD BY AUTOMATED COUNT: 12.3 % (ref 11.6–15.1)
GFR SERPL CREATININE-BSD FRML MDRD: 105 ML/MIN/1.73SQ M
GLUCOSE SERPL-MCNC: 131 MG/DL (ref 65–140)
HCT VFR BLD AUTO: 24.3 % (ref 34.8–46.1)
HGB BLD-MCNC: 7.9 G/DL (ref 11.5–15.4)
IMM GRANULOCYTES # BLD AUTO: 0.06 THOUSAND/UL (ref 0–0.2)
IMM GRANULOCYTES NFR BLD AUTO: 1 % (ref 0–2)
LYMPHOCYTES # BLD AUTO: 0.98 THOUSANDS/ΜL (ref 0.6–4.47)
LYMPHOCYTES NFR BLD AUTO: 8 % (ref 14–44)
MAGNESIUM SERPL-MCNC: 2.1 MG/DL (ref 1.6–2.6)
MCH RBC QN AUTO: 30.9 PG (ref 26.8–34.3)
MCHC RBC AUTO-ENTMCNC: 32.5 G/DL (ref 31.4–37.4)
MCV RBC AUTO: 95 FL (ref 82–98)
MONOCYTES # BLD AUTO: 0.74 THOUSAND/ΜL (ref 0.17–1.22)
MONOCYTES NFR BLD AUTO: 6 % (ref 4–12)
NEUTROPHILS # BLD AUTO: 10.16 THOUSANDS/ΜL (ref 1.85–7.62)
NEUTS SEG NFR BLD AUTO: 85 % (ref 43–75)
NRBC BLD AUTO-RTO: 0 /100 WBCS
PLATELET # BLD AUTO: 148 THOUSANDS/UL (ref 149–390)
PMV BLD AUTO: 11.2 FL (ref 8.9–12.7)
POTASSIUM SERPL-SCNC: 4 MMOL/L (ref 3.5–5.3)
RBC # BLD AUTO: 2.56 MILLION/UL (ref 3.81–5.12)
SODIUM SERPL-SCNC: 140 MMOL/L (ref 136–145)
WBC # BLD AUTO: 12 THOUSAND/UL (ref 4.31–10.16)

## 2019-08-24 PROCEDURE — 85025 COMPLETE CBC W/AUTO DIFF WBC: CPT | Performed by: PHYSICIAN ASSISTANT

## 2019-08-24 PROCEDURE — 80048 BASIC METABOLIC PNL TOTAL CA: CPT | Performed by: PHYSICIAN ASSISTANT

## 2019-08-24 PROCEDURE — 83735 ASSAY OF MAGNESIUM: CPT | Performed by: PHYSICIAN ASSISTANT

## 2019-08-24 PROCEDURE — 99024 POSTOP FOLLOW-UP VISIT: CPT | Performed by: SURGERY

## 2019-08-24 RX ORDER — DIPHENHYDRAMINE HCL 25 MG
25 TABLET ORAL EVERY 6 HOURS PRN
Status: DISCONTINUED | OUTPATIENT
Start: 2019-08-24 | End: 2019-08-27 | Stop reason: HOSPADM

## 2019-08-24 RX ADMIN — DEXTROSE, SODIUM CHLORIDE, AND POTASSIUM CHLORIDE 84 ML/HR: 5; .45; .15 INJECTION INTRAVENOUS at 08:11

## 2019-08-24 RX ADMIN — ROPIVACAINE HYDROCHLORIDE: 2 INJECTION, SOLUTION EPIDURAL; INFILTRATION at 13:40

## 2019-08-24 RX ADMIN — HEPARIN SODIUM 5000 UNITS: 5000 INJECTION INTRAVENOUS; SUBCUTANEOUS at 14:57

## 2019-08-24 RX ADMIN — ROPIVACAINE HYDROCHLORIDE: 2 INJECTION, SOLUTION EPIDURAL; INFILTRATION at 18:55

## 2019-08-24 RX ADMIN — ROPIVACAINE HYDROCHLORIDE: 2 INJECTION, SOLUTION EPIDURAL; INFILTRATION at 08:29

## 2019-08-24 RX ADMIN — BREXPIPRAZOLE 0.5 MG: 0.5 TABLET ORAL at 22:59

## 2019-08-24 RX ADMIN — ROPIVACAINE HYDROCHLORIDE: 2 INJECTION, SOLUTION EPIDURAL; INFILTRATION at 23:01

## 2019-08-24 RX ADMIN — DEXTROSE, SODIUM CHLORIDE, AND POTASSIUM CHLORIDE 84 ML/HR: 5; .45; .15 INJECTION INTRAVENOUS at 20:27

## 2019-08-24 RX ADMIN — CITALOPRAM HYDROBROMIDE 20 MG: 20 TABLET, FILM COATED ORAL at 20:42

## 2019-08-24 RX ADMIN — HEPARIN SODIUM 5000 UNITS: 5000 INJECTION INTRAVENOUS; SUBCUTANEOUS at 07:14

## 2019-08-24 RX ADMIN — HEPARIN SODIUM 5000 UNITS: 5000 INJECTION INTRAVENOUS; SUBCUTANEOUS at 23:00

## 2019-08-24 RX ADMIN — ROPIVACAINE HYDROCHLORIDE: 2 INJECTION, SOLUTION EPIDURAL; INFILTRATION at 02:06

## 2019-08-24 NOTE — PROGRESS NOTES
Bolus finished at 10PM  Pt  Still has not voided  I [facility] and spoke with Flaquito Nash  He said we are to have her walk the reich  If she does not void in the next hour (Midnight) we are to bladder scan her and straight cath her regardless of the amount on the bladder scan  I asked him to enter an order stating such  Will ambulate pt   Now

## 2019-08-24 NOTE — PROGRESS NOTES
Anesthesia Progress Note - Epidural Pain Management    Edson Mei MRN: [id number]  Unit/Bed#: [facility] 917-01 Encounter: [id number]    SURGERY DATE: 8/22/2019  Post-Op Diagnosis Codes:     * Carcinoid tumor of ileum [D3A 012]    Assessment:   63 y o  female STATUS POST   Procedure(s):  ABLATION MICROWAVE; INTRAOPERATIVE ULTRASOUND OF THE LIVER  HEMICOLECTOMY  CHOLECYSTECTOMY  LAPAROTOMY EXPLORATORY  POD# 2    Plan:   Patient is sitting up in her chair this morning without any pain  She states that pain is well controlled however she has to push her demand bolus button frequently to remain comfortable  When she falls asleep and doesn't push her button she wakes up in significant pain, however once she gives herself a few demand boluses she settles out  Her issues with pruritis have resolved since switching the PCEA to ropivacaine 0 2%      1) Increase PCEA to 12/5/10/4  2) If surgical team would like epidural removed tomorrow please hold morning dose of anticoagulation    Please call  ( btn [phone]) with any questions    Subjective:  Current pain location(s): Abdomen  Pain Scale:   0-8/10    Meds/Allergies     Allergies   Allergen Reactions    Prednisone Other (See Comments) and Confusion     Psychosis        Objective     Temp:  [97 9 °F (36 6 °C)-98 8 °F (37 1 °C)] 98 2 °F (36 8 °C)  HR:  [65-83] 65  Resp:  [16-20] 16  BP: ()/(48-68) 94/48    Physical Exam:  Gen: Well appearing, NAD  CV: RRR  Pulm: CTAB  Neuro: No focal deficits  Epidural Site: Catheter in good position, not tender to palpation, site clean    SIGNATURE: Marely Nolan MD  DATE: August 24, 2019  TIME: 11:26 AM

## 2019-08-24 NOTE — PROGRESS NOTES
Tevin Patrick Resident with [facility] regarding Bladder scan of 193ml  New order for bolus of LR 1 liter over 2 hours   brought in pt's  Rexulti from home  She takes this with her Celexa at hour of sleep  Sent it to Pharmacy  Ask Brianna Casillas to order it  She takes it because of Steroid rage rage in the past per the patient

## 2019-08-24 NOTE — PROGRESS NOTES
Walked pt  To the bathroom where she tried to void unsuccessfully  We then walked in the reich a total of 60 feet  I bladder scanned her  It was>393  I straight cathed her for 450ml of clear donna urine  When I entered the room to wake her, Daija, PCA was taking her VS  Her O2 sats were 86% on 2 L  After Walking her to the bathroom and in the reich her sats did not improve  I had to increase her to 5L at 45 degrees in bed for her sats to reach 90-91%  I explained to the pt  That she needs to do her Incentive Spirometry 10 times every hour that she is awake  I had educated her on this earlier in the shift of the 10 time s an hour and 10 breaths each time you use it  She reached 250 with it the majority of the time this evening  She takes a lot of encouragement with this  I also explained to her she has to walk at least 3 times tomorrow in the halls tomorrow  I [facility] and spoke to Edythe Duverney  I reported everything as stated above  He states to keep an eye an eye on her  Keep her at 5 L for now and if she worsens to page him

## 2019-08-24 NOTE — ASSESSMENT & PLAN NOTE
58F w/ metastatic small bowel carcinoid tumor now s/p Exlap R hemicolectomy, cholecystectomy w/ liver ablation 8/22      Plan:  Clears, add toast and advance as tolerated  D/c Epidural this AM, AM SQH held  Continue maintenance IVF  PT/OT  Heparin prophylaxis

## 2019-08-24 NOTE — PROGRESS NOTES
Progress Note - Oncology Surgery   Mason Kitchen 63 y o  female MRN: [id number]  Unit/Bed#: [facility] 917-01 Encounter: [id number]    * Carcinoid tumor of ileum  Assessment & Plan  58F w/ metastatic small bowel carcinoid tumor now s/p Exlap R hemicolectomy, cholecystectomy w/ liver ablation 8/22      Plan:  Clears, add toast and advance as tolerated  Keep Epidural, possibly discontinue tomorrow  Continue maintenance IVF  PT/OT  Heparin prophylaxis           Subjective/Objective     Subjective: Some urinary retention after gaming discontinued yesterday  Required straight cath once, but was able to void spontaneously afterward  Otherwise no acute events  Feels relatively well  Pain is controlled  Objective:    Blood pressure 120/66, pulse 73, temperature 98 6 °F (37 °C), resp  rate 16, height 5' 6" (1 676 m), weight 73 kg (161 lb), SpO2 93 %, not currently breastfeeding  ,Body mass index is 25 99 kg/m²        Intake/Output Summary (Last 24 hours) at 8/24/2019 0751  Last data filed at 8/24/2019 0600  Gross per 24 hour   Intake 4434 77 ml   Output 650 ml   Net 3784 77 ml       Invasive Devices     Peripheral Intravenous Line            Peripheral IV 08/22/19 Left Wrist 1 day    Peripheral IV 08/22/19 Right Forearm 1 day          Epidural Line            Epidural Catheter 08/22/19 1 day          Drain            Urethral Catheter Double-lumen 16 Fr  1 day                Physical Exam:   General: NAD, AAOx3  Eyes: PERRL  ENT: moist mucous membranes  Neck: supple  CV: RRR +S1/S2  Chest: breath sounds bilaterally  Abdomen: soft, minimally tender, non-distended, incision c/d/i  Extremities: atraumatic, no edema      Results from last 7 days   Lab Units 08/24/19  0437 08/23/19  0716 08/22/19  1541   WBC Thousand/uL 12 00* 10 67* 12 94*   HEMOGLOBIN g/dL 7 9* 9 3* 9 4*   HEMATOCRIT % 24 3* 28 3* 28 2*   PLATELETS Thousands/uL 148* 177 182     Results from last 7 days   Lab Units 08/24/19  0437 08/23/19  0716 08/22/19  1541 08/22/19  1330   POTASSIUM mmol/L 4 0 3 7 3 6  --    CHLORIDE mmol/L 107 110* 114*  --    CO2 mmol/L 27 25 22  --    CO2, I-STAT mmol/L  --   --   --  24   BUN mg/dL 6 5 6  --    CREATININE mg/dL 0 54* 0 60 0 59*  --    GLUCOSE, ISTAT mg/dl  --   --   --  120   CALCIUM mg/dL 8 8 8 1* 7 7*  --

## 2019-08-25 LAB
ANION GAP SERPL CALCULATED.3IONS-SCNC: 7 MMOL/L (ref 4–13)
BASOPHILS # BLD AUTO: 0.04 THOUSANDS/ΜL (ref 0–0.1)
BASOPHILS NFR BLD AUTO: 0 % (ref 0–1)
BUN SERPL-MCNC: 7 MG/DL (ref 5–25)
CALCIUM SERPL-MCNC: 8.5 MG/DL (ref 8.3–10.1)
CHLORIDE SERPL-SCNC: 105 MMOL/L (ref 100–108)
CO2 SERPL-SCNC: 26 MMOL/L (ref 21–32)
CREAT SERPL-MCNC: 0.57 MG/DL (ref 0.6–1.3)
EOSINOPHIL # BLD AUTO: 0.32 THOUSAND/ΜL (ref 0–0.61)
EOSINOPHIL NFR BLD AUTO: 3 % (ref 0–6)
ERYTHROCYTE [DISTWIDTH] IN BLOOD BY AUTOMATED COUNT: 12 % (ref 11.6–15.1)
GFR SERPL CREATININE-BSD FRML MDRD: 103 ML/MIN/1.73SQ M
GLUCOSE SERPL-MCNC: 140 MG/DL (ref 65–140)
HCT VFR BLD AUTO: 23.4 % (ref 34.8–46.1)
HGB BLD-MCNC: 7.6 G/DL (ref 11.5–15.4)
IMM GRANULOCYTES # BLD AUTO: 0.1 THOUSAND/UL (ref 0–0.2)
IMM GRANULOCYTES NFR BLD AUTO: 1 % (ref 0–2)
LYMPHOCYTES # BLD AUTO: 1.25 THOUSANDS/ΜL (ref 0.6–4.47)
LYMPHOCYTES NFR BLD AUTO: 11 % (ref 14–44)
MCH RBC QN AUTO: 30.6 PG (ref 26.8–34.3)
MCHC RBC AUTO-ENTMCNC: 32.5 G/DL (ref 31.4–37.4)
MCV RBC AUTO: 94 FL (ref 82–98)
MONOCYTES # BLD AUTO: 0.7 THOUSAND/ΜL (ref 0.17–1.22)
MONOCYTES NFR BLD AUTO: 6 % (ref 4–12)
NEUTROPHILS # BLD AUTO: 8.93 THOUSANDS/ΜL (ref 1.85–7.62)
NEUTS SEG NFR BLD AUTO: 79 % (ref 43–75)
NRBC BLD AUTO-RTO: 0 /100 WBCS
PLATELET # BLD AUTO: 154 THOUSANDS/UL (ref 149–390)
PMV BLD AUTO: 11.3 FL (ref 8.9–12.7)
POTASSIUM SERPL-SCNC: 4 MMOL/L (ref 3.5–5.3)
RBC # BLD AUTO: 2.48 MILLION/UL (ref 3.81–5.12)
SODIUM SERPL-SCNC: 138 MMOL/L (ref 136–145)
WBC # BLD AUTO: 11.34 THOUSAND/UL (ref 4.31–10.16)

## 2019-08-25 PROCEDURE — 97116 GAIT TRAINING THERAPY: CPT

## 2019-08-25 PROCEDURE — 97110 THERAPEUTIC EXERCISES: CPT

## 2019-08-25 PROCEDURE — 80048 BASIC METABOLIC PNL TOTAL CA: CPT | Performed by: STUDENT IN AN ORGANIZED HEALTH CARE EDUCATION/TRAINING PROGRAM

## 2019-08-25 PROCEDURE — 85025 COMPLETE CBC W/AUTO DIFF WBC: CPT | Performed by: STUDENT IN AN ORGANIZED HEALTH CARE EDUCATION/TRAINING PROGRAM

## 2019-08-25 PROCEDURE — 99024 POSTOP FOLLOW-UP VISIT: CPT | Performed by: SURGERY

## 2019-08-25 RX ORDER — OXYCODONE HYDROCHLORIDE 10 MG/1
10 TABLET ORAL EVERY 4 HOURS PRN
Status: DISCONTINUED | OUTPATIENT
Start: 2019-08-25 | End: 2019-08-27 | Stop reason: HOSPADM

## 2019-08-25 RX ORDER — HYDROMORPHONE HCL/PF 1 MG/ML
0.5 SYRINGE (ML) INJECTION
Status: DISCONTINUED | OUTPATIENT
Start: 2019-08-25 | End: 2019-08-27 | Stop reason: HOSPADM

## 2019-08-25 RX ORDER — OXYCODONE HYDROCHLORIDE 5 MG/1
5 TABLET ORAL EVERY 4 HOURS PRN
Status: DISCONTINUED | OUTPATIENT
Start: 2019-08-25 | End: 2019-08-27 | Stop reason: HOSPADM

## 2019-08-25 RX ORDER — SIMETHICONE 80 MG
80 TABLET,CHEWABLE ORAL EVERY 6 HOURS PRN
Status: DISCONTINUED | OUTPATIENT
Start: 2019-08-25 | End: 2019-08-27 | Stop reason: HOSPADM

## 2019-08-25 RX ORDER — ACETAMINOPHEN 325 MG/1
650 TABLET ORAL EVERY 6 HOURS PRN
Status: DISCONTINUED | OUTPATIENT
Start: 2019-08-25 | End: 2019-08-27 | Stop reason: HOSPADM

## 2019-08-25 RX ADMIN — HYDROMORPHONE HYDROCHLORIDE 0.5 MG: 1 INJECTION, SOLUTION INTRAMUSCULAR; INTRAVENOUS; SUBCUTANEOUS at 13:10

## 2019-08-25 RX ADMIN — CALCIUM CARBONATE (ANTACID) CHEW TAB 500 MG 500 MG: 500 CHEW TAB at 13:58

## 2019-08-25 RX ADMIN — BREXPIPRAZOLE 0.5 MG: 0.5 TABLET ORAL at 23:05

## 2019-08-25 RX ADMIN — ROPIVACAINE HYDROCHLORIDE: 2 INJECTION, SOLUTION EPIDURAL; INFILTRATION at 03:37

## 2019-08-25 RX ADMIN — SIMETHICONE CHEW TAB 80 MG 80 MG: 80 TABLET ORAL at 19:05

## 2019-08-25 RX ADMIN — OXYCODONE HYDROCHLORIDE 10 MG: 10 TABLET ORAL at 13:55

## 2019-08-25 RX ADMIN — HYDROMORPHONE HYDROCHLORIDE 0.5 MG: 1 INJECTION, SOLUTION INTRAMUSCULAR; INTRAVENOUS; SUBCUTANEOUS at 16:06

## 2019-08-25 RX ADMIN — OXYCODONE HYDROCHLORIDE 10 MG: 10 TABLET ORAL at 17:53

## 2019-08-25 RX ADMIN — ROPIVACAINE HYDROCHLORIDE: 2 INJECTION, SOLUTION EPIDURAL; INFILTRATION at 07:21

## 2019-08-25 RX ADMIN — CITALOPRAM HYDROBROMIDE 20 MG: 20 TABLET, FILM COATED ORAL at 20:16

## 2019-08-25 RX ADMIN — HYDROMORPHONE HYDROCHLORIDE 0.5 MG: 1 INJECTION, SOLUTION INTRAMUSCULAR; INTRAVENOUS; SUBCUTANEOUS at 19:05

## 2019-08-25 RX ADMIN — OXYCODONE HYDROCHLORIDE 5 MG: 5 TABLET ORAL at 23:37

## 2019-08-25 RX ADMIN — HEPARIN SODIUM 5000 UNITS: 5000 INJECTION INTRAVENOUS; SUBCUTANEOUS at 16:06

## 2019-08-25 RX ADMIN — ACETAMINOPHEN 650 MG: 325 TABLET ORAL at 20:34

## 2019-08-25 RX ADMIN — HEPARIN SODIUM 5000 UNITS: 5000 INJECTION INTRAVENOUS; SUBCUTANEOUS at 23:06

## 2019-08-25 NOTE — PROGRESS NOTES
Notified Dr Jonny León pt unable to void since 0830  Bladder scan was 491 ml  Will straight cath patient as ordered & will continue to monitor

## 2019-08-25 NOTE — PROGRESS NOTES
Anesthesia Progress Note - Epidural Pain Management    John Figueroa MRN: [id number]  Unit/Bed#: [facility] 917-01 Encounter: [id number]      Pertinent labs and anticoagulants reviewed  Epidural catheter removed and catheter tip intact  Site of epidural clean, dry without erythema or pus  No DVT PPX for 4 hours after removal  RN notified      Primary service to resume pain control   (please Kellielette Darrel Ponce with any questions)      SIGNATURE: Delbert Clark MD  DATE: August 25, 2019  TIME: 11:33 AM

## 2019-08-25 NOTE — PHYSICAL THERAPY NOTE
PHYSICAL THERAPY TREATMENT NOTE          Patient Name: Mary CESAR Date: 8/25/2019 08/25/19 1114   Pain Assessment   Pain Assessment 0-10   Pain Score 3   Pain Type Surgical pain   Pain Location Incision   Restrictions/Precautions   Weight Bearing Precautions Per Order No   Other Precautions Fall Risk;O2;Pain;Multiple lines  (3L NC)   General   Chart Reviewed Yes   Family/Caregiver Present No   Cognition   Overall Cognitive Status WFL   Subjective   Subjective "I wanted to go for a walk"   Bed Mobility   Additional Comments OOB in chair upon PT arrival   Pt left upright in bedside chair with all needs in reach at end of therapy session  Transfers   Sit to Stand 4  Minimal assistance  (CGA)   Additional items Assist x 1   Stand to Sit 4  Minimal assistance  (CGA)   Additional items Assist x 1   Additional Comments Transfers with/without RW   Ambulation/Elevation   Gait pattern Foward flexed; Short stride  (slow)   Gait Assistance 4  Minimal assist  (CGA)   Additional items Assist x 1   Assistive Device Rolling walker   Distance 80 ft x 2   Stair Management Assistance Not tested   Balance   Static Sitting Fair +   Dynamic Sitting Fair +   Static Standing Fair   Dynamic Standing Fair   Ambulatory Fair   Activity Tolerance   Activity Tolerance Patient tolerated treatment well   Nurse Made Aware PHYLLIS Murray cleared pt to be seen by PT   Exercises   Hip Flexion Sitting;15 reps;Bilateral  (x2)   Knee AROM Long Arc Quad Sitting;15 reps;Bilateral  (x2)   Ankle Pumps Sitting;15 reps;Bilateral  (x2)   Assessment   Prognosis Good   Problem List Decreased strength;Decreased endurance; Impaired balance;Decreased mobility;Pain   Assessment Pt seen for PT treatment session  Pt pleasant and agreeable to participate in therapy session  Pt performed STS with CGA from recliner without use of RW    Pt demonstrated ability to ambulate 80 ft x 2 with CGA and RW which is improved distance from last session  Pt required standing rest breaks x1 2/2 pain and fatigue  Pt performed LE therex as outlined above  Pt left upright in bedside chair with all needs in reach  Pt will benefit from skilled therapy in order to address current impairments and functional limitations  PT to follow pt and recommending home with family support and HHPT once medically cleared  Goals   Patient Goals to walk more   STG Expiration Date 09/02/19   Treatment Day 1   Plan   Treatment/Interventions Functional transfer training;LE strengthening/ROM; Elevations; Therapeutic exercise; Endurance training;Patient/family training;Equipment eval/education; Bed mobility;Gait training;Spoke to nursing   Progress Progressing toward goals   PT Frequency Other (Comment)  (3-5x/wk)   Recommendation   Recommendation Home with family support;Home PT   Equipment Recommended Walker   PT - OK to Discharge Yes  (once medically cleared)   Naldo Galvan, PT

## 2019-08-25 NOTE — PLAN OF CARE
Problem: PHYSICAL THERAPY ADULT  Goal: Performs mobility at highest level of function for planned discharge setting  See evaluation for individualized goals  Description  Treatment/Interventions: Functional transfer training, LE strengthening/ROM, Elevations, Therapeutic exercise, Endurance training, Patient/family training, Equipment eval/education, Bed mobility, Gait training, Spoke to nursing  Equipment Recommended: Fauzia Valentin       See flowsheet documentation for full assessment, interventions and recommendations  8/25/2019 1132 by Darryle Sidle, PT  Outcome: Progressing  Note:   Prognosis: Good  Problem List: Decreased strength, Decreased endurance, Impaired balance, Decreased mobility, Pain  Assessment: Pt seen for PT treatment session  Pt pleasant and agreeable to participate in therapy session  Pt performed STS with CGA from recliner without use of RW  Pt demonstrated ability to ambulate 80 ft x 2 with CGA and RW which is improved distance from last session  Pt required standing rest breaks x1 2/2 pain and fatigue  Pt performed LE therex as outlined above  Pt left upright in bedside chair with all needs in reach  Pt will benefit from skilled therapy in order to address current impairments and functional limitations  PT to follow pt and recommending home with family support and HHPT once medically cleared  Recommendation: Home with family support, Home PT     PT - OK to Discharge: Yes(once medically cleared)    See flowsheet documentation for full assessment

## 2019-08-25 NOTE — PHYSICAL THERAPY NOTE
PHYSICAL THERAPY TREATMENT NOTE          Patient Name: Samantha Brantley  OFNCV'E Date: 8/25/2019 08/25/19 1114   Pain Assessment   Pain Assessment 0-10   Pain Score 3   Pain Type Surgical pain   Pain Location Incision   Restrictions/Precautions   Weight Bearing Precautions Per Order No   Other Precautions Fall Risk;O2;Pain;Multiple lines  (3L NC)   General   Chart Reviewed Yes   Family/Caregiver Present No   Cognition   Overall Cognitive Status WFL   Subjective   Subjective "I wanted to go for a walk"   Bed Mobility   Additional Comments OOB in chair upon PT arrival   Pt left upright in bedside chair with all needs in reach at end of therapy session  Transfers   Sit to Stand 4  Minimal assistance  (CGA)   Additional items Assist x 1   Stand to Sit 4  Minimal assistance  (CGA)   Additional items Assist x 1   Additional Comments Transfers with/without RW   Ambulation/Elevation   Gait pattern Foward flexed; Short stride  (slow)   Gait Assistance 4  Minimal assist  (CGA)   Additional items Assist x 1   Assistive Device Rolling walker   Distance 80 ft x 2   Stair Management Assistance Not tested   Balance   Static Sitting Fair +   Dynamic Sitting Fair +   Static Standing Fair   Dynamic Standing Fair   Ambulatory Fair   Activity Tolerance   Activity Tolerance Patient tolerated treatment well   Nurse Made Aware PHYLLIS Page cleared pt to be seen by PT   Exercises   Hip Flexion Sitting;15 reps;Bilateral  (x2)   Knee AROM Long Arc Quad Sitting;15 reps;Bilateral  (x2)   Ankle Pumps Sitting;15 reps;Bilateral  (x2)   Assessment   Prognosis Good   Problem List Decreased strength;Decreased endurance; Impaired balance;Decreased mobility;Pain   Assessment Pt seen for PT treatment session  Pt pleasant and agreeable to participate in therapy session  Pt performed STS with CGA from recliner without use of RW    Pt demonstrated ability to ambulate 80 ft x 2 with CGA and RW which is improved distance from last session  Pt required standing rest breaks x1 2/2 pain and fatigue  Pt performed LE therex as outlined above  Pt left upright in bedside chair with all needs in reach  Pt will benefit from skilled therapy in order to address current impairments and functional limitations  PT to follow pt and recommending home with family support and HHPT once medically cleared  Goals   Patient Goals to walk more   STG Expiration Date 09/02/19   Short Term Goal #2 In addition to goals from initial eval: 1  Pt will demonstrated ability to negotiate 1 step with/without HR in order to return to PLOF in home  Treatment Day 1   Plan   Treatment/Interventions Functional transfer training;LE strengthening/ROM; Elevations; Therapeutic exercise; Endurance training;Patient/family training;Equipment eval/education; Bed mobility;Gait training;Spoke to nursing   Progress Progressing toward goals   PT Frequency Other (Comment)  (3-5x/wk)   Recommendation   Recommendation Home with family support;Home PT   Equipment Recommended Walker   PT - OK to Discharge Yes  (once medically cleared)   Marcio Bowles, PT

## 2019-08-25 NOTE — PROGRESS NOTES
Progress Note - Radha Linares 1960, 63 y o  female MRN: [id number]    Unit/Bed#: [facility] 917-01 Encounter: [id number]    Primary Care Provider: Praful Hall MD   Date and time admitted to hospital: 8/22/2019  8:25 AM        * Carcinoid tumor of ileum  Assessment & Plan  58F w/ metastatic small bowel carcinoid tumor now s/p Exlap R hemicolectomy, cholecystectomy w/ liver ablation 8/22      Plan:  Regular diet  D/c Epidural this AM, AM SQH held  PT/OT  Heparin prophylaxis                  Subjective/Objective     Subjective:     No acute events overnight  Pt feels comfortable this AM, denies any complaints  Objective:     Blood pressure 118/66, pulse 75, temperature 99 7 °F (37 6 °C), resp  rate 18, height 5' 6" (1 676 m), weight 73 kg (161 lb), SpO2 91 %, not currently breastfeeding  ,Body mass index is 25 99 kg/m²  Intake/Output Summary (Last 24 hours) at 8/25/2019 0454  Last data filed at 8/25/2019 0437  Gross per 24 hour   Intake 2562 05 ml   Output 2075 ml   Net 487 05 ml       Invasive Devices     Peripheral Intravenous Line            Peripheral IV 08/22/19 Left Wrist 2 days    Peripheral IV 08/22/19 Right Forearm 2 days          Epidural Line            Epidural Catheter 08/22/19 2 days                Physical Exam:     GEN: NAD  HEENT: MMM  CV: RRR  Lung: normal effort  Ab: Soft, NT/ND, incision c/d/i  Extrem: No CCE  Neuro:  A+Ox3, motor and sensation grossly intact    Lab, Imaging and other studies:CBC: No results found for: WBC, HGB, HCT, MCV, PLT, ADJUSTEDWBC, MCH, MCHC, RDW, MPV, NRBC, CMP: No results found for: SODIUM, K, CL, CO2, ANIONGAP, BUN, CREATININE, GLUCOSE, CALCIUM, AST, ALT, ALKPHOS, PROT, BILITOT, EGFR, Coagulation: No results found for: PT, INR, APTT, Urinalysis: No results found for: COLORU, CLARITYU, SPECGRAV, PHUR, LEUKOCYTESUR, NITRITE, PROTEINUA, GLUCOSEU, KETONESU, BILIRUBINUR, BLOODU, Amylase: No results found for: AMYLASE, Lipase: No results found for: LIPASE  VTE Pharmacologic Prophylaxis: Heparin  VTE Mechanical Prophylaxis: sequential compression device

## 2019-08-26 ENCOUNTER — APPOINTMENT (INPATIENT)
Dept: RADIOLOGY | Facility: HOSPITAL | Age: 59
DRG: 330 | End: 2019-08-26
Payer: COMMERCIAL

## 2019-08-26 LAB
BASOPHILS # BLD AUTO: 0.06 THOUSANDS/ΜL (ref 0–0.1)
BASOPHILS NFR BLD AUTO: 1 % (ref 0–1)
EOSINOPHIL # BLD AUTO: 0.4 THOUSAND/ΜL (ref 0–0.61)
EOSINOPHIL NFR BLD AUTO: 4 % (ref 0–6)
ERYTHROCYTE [DISTWIDTH] IN BLOOD BY AUTOMATED COUNT: 12.1 % (ref 11.6–15.1)
HCT VFR BLD AUTO: 24.6 % (ref 34.8–46.1)
HGB BLD-MCNC: 8 G/DL (ref 11.5–15.4)
IMM GRANULOCYTES # BLD AUTO: 0.18 THOUSAND/UL (ref 0–0.2)
IMM GRANULOCYTES NFR BLD AUTO: 2 % (ref 0–2)
LYMPHOCYTES # BLD AUTO: 1.67 THOUSANDS/ΜL (ref 0.6–4.47)
LYMPHOCYTES NFR BLD AUTO: 18 % (ref 14–44)
MCH RBC QN AUTO: 30.5 PG (ref 26.8–34.3)
MCHC RBC AUTO-ENTMCNC: 32.5 G/DL (ref 31.4–37.4)
MCV RBC AUTO: 94 FL (ref 82–98)
MONOCYTES # BLD AUTO: 0.61 THOUSAND/ΜL (ref 0.17–1.22)
MONOCYTES NFR BLD AUTO: 7 % (ref 4–12)
NEUTROPHILS # BLD AUTO: 6.42 THOUSANDS/ΜL (ref 1.85–7.62)
NEUTS SEG NFR BLD AUTO: 68 % (ref 43–75)
NRBC BLD AUTO-RTO: 1 /100 WBCS
PLATELET # BLD AUTO: 198 THOUSANDS/UL (ref 149–390)
PMV BLD AUTO: 11 FL (ref 8.9–12.7)
RBC # BLD AUTO: 2.62 MILLION/UL (ref 3.81–5.12)
WBC # BLD AUTO: 9.34 THOUSAND/UL (ref 4.31–10.16)

## 2019-08-26 PROCEDURE — 71045 X-RAY EXAM CHEST 1 VIEW: CPT

## 2019-08-26 PROCEDURE — 99024 POSTOP FOLLOW-UP VISIT: CPT | Performed by: SURGERY

## 2019-08-26 PROCEDURE — 85025 COMPLETE CBC W/AUTO DIFF WBC: CPT | Performed by: STUDENT IN AN ORGANIZED HEALTH CARE EDUCATION/TRAINING PROGRAM

## 2019-08-26 RX ADMIN — OXYCODONE HYDROCHLORIDE 10 MG: 10 TABLET ORAL at 03:13

## 2019-08-26 RX ADMIN — OXYCODONE HYDROCHLORIDE 10 MG: 10 TABLET ORAL at 12:35

## 2019-08-26 RX ADMIN — CITALOPRAM HYDROBROMIDE 20 MG: 20 TABLET, FILM COATED ORAL at 21:42

## 2019-08-26 RX ADMIN — ACETAMINOPHEN 650 MG: 325 TABLET ORAL at 11:59

## 2019-08-26 RX ADMIN — HEPARIN SODIUM 5000 UNITS: 5000 INJECTION INTRAVENOUS; SUBCUTANEOUS at 05:25

## 2019-08-26 RX ADMIN — OXYCODONE HYDROCHLORIDE 5 MG: 5 TABLET ORAL at 16:58

## 2019-08-26 RX ADMIN — OXYCODONE HYDROCHLORIDE 10 MG: 10 TABLET ORAL at 20:40

## 2019-08-26 RX ADMIN — HEPARIN SODIUM 5000 UNITS: 5000 INJECTION INTRAVENOUS; SUBCUTANEOUS at 21:42

## 2019-08-26 RX ADMIN — HYDROMORPHONE HYDROCHLORIDE 0.5 MG: 1 INJECTION, SOLUTION INTRAMUSCULAR; INTRAVENOUS; SUBCUTANEOUS at 05:25

## 2019-08-26 RX ADMIN — BREXPIPRAZOLE 0.5 MG: 0.5 TABLET ORAL at 21:43

## 2019-08-26 RX ADMIN — OXYCODONE HYDROCHLORIDE 10 MG: 10 TABLET ORAL at 08:37

## 2019-08-26 RX ADMIN — HEPARIN SODIUM 5000 UNITS: 5000 INJECTION INTRAVENOUS; SUBCUTANEOUS at 14:06

## 2019-08-26 NOTE — PLAN OF CARE
Problem: PAIN - ADULT  Goal: Verbalizes/displays adequate comfort level or baseline comfort level  Description  Interventions:  - Encourage patient to monitor pain and request assistance  - Assess pain using appropriate pain scale  - Administer analgesics based on type and severity of pain and evaluate response  - Implement non-pharmacological measures as appropriate and evaluate response  - Consider cultural and social influences on pain and pain management  - Notify physician/advanced practitioner if interventions unsuccessful or patient reports new pain  Outcome: Progressing     Problem: INFECTION - ADULT  Goal: Absence or prevention of progression during hospitalization  Description  INTERVENTIONS:  - Assess and monitor for signs and symptoms of infection  - Monitor lab/diagnostic results  - Monitor all insertion sites, i e  indwelling lines, tubes, and drains  - Monitor endotracheal if appropriate and nasal secretions for changes in amount and color  - [place] appropriate cooling/warming therapies per order  - Administer medications as ordered  - Instruct and encourage patient and family to use good hand hygiene technique  - Identify and instruct in appropriate isolation precautions for identified infection/condition  Outcome: Progressing  Goal: Absence of fever/infection during neutropenic period  Description  INTERVENTIONS:  - Monitor WBC    Outcome: Progressing     Problem: SAFETY ADULT  Goal: Patient will remain free of falls  Description  INTERVENTIONS:  - Assess patient frequently for physical needs  -  Identify cognitive and physical deficits and behaviors that affect risk of falls    -  [place] fall precautions as indicated by assessment   - Educate patient/family on patient safety including physical limitations  - Instruct patient to call for assistance with activity based on assessment  - Modify environment to reduce risk of injury  - Consider OT/PT consult to assist with strengthening/mobility  Outcome: Progressing  Goal: Maintain or return to baseline ADL function  Description  INTERVENTIONS:  -  Assess patient's ability to carry out ADLs; assess patient's baseline for ADL function and identify physical deficits which impact ability to perform ADLs (bathing, care of mouth/teeth, toileting, grooming, dressing, etc )  - Assess/evaluate cause of self-care deficits   - Assess range of motion  - Assess patient's mobility; develop plan if impaired  - Assess patient's need for assistive devices and provide as appropriate  - Encourage maximum independence but intervene and supervise when necessary  - Involve family in performance of ADLs  - Assess for home care needs following discharge   - Consider OT consult to assist with ADL evaluation and planning for discharge  - Provide patient education as appropriate  Outcome: Progressing  Goal: Maintain or return mobility status to optimal level  Description  INTERVENTIONS:  - Assess patient's baseline mobility status (ambulation, transfers, stairs, etc )    - Identify cognitive and physical deficits and behaviors that affect mobility  - Identify mobility aids required to assist with transfers and/or ambulation (gait belt, sit-to-stand, lift, walker, cane, etc )  - [place] fall precautions as indicated by assessment  - Record patient progress and toleration of activity level on Mobility SBAR; progress patient to next Phase/Stage  - Instruct patient to call for assistance with activity based on assessment  - Consider rehabilitation consult to assist with strengthening/weightbearing, etc   Outcome: Progressing     Problem: Prexisting or High Potential for Compromised Skin Integrity  Goal: Skin integrity is maintained or improved  Description  INTERVENTIONS:  - Identify patients at risk for skin breakdown  - Assess and monitor skin integrity  - Assess and monitor nutrition and hydration status  - Monitor labs   - Assess for incontinence   - Turn and reposition patient  - Assist with mobility/ambulation  - Relieve pressure over bony prominences  - Avoid friction and shearing  - Provide appropriate hygiene as needed including keeping skin clean and dry  - Evaluate need for skin moisturizer/barrier cream  - Collaborate with interdisciplinary team   - Patient/family teaching  - Consider wound care consult   Outcome: Progressing     Problem: Potential for Falls  Goal: Patient will remain free of falls  Description  INTERVENTIONS:  - Assess patient frequently for physical needs  -  Identify cognitive and physical deficits and behaviors that affect risk of falls    -  [place] fall precautions as indicated by assessment   - Educate patient/family on patient safety including physical limitations  - Instruct patient to call for assistance with activity based on assessment  - Modify environment to reduce risk of injury  - Consider OT/PT consult to assist with strengthening/mobility  Outcome: Progressing

## 2019-08-26 NOTE — SOCIAL WORK
DCP-     A post acute care recommendation was made by your care team for home PT  Discussed [place] [facility] with patient  PT would prefer to remain with [facility] provider  Referral entered in [facility] for  [facility]    PT states she has $40 00 co-pay for home care visits  She does not think she will need services on disch  PT is working with pt daily    Will re-assess closer to discharge the need for therapy

## 2019-08-26 NOTE — PROGRESS NOTES
Notified of immediate findings on chest xray  [facility] surgery notified and aware of small pleural effusion on right

## 2019-08-27 VITALS
OXYGEN SATURATION: 90 % | WEIGHT: 161 LBS | HEIGHT: 66 IN | RESPIRATION RATE: 18 BRPM | TEMPERATURE: 98.4 F | BODY MASS INDEX: 25.88 KG/M2 | DIASTOLIC BLOOD PRESSURE: 91 MMHG | HEART RATE: 78 BPM | SYSTOLIC BLOOD PRESSURE: 141 MMHG

## 2019-08-27 PROCEDURE — 99024 POSTOP FOLLOW-UP VISIT: CPT | Performed by: SURGERY

## 2019-08-27 RX ORDER — OXYCODONE HYDROCHLORIDE 5 MG/1
5 TABLET ORAL EVERY 4 HOURS PRN
Qty: 30 TABLET | Refills: 0
Start: 2019-08-27 | End: 2019-09-06

## 2019-08-27 RX ADMIN — OXYCODONE HYDROCHLORIDE 10 MG: 10 TABLET ORAL at 05:23

## 2019-08-27 RX ADMIN — ACETAMINOPHEN 650 MG: 325 TABLET ORAL at 07:24

## 2019-08-27 RX ADMIN — OXYCODONE HYDROCHLORIDE 10 MG: 10 TABLET ORAL at 10:00

## 2019-08-27 RX ADMIN — HEPARIN SODIUM 5000 UNITS: 5000 INJECTION INTRAVENOUS; SUBCUTANEOUS at 05:21

## 2019-08-27 RX ADMIN — OXYCODONE HYDROCHLORIDE 10 MG: 10 TABLET ORAL at 01:19

## 2019-08-27 NOTE — DISCHARGE INSTRUCTIONS
Activity:    Do not lift more than 10 pounds (a gallon of milk) for 1-2 weeks post-operatively    Walking is encouraged  Normal daily activities including climbing steps are okay  Do not engage in strenuous activity or contact sports for 4-6 weeks post-operatively    Return to work:    Return to work to be discussed at first post-operative visit    Diet:    You may return to your normal heart healthy diet    Wound Care: Your wound is closed with Histoacryl  It is okay to shower  Wash incision gently with soap and water and pat dry  Do not soak incisions in bath water or swim for two weeks  Do not apply any creams or ointments  Ice as needed    Pain Medication:    Please take as directed  No driving while taking narcotic pain medications    Other:  If you have questions after discharge please call the office    If you have increased pain, fever >101 5, increased drainage, redness or a bad smell at your surgery site, please call us immediately or come directly to the Emergency Room

## 2019-08-27 NOTE — PROGRESS NOTES
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/ metastatic small bowel carcinoid tumor now s/p Exlap R hemicolectomy, cholecystectomy w/ liver ablation 8/22      Plan:  Regular  Prn pain control  Off O2  OOB/ambulate  Heparin prophylaxis  dispo planning                  Subjective/Objective     Subjective: No acute events overnight  Denies n/v  Passing flatus, no BM      Objective:     Blood pressure 152/86, pulse 88, temperature 100 °F (37 8 °C), temperature source Oral, resp  rate 16, height 5' 6" (1 676 m), weight 73 kg (161 lb), SpO2 90 %, not currently breastfeeding  ,Body mass index is 25 99 kg/m²        Intake/Output Summary (Last 24 hours) at 8/27/2019 0503  Last data filed at 8/27/2019 0356  Gross per 24 hour   Intake 640 ml   Output 2275 ml   Net -1635 ml       Invasive Devices     Peripheral Intravenous Line            Peripheral IV 08/26/19 Left Wrist less than 1 day                Physical Exam:   NAD  Atraumatic/normocephalic  AAOX3  Normal mood and affect  Normal respiratory effort, 3L NC  Soft, TTP RLQ, slightly distended, incision c/d/i with staples  Skin warm/dry  Cap refil <2 sec  Lab, Imaging and other studies:  CBC:   Lab Results   Component Value Date    WBC 9 34 08/26/2019    HGB 8 0 (L) 08/26/2019    HCT 24 6 (L) 08/26/2019    MCV 94 08/26/2019     08/26/2019    MCH 30 5 08/26/2019    MCHC 32 5 08/26/2019    RDW 12 1 08/26/2019    MPV 11 0 08/26/2019    NRBC 1 08/26/2019   , CMP: No results found for: SODIUM, K, CL, CO2, ANIONGAP, BUN, CREATININE, GLUCOSE, CALCIUM, AST, ALT, ALKPHOS, PROT, BILITOT, EGFR  VTE Pharmacologic Prophylaxis: Heparin  VTE Mechanical Prophylaxis: sequential compression device

## 2019-08-27 NOTE — ASSESSMENT & PLAN NOTE
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

## 2019-08-27 NOTE — DISCHARGE SUMMARY
Discharge Summary - Surgical Oncology  Reid Roosevelt 63 y o  female MRN: [id number]  [facility] Encounter: [id number]    Admission Date: 8/22/2019     Discharge Date: 08/27/19    Admitting Diagnosis: Carcinoid tumor of ileum [D3A 012]    Discharge Diagnosis: same    Attending: Dr Gunner Gutierrez Physician(s): Dr Sharri Henry, APS    Procedures Performed:   8/22 Ex-lap, R hemicolectomy, cholecystectomy, liver ablation - Dr Maddie Freitas    Pathology: pending    Hospital Course: 64 yo F with known metastatic carcinoid tumor and PMHx of scoliosis, psychiatric disorder, tubal ligation, hysterectomy, breast lumpectomy presents for planned surgical resection  She underwent open right hemicolectomy, cholecystectomy, liver ablation and was admitted for postoperative monitoring  Postoperative analgesia was achieved with epidural, which had be discontinued on postoperative day 3  Her diet was slowly advanced, and she was tolerating adequate PO by postoperative day 5  She was discharged home with instructions to follow up with Dr Maddie Freitas in the office  Condition at Discharge: good     Discharge instructions/Information to patient and family:   See after visit summary for information provided to patient and family  Provisions for Follow-Up Care:  See after visit summary for information related to follow-up care and any pertinent home health orders  Disposition: Home    Planned Readmission: No    Discharge Statement   I spent 20 minutes discharging the patient  This time was spent on the day of discharge  I had direct contact with the patient on the day of discharge  Additional documentation is required if more than 30 minutes were spent on discharge  Discharge Medications:  See after visit summary for reconciled discharge medications provided to patient and family

## 2019-09-05 ENCOUNTER — OFFICE VISIT (OUTPATIENT)
Dept: SURGICAL ONCOLOGY | Facility: CLINIC | Age: 59
End: 2019-09-05

## 2019-09-05 VITALS
TEMPERATURE: 98.8 F | HEIGHT: 66 IN | HEART RATE: 64 BPM | DIASTOLIC BLOOD PRESSURE: 82 MMHG | RESPIRATION RATE: 16 BRPM | WEIGHT: 157.5 LBS | SYSTOLIC BLOOD PRESSURE: 110 MMHG | BODY MASS INDEX: 25.31 KG/M2

## 2019-09-05 DIAGNOSIS — D3A.012 CARCINOID TUMOR OF ILEUM: Primary | ICD-10-CM

## 2019-09-05 PROCEDURE — 99024 POSTOP FOLLOW-UP VISIT: CPT | Performed by: SURGERY

## 2019-09-05 NOTE — LETTER
September 5, 2019     Stanton Castillo MD  [address]  [address]    Patient: Jaquan Paulino   YOB: 1960   Date of Visit: 9/5/2019       Dear Dr Maria Ines Hancock: Thank you for referring Dionimartin Thompson to me for evaluation  Below are my notes for this consultation  If you have questions, please do not hesitate to call me  I look forward to following your patient along with you  Sincerely,        Marco Kline MD        CC: Geovanna Phillips MD PhD  Marco Kline MD  9/5/2019 11:43 AM  Sign at close encounter               Surgical Oncology Follow Up       [facility]  [address] [facility] [address]  1960  [id number]  [facility]  [facility] [address] [address]    Diagnoses and all orders for this visit:    Carcinoid tumor of ileum  -     Cancel: Ambulatory referral to Hematology / Oncology; Future  -     CT abdomen pelvis w contrast; Future  -     BUN; Future  -     Creatinine, serum; Future        Chief Complaint   Patient presents with    Post-op     Pt is here for post-op visit following hemicolectomy and liver ablation  Incision is healing well; staples to be removed  Pt reports that pre-surgical diarrhea has resolved and she is feeling well  Return in about 3 months (around 12/5/2019) for Office Visit, Imaging - See orders  Carcinoid tumor of ileum    6/7/2019 Initial Diagnosis     Carcinoid tumor of ileum      8/22/2019 Surgery     Right hemicolectomy, cholecystectomy  - 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)  - Lymphovascular and perineural invasion are present  T3N1M1  G1  3/24 LN         Staging:  Metastatic neuroendocrine tumor of the ileum, [id number]    August 2009  Treatment history:  Right hemicolectomy, intraoperative ultrasound of the liver and microwave ablation of four liver lesions (medial right lobe, segment 7, segment 6/7, segment 6)  Current treatment:  Octreotide pending  Disease status: ANIYA    History of Present Illness:  Patient returns in follow-up of her right hemicolectomy and ablation of four liver lesions  She is doing well  No fevers, chills  No diarrhea  This was present after surgery but has resolved  Review of Systems  Complete ROS Surg Onc:   Complete ROS Surg Onc:   Constitutional: The patient denies new or recent history of general fatigue, no recent weight loss, no change in appetite  Eyes: No complaints of visual problems, no scleral icterus  ENT: no complaints of ear pain, no hoarseness, no difficulty swallowing,  no tinnitus and no new masses in head, oral cavity, or neck  Cardiovascular: No complaints of chest pain, no palpitations, no ankle edema  Respiratory: No complaints of shortness of breath, no cough  Gastrointestinal: No complaints of jaundice, no bloody stools, no pale stools  Genitourinary: No complaints of dysuria, no hematuria, no nocturia, no frequent urination, no urethral discharge  Musculoskeletal: No complaints of weakness, paralysis, joint stiffness or arthralgias  Integumentary: No complaints of rash, no new lesions  Neurological: No complaints of convulsions, no seizures, no dizziness  Hematologic/Lymphatic: No complaints of easy bruising  Endocrine:  No hot or cold intolerance  No polydipsia, polyphagia, or polyuria  Allergy/immunology:  No environmental allergies  No food allergies  Not immunocompromised  Skin:  No pallor or rash  Surgical wound          Patient Active Problem List   Diagnosis    Chronic bilateral low back pain    Colon cancer screening    Depression    Scoliosis of thoracic spine    Right leg pain    Screening for colon cancer    Carcinoid tumor of ileum    Diarrhea    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 ([facility] [address] )    Other long term (current) drug therapy     Past Medical History:   Diagnosis Date    Cancer [facility])     neuroendocrine tumor    Psychiatric disorder     Scoliosis     Strep throat     Weight gain      Past Surgical History:   Procedure Laterality Date    BREAST BIOPSY      BREAST LUMPECTOMY      benign    CHOLECYSTECTOMY N/A 8/22/2019    Procedure: CHOLECYSTECTOMY;  Surgeon: Summer Wilder MD;  [facility];  Service: Surgical Oncology    COLECTOMY TOTAL Right 8/22/2019    Procedure: HEMICOLECTOMY;  Surgeon: Summer Wilder MD;  [facility];  Service: Surgical Oncology    HYSTERECTOMY      LAPAROTOMY N/A 8/22/2019    Procedure: LAPAROTOMY EXPLORATORY;  Surgeon: Summer Wilder MD;  [facility];  Service: Surgical Oncology    PARTIAL HYSTERECTOMY      NH ABLTJ 1/> LVR BRYNN PRQ RF N/A 8/22/2019    Procedure: ABLATION MICROWAVE; INTRAOPERATIVE ULTRASOUND OF THE LIVER;  Surgeon: Summer Wilder MD;  [facility];  Service: Surgical Oncology    TUBAL LIGATION       Family History   Problem Relation Age of Onset    Thyroid cancer Mother 76    Multiple myeloma Father 68     Social History     Socioeconomic History    Marital status:      Spouse name: Not on file    Number of children: Not on file    Years of education: Not on file    Highest education level: Not on file   Occupational History    Not on file   Social Needs    Financial resource strain: Not on file    Food insecurity:     Worry: Not on file     Inability: Not on file    Transportation needs:     Medical: Not on file     Non-medical: Not on file   Tobacco Use    Smoking status: Never Smoker    Smokeless tobacco: Never Used   Substance and Sexual Activity    Alcohol use: Not Currently     Frequency: Never     Binge frequency: Never    Drug use: Not Currently    Sexual activity: Yes   Lifestyle    Physical activity:     Days per week: Not on file     Minutes per session: Not on file    Stress: Not on file   Relationships    Social connections:     Talks on phone: Not on file     Gets together: Not on file     Attends Yazidi service: Not on file     Active member of club or organization: Not on file     Attends meetings of clubs or organizations: Not on file     Relationship status: Not on file    Intimate partner violence:     Fear of current or ex partner: Not on file     Emotionally abused: Not on file     Physically abused: Not on file     Forced sexual activity: Not on file   Other Topics Concern    Not on file   Social History Narrative      2 sons    2 Grandkids       Current Outpatient Medications:     Brexpiprazole (REXULTI) 0 5 MG tablet, Take 0 5 mg by mouth daily at bedtime , Disp: , Rfl:     citalopram (CeleXA) 20 mg tablet, Take 1 tablet (20 mg total) by mouth daily (Patient taking differently: Take 20 mg by mouth daily at bedtime ), Disp: 30 tablet, Rfl: 2    octreotide (SandoSTATIN LAR DEPOT) 10 mg IM injection kit, Inject 10 mg into a muscle every 28 days, Disp: , Rfl:     oxyCODONE (ROXICODONE) 5 mg immediate release tablet, Take 1 tablet (5 mg total) by mouth every 4 (four) hours as needed for severe pain for up to 10 daysMax Daily Amount: 30 mg, Disp: 30 tablet, Rfl: 0  Allergies   Allergen Reactions    Prednisone Other (See Comments) and Confusion     Psychosis      Vitals:    09/05/19 1129   BP: 110/82   Pulse: 64   Resp: 16   Temp: 98 8 °F (37 1 °C)       Physical Exam  Constitutional: General appearance: The Patient is well-developed and well-nourished who appears the stated age in no acute distress  Patient is pleasant and talkative  HEENT:  Normocephalic  Sclerae are anicteric  Mucous membranes are moist   Abdomen: Abdomen is soft, non-tender, non-distended and without masses  Incision is C/D/I  Extremities: There is no clubbing or cyanosis  There is no edema  Symmetric  Neuro: Grossly nonfocal  Gait is normal        Skin: Warm, anicteric      Psych:  Patient is pleasant and talkative  Breasts:        Pathology:  Final Diagnosis   A  Terminal ileum, ascending colon, and appendix, right hemicolectomy:  - 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), G1, see synoptic report for details  - Lymphovascular and perineural invasion are present  - Colon with no pathologic abnormality   - Appendix with distal fibrous obliteration   - All margins are negative for tumor   - Three of twenty-four lymph nodes are positive for tumor (3/24)     Comment: Immunohistochemistry was performed on block A8  The tumor cells are positive for synaptophysin, chromogranin and CD56  Ki-67 shows mitotic proliferative index of approximately 1%  Positive and negative controls were evaluated and were appropriate      Immunohistochemistry for desmin is performed on tissue block A11 and confirms vascular invasion     A  Gallbladder, cholecystectomy:  - Gallbladder with no significant pathologic abnormality  - Negative for malignancy      Intradepartmental consultation is in agreement  Interpretation performed at [place] [address]      Electronically signed by Therese Laird MD on 8/29/2019 at  2:44 PM   Additional Information    All controls performed with the immunohistochemical stains reported above reacted appropriately  These tests were developed and their performance characteristics determined by [facility] or [facility]  They may not be cleared or approved by the [place]  Food and Drug Administration  The FDA has determined that such clearance or approval is not necessary  These tests are used for clinical purposes  They should not be regarded as investigational or for research  This laboratory has been approved by CLIA 88, designated as a high-complexity laboratory and is qualified to perform these tests     Synoptic Checklist   JEJUNUM AND ILEUM NEUROENDOCRINE TUMOR   Jej Ileum NET - All Specimens   SPECIMEN   Procedure right hemicolectomy    TUMOR   Tumor Site  Ileum    Histologic Type and Grade  G1: Well-differentiated neuroendocrine tumor         Mitotic Rate  < 2 mitoses / 2 mm2         Ki-67  Labeling Index  < 3%    Tumor Size  Greatest dimension in Centimeters (cm): 2 0 Centimeters (cm)   Additional Dimension in Centimeters (cm)  1 6 Centimeters (cm)     1 3 Centimeters (cm)   Tumor Focality  Unifocal    Tumor Extent     Tumor Extension  Tumor invades through the muscularis propria into subserosal tissue without penetration of overlying serosa    Accessory Findings     Lymphovascular Invasion  Present    Perineural Invasion  Present    Large Mesenteric Masses (> 2 cm)  Not identified    MARGINS   Margins  All margins are uninvolved by tumor    Margins Examined  Proximal      Distal      Radial or mesenteric    Distance of Tumor from Closest Margin  3 Centimeters (cm)   Closest Margin  Radial or mesenteric    LYMPH NODES   Number of Lymph Nodes Involved  3    Number of Lymph Nodes Examined  24    PATHOLOGIC STAGE CLASSIFICATION (pTNM, AJCC 8th Edition)   Primary Tumor (pT)  pT3    Regional Lymph Nodes (pN)  pN1    ADDITIONAL FINDINGS   Additional Pathologic Findings  None identified             Labs:      Imaging  Xr Chest Portable    Result Date: 8/26/2019  Narrative: CHEST INDICATION:   increased 02 requirement  COMPARISON:  PET CT scan July 9, 2019 EXAM PERFORMED/VIEWS:  XR CHEST PORTABLE FINDINGS: Cardiomediastinal silhouette appears unremarkable  There is a limited inspiration  There is bibasilar pulmonary opacity which could be atelectasis or pneumonia  There is right pleural effusion which is small  No pneumothorax or mediastinal shift  There seems to be an S-shaped scoliosis of the thoracolumbar spine  There are skin staples seen overlying the midline of the abdomen  Impression: Bibasilar lung infiltrates versus atelectasis  Pleural effusion on the right   Workstation performed: [id number]     I reviewed the above laboratory and imaging data  Discussion/Summary:  67-year-old female status post right hemicolectomy, and microwave ablation of four right-sided liver lesions for a [id number] neuroendocrine tumor ileum  She is doing very well  She is still on octreotide  I will plan on repeating her imaging in 3 months to identify the ablation zones and to act as a new baseline  If these are stable, we will likely perform every 6 month imaging  She is agreeable to this plan  All her questions were answered

## 2019-09-05 NOTE — PROGRESS NOTES
Surgical Oncology Follow Up       [address] SURGICAL ONCOLOGY [place]  [address]  1960  [id number]  [address] SURGICAL ONCOLOGY [place]  [address] [address]    Diagnoses and all orders for this visit:    Carcinoid tumor of ileum  -     Cancel: Ambulatory referral to Hematology / Oncology; Future  -     CT abdomen pelvis w contrast; Future  -     BUN; Future  -     Creatinine, serum; Future        Chief Complaint   Patient presents with    Post-op     Pt is here for post-op visit following hemicolectomy and liver ablation  Incision is healing well; staples to be removed  Pt reports that pre-surgical diarrhea has resolved and she is feeling well  Return in about 3 months (around 12/5/2019) for Office Visit, Imaging - See orders  Carcinoid tumor of ileum    6/7/2019 Initial Diagnosis     Carcinoid tumor of ileum      8/22/2019 Surgery     Right hemicolectomy, cholecystectomy  - 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)  - Lymphovascular and perineural invasion are present  T3N1M1  G1  3/24 LN         Staging:  Metastatic neuroendocrine tumor of the ileum, [id number]  August 2009  Treatment history:  Right hemicolectomy, intraoperative ultrasound of the liver and microwave ablation of four liver lesions (medial right lobe, segment 7, segment 6/7, segment 6)  Current treatment:  Octreotide pending  Disease status: ANIYA    History of Present Illness:  Patient returns in follow-up of her right hemicolectomy and ablation of four liver lesions  She is doing well  No fevers, chills  No diarrhea  This was present after surgery but has resolved      Review of Systems  Complete ROS Surg Onc:   Complete ROS Surg Onc:   Constitutional: The patient denies new or recent history of general fatigue, no recent weight loss, no change in appetite  Eyes: No complaints of visual problems, no scleral icterus  ENT: no complaints of ear pain, no hoarseness, no difficulty swallowing,  no tinnitus and no new masses in head, oral cavity, or neck  Cardiovascular: No complaints of chest pain, no palpitations, no ankle edema  Respiratory: No complaints of shortness of breath, no cough  Gastrointestinal: No complaints of jaundice, no bloody stools, no pale stools  Genitourinary: No complaints of dysuria, no hematuria, no nocturia, no frequent urination, no urethral discharge  Musculoskeletal: No complaints of weakness, paralysis, joint stiffness or arthralgias  Integumentary: No complaints of rash, no new lesions  Neurological: No complaints of convulsions, no seizures, no dizziness  Hematologic/Lymphatic: No complaints of easy bruising  Endocrine:  No hot or cold intolerance  No polydipsia, polyphagia, or polyuria  Allergy/immunology:  No environmental allergies  No food allergies  Not immunocompromised  Skin:  No pallor or rash  Surgical wound          Patient Active Problem List   Diagnosis    Chronic bilateral low back pain    Colon cancer screening    Depression    Scoliosis of thoracic spine    Right leg pain    Screening for colon cancer    Carcinoid tumor of ileum    Diarrhea    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 ([facility] [address] )    Other long term (current) drug therapy     Past Medical History:   Diagnosis Date    Cancer [facility])     neuroendocrine tumor    Psychiatric disorder     Scoliosis     Strep throat     Weight gain      Past Surgical History:   Procedure Laterality Date    BREAST BIOPSY      BREAST LUMPECTOMY      benign    CHOLECYSTECTOMY N/A 8/22/2019    Procedure: CHOLECYSTECTOMY;  Surgeon: Alexandra Simpson MD;  [facility];  Service: Surgical Oncology    COLECTOMY TOTAL Right 8/22/2019    Procedure: HEMICOLECTOMY;  Surgeon: Alexandra Simpson MD;  [facility];  Service: Surgical Oncology    HYSTERECTOMY      LAPAROTOMY N/A 8/22/2019    Procedure: LAPAROTOMY EXPLORATORY;  Surgeon: Jacquie Billingsley MD;  [facility];  Service: Surgical Oncology    PARTIAL HYSTERECTOMY      MN ABLTJ 1/> LVR BRYNN PRQ RF N/A 8/22/2019    Procedure: ABLATION MICROWAVE; INTRAOPERATIVE ULTRASOUND OF THE LIVER;  Surgeon: Jacquie Billingsley MD;  [facility];  Service: Surgical Oncology    TUBAL LIGATION       Family History   Problem Relation Age of Onset    Thyroid cancer Mother 76    Multiple myeloma Father 68     Social History     Socioeconomic History    Marital status:      Spouse name: Not on file    Number of children: Not on file    Years of education: Not on file    Highest education level: Not on file   Occupational History    Not on file   Social Needs    Financial resource strain: Not on file    Food insecurity:     Worry: Not on file     Inability: Not on file    Transportation needs:     Medical: Not on file     Non-medical: Not on file   Tobacco Use    Smoking status: Never Smoker    Smokeless tobacco: Never Used   Substance and Sexual Activity    Alcohol use: Not Currently     Frequency: Never     Binge frequency: Never    Drug use: Not Currently    Sexual activity: Yes   Lifestyle    Physical activity:     Days per week: Not on file     Minutes per session: Not on file    Stress: Not on file   Relationships    Social connections:     Talks on phone: Not on file     Gets together: Not on file     Attends Yarsani service: Not on file     Active member of club or organization: Not on file     Attends meetings of clubs or organizations: Not on file     Relationship status: Not on file    Intimate partner violence:     Fear of current or ex partner: Not on file     Emotionally abused: Not on file     Physically abused: Not on file     Forced sexual activity: Not on file   Other Topics Concern    Not on file   Social History Narrative          2 sons    2 Grandkids       Current Outpatient Medications:     Brexpiprazole (REXULTI) 0 5 MG tablet, Take 0 5 mg by mouth daily at bedtime , Disp: , Rfl:     citalopram (CeleXA) 20 mg tablet, Take 1 tablet (20 mg total) by mouth daily (Patient taking differently: Take 20 mg by mouth daily at bedtime ), Disp: 30 tablet, Rfl: 2    octreotide (SandoSTATIN LAR DEPOT) 10 mg IM injection kit, Inject 10 mg into a muscle every 28 days, Disp: , Rfl:     oxyCODONE (ROXICODONE) 5 mg immediate release tablet, Take 1 tablet (5 mg total) by mouth every 4 (four) hours as needed for severe pain for up to 10 daysMax Daily Amount: 30 mg, Disp: 30 tablet, Rfl: 0  Allergies   Allergen Reactions    Prednisone Other (See Comments) and Confusion     Psychosis      Vitals:    09/05/19 1129   BP: 110/82   Pulse: 64   Resp: 16   Temp: 98 8 °F (37 1 °C)       Physical Exam  Constitutional: General appearance: The Patient is well-developed and well-nourished who appears the stated age in no acute distress  Patient is pleasant and talkative  HEENT:  Normocephalic  Sclerae are anicteric  Mucous membranes are moist   Abdomen: Abdomen is soft, non-tender, non-distended and without masses  Incision is C/D/I  Extremities: There is no clubbing or cyanosis  There is no edema  Symmetric  Neuro: Grossly nonfocal  Gait is normal        Skin: Warm, anicteric  Psych:  Patient is pleasant and talkative  Breasts:        Pathology:  Final Diagnosis   A  Terminal ileum, ascending colon, and appendix, right hemicolectomy:  - 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), G1, see synoptic report for details  - Lymphovascular and perineural invasion are present  - Colon with no pathologic abnormality   - Appendix with distal fibrous obliteration   - All margins are negative for tumor   - Three of twenty-four lymph nodes are positive for tumor (3/24)     Comment: Immunohistochemistry was performed on block A8   The tumor cells are positive for synaptophysin, chromogranin and CD56  Ki-67 shows mitotic proliferative index of approximately 1%  Positive and negative controls were evaluated and were appropriate      Immunohistochemistry for desmin is performed on tissue block A11 and confirms vascular invasion     A  Gallbladder, cholecystectomy:  - Gallbladder with no significant pathologic abnormality  - Negative for malignancy      Intradepartmental consultation is in agreement  Interpretation performed at [place] [address]      Electronically signed by Lilian Berkowitz MD on 8/29/2019 at  2:44 PM   Additional Information    All controls performed with the immunohistochemical stains reported above reacted appropriately  These tests were developed and their performance characteristics determined by [facility] or [facility]  They may not be cleared or approved by the [place]  Food and Drug Administration  The FDA has determined that such clearance or approval is not necessary  These tests are used for clinical purposes  They should not be regarded as investigational or for research  This laboratory has been approved by CLIA 88, designated as a high-complexity laboratory and is qualified to perform these tests     Synoptic Checklist   JEJUNUM AND ILEUM NEUROENDOCRINE TUMOR   Jej Ileum NET - All Specimens   SPECIMEN   Procedure  right hemicolectomy    TUMOR   Tumor Site  Ileum    Histologic Type and Grade  G1: Well-differentiated neuroendocrine tumor         Mitotic Rate  < 2 mitoses / 2 mm2         Ki-67  Labeling Index  < 3%    Tumor Size  Greatest dimension in Centimeters (cm): 2 0 Centimeters (cm)   Additional Dimension in Centimeters (cm)  1 6 Centimeters (cm)     1 3 Centimeters (cm)   Tumor Focality  Unifocal    Tumor Extent     Tumor Extension  Tumor invades through the muscularis propria into subserosal tissue without penetration of overlying serosa    Accessory Findings     Lymphovascular Invasion  Present    Perineural Invasion  Present    Large Mesenteric Masses (> 2 cm)  Not identified    MARGINS   Margins  All margins are uninvolved by tumor    Margins Examined  Proximal      Distal      Radial or mesenteric    Distance of Tumor from Closest Margin  3 Centimeters (cm)   Closest Margin  Radial or mesenteric    LYMPH NODES   Number of Lymph Nodes Involved  3    Number of Lymph Nodes Examined  24    PATHOLOGIC STAGE CLASSIFICATION (pTNM, AJCC 8th Edition)   Primary Tumor (pT)  pT3    Regional Lymph Nodes (pN)  pN1    ADDITIONAL FINDINGS   Additional Pathologic Findings  None identified             Labs:      Imaging  Xr Chest Portable    Result Date: 8/26/2019  Narrative: CHEST INDICATION:   increased 02 requirement  COMPARISON:  PET CT scan July 9, 2019 EXAM PERFORMED/VIEWS:  XR CHEST PORTABLE FINDINGS: Cardiomediastinal silhouette appears unremarkable  There is a limited inspiration  There is bibasilar pulmonary opacity which could be atelectasis or pneumonia  There is right pleural effusion which is small  No pneumothorax or mediastinal shift  There seems to be an S-shaped scoliosis of the thoracolumbar spine  There are skin staples seen overlying the midline of the abdomen  Impression: Bibasilar lung infiltrates versus atelectasis  Pleural effusion on the right  Workstation performed: [id number]     I reviewed the above laboratory and imaging data  Discussion/Summary:  27-year-old female status post right hemicolectomy, and microwave ablation of four right-sided liver lesions for a [id number] neuroendocrine tumor ileum  She is doing very well  She is still on octreotide  I will plan on repeating her imaging in 3 months to identify the ablation zones and to act as a new baseline  If these are stable, we will likely perform every 6 month imaging  She is agreeable to this plan  All her questions were answered

## 2019-09-13 ENCOUNTER — OFFICE VISIT (OUTPATIENT)
Dept: HEMATOLOGY ONCOLOGY | Facility: CLINIC | Age: 59
End: 2019-09-13
Payer: COMMERCIAL

## 2019-09-13 VITALS
HEIGHT: 66 IN | HEART RATE: 70 BPM | TEMPERATURE: 98.3 F | OXYGEN SATURATION: 96 % | WEIGHT: 160 LBS | SYSTOLIC BLOOD PRESSURE: 115 MMHG | DIASTOLIC BLOOD PRESSURE: 76 MMHG | BODY MASS INDEX: 25.71 KG/M2 | RESPIRATION RATE: 18 BRPM

## 2019-09-13 DIAGNOSIS — C7A.8 NEUROENDOCRINE CANCER (HCC): Primary | ICD-10-CM

## 2019-09-13 DIAGNOSIS — C7B.02 METASTATIC MALIGNANT CARCINOID TUMOR TO LIVER (HCC): ICD-10-CM

## 2019-09-13 DIAGNOSIS — D3A.012 CARCINOID TUMOR OF ILEUM: Primary | ICD-10-CM

## 2019-09-13 DIAGNOSIS — R19.7 DIARRHEA, UNSPECIFIED TYPE: ICD-10-CM

## 2019-09-13 PROCEDURE — 99214 OFFICE O/P EST MOD 30 MIN: CPT | Performed by: INTERNAL MEDICINE

## 2019-09-13 NOTE — PROGRESS NOTES
HEMATOLOGY / ONCOLOGY CLINIC NOTE    Primary Care Provider: Jamie Chavez MD  Referring Provider:    MRN: [id number]  : 1960    Reason for Encounter:    Chief Complaint   Patient presents with    Follow-up         History of Hematology / Oncology Illness:     Marilyn Allred is a 63 y o  female who came in  to establish care with oncology    1, GI carcinoid tumor, T3N1M1  G1  - presented with diarrhea ongoing for 2 years  Colonoscopy showed polyps, biopsy showed GI carcinoid tumor     - Ki 67 less than 3 percent  Low to intermediate grade    -  PET-CT showed intra-abdominal lymphadenopathy and liver leison  - 2019 : s/p Right hemicolectomy, cholecystectomy, 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); Found Lymphovascular and perineural invasion are present;   T3N1M1  G1;  3/24 LN  -  :  Intraoperative  liver lesion ablation    Cancer marker all normal before surgery  Current treatment:  Sandostatin, every month  Assessment / Plan:         1  Carcinoid tumor of ileum  - patient now status post tumor resection, and liver ablation  Discussed with patient regarding the benefit for octreotide in this setting, made patient aware that if all lesions are surgically removed, usually will have to do adjuvant treatment  Given she has liver ablation, assume she may have residual cancer, we would like to continue Sandostatin treatment for additional 6 months to 1 year  Will follow patient in 3 months  Patient will need labs every 3 months  May consider to monitor cancer marker, however patient's preop cancer markers are in normal range             2  Diarrhea  -  resolved             Made patient aware regarding side effects of chemotherapy, including but not limited to fatigue cytopenia, increased risk for infection, potential kidney, liver injuries neuropathies et al    Made patient aware to call MD or go to ED for any fever,  Chills, bleeding, easy bruise, unhealed wound, chest pain, abdominal pain et al            25    minutes were spent face to face with patient with greater than 50% of the time spent in counseling or coordination of care including discussions of treatment instructions  All of the patient's questions were answered to their satisfactory during this discussion  Advised pt to call if there is any further questions  Interval History:      7/2/2019 :  Patient is a 60-year-old female, with history of depression, chronic diarrhea, recently diagnosed GI neuroendocrine tumor  [facility] for comanagement  Patient reported other than diarrhea, she has no flushing, no dyspnea, no abdominal pain  She has no other malignancies in the past, her diarrhea is about 3 to 4 times a day watery usually after meal   She has been evaluated by GI, her diarrhea is considered due to combination of IBS and carcinoid tumor  Patient is a nonsmoker, drinks alcohol socially  9/13/2019 :  Patient came in for follow-up  Reported after surgery has recovered very well, bowel movement back to normal   No fatigue, no sweating no flushing  Problem list:       Patient Active Problem List   Diagnosis    Chronic bilateral low back pain    Colon cancer screening    Depression    Scoliosis of thoracic spine    Right leg pain    Screening for colon cancer    Carcinoid tumor of ileum    Diarrhea    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 ([facility] [address] )    Other long term (current) drug therapy         PHYSICIAL EXAMINATION:     Vital Signs:   [unfilled]  Body mass index is 25 82 kg/m²  Body surface area is 1 82 meters squared  No major finding on physical examination  GEN: Alert, awake oriented x3, in no acute distress  HEENT- No pallor, icterus, cyanosis, no oral mucosal lesions,   LAD - no palpable cervical, clavicle, axillary, inguinal LAD  Heart- normal S1 S2, regular rate and rhythm, No murmur, rubs     Lungs- decreased breathing sound bilateral    Abdomen- soft, Non tender, bowel sounds present  Extremities- No cyanosis, clubbing, edema  Neuro- No focal neurological deficit           PAST MEDICAL HISTORY:   has a past medical history of Cancer [facility]), Psychiatric disorder, Scoliosis, Strep throat, and Weight gain  PAST SURGICAL HISTORY:   has a past surgical history that includes Partial hysterectomy; Tubal ligation; Hysterectomy; Breast biopsy; Breast lumpectomy; pr abltj 1/> lvr bandar prq rf (N/A, 8/22/2019); COLECTOMY TOTAL (Right, 8/22/2019); Cholecystectomy (N/A, 8/22/2019); and LAPAROTOMY (N/A, 8/22/2019)  CURRENT MEDICATIONS:     Current Outpatient Medications   Medication Sig Dispense Refill    Brexpiprazole (REXULTI) 0 5 MG tablet Take 0 5 mg by mouth daily at bedtime       citalopram (CeleXA) 20 mg tablet Take 1 tablet (20 mg total) by mouth daily (Patient taking differently: Take 20 mg by mouth daily at bedtime ) 30 tablet 2    octreotide (SandoSTATIN LAR DEPOT) 10 mg IM injection kit Inject 10 mg into a muscle every 28 days       No current facility-administered medications for this visit  [unfilled]    SOCIAL HISTORY:   reports that she has never smoked  She has never used smokeless tobacco  She reports that she drank alcohol  She reports that she has current or past drug history  FAMILY HISTORY:  family history includes Multiple myeloma (age of onset: 68) in her father; Thyroid cancer (age of onset: 76) in her mother  ALLERGIES:  is allergic to prednisone  REVIEW OF SYSTEMS:  Please note that a 14-point review of systems was performed to include Constitutional, HEENT, Respiratory, CVS, GI, , Musculoskeletal, Integumentary, Neurologic, Rheumatologic, Endocrinologic, Psychiatric, Lymphatic, and Hematologic/Oncologic systems were reviewed and are negative unless otherwise stated in HPI   Positive and negative findings pertinent to this evaluation are incorporated into the history of present illness  LAB:    Lab Results   Component Value Date    WBC 9 34 08/26/2019    HGB 8 0 (L) 08/26/2019    HCT 24 6 (L) 08/26/2019    MCV 94 08/26/2019     08/26/2019       Lab Results   Component Value Date    SODIUM 138 08/25/2019    K 4 0 08/25/2019     08/25/2019    CO2 26 08/25/2019    AGAP 7 08/25/2019    BUN 7 08/25/2019    CREATININE 0 57 (L) 08/25/2019    GLUC 140 08/25/2019    GLUF 125 (H) 08/12/2019    CALCIUM 8 5 08/25/2019    AST 19 08/12/2019    ALT 38 08/12/2019    ALKPHOS 74 08/12/2019    TP 7 3 08/12/2019    TBILI 0 20 08/12/2019    EGFR 103 08/25/2019       CBC with diff:         Invalid input(s):  RBC, TOTALCELLSCOUNTED, SEGS%, GRANS%, LYMPHS%, EOS%, BASO%, ABNEUT, ABGRANS, ABLYMPHS, ABMOMOS, ABEOS, ABBASO    CMP:        Invalid input(s): ALBUMIN    IMAGING:    No orders to display     Ct Small Bowel Enterography    Result Date: 6/20/2019  Narrative: CT ABDOMEN AND PELVIS WITH IV CONTRAST- ENTEROGRAPHY - WITH CONTRAST INDICATION:   D3A 012: Benign carcinoid tumor of the ileum  COMPARISON:  None  TECHNIQUE:  Contrast-enhanced CT examination of the abdomen and pelvis was performed utilizing thin section technique and after the administration of low density enteric contrast according to protocol designed specifically to obtain sensitive evaluation of the small bowel  Axial, sagittal, and coronal 2D reformatted images were created from the source data and submitted for interpretation  Radiation dose length product (DLP) for this visit:  374 7 mGy-cm   This examination, like all CT scans performed in the [facility], was performed utilizing techniques to minimize radiation dose exposure, including the use of iterative reconstruction and automated exposure control  IV Contrast:  100 mL of iohexol (OMNIPAQUE) Enteric Contrast:  1500 cc of VoLumen enteric contrast was administered   FINDINGS: ABDOMEN SMALL BOWEL: There is an enhancing nodule in the terminal ileum, adjacent to the ileocecal valve, measuring 1 9 cm  Presumably this corresponds with the known neoplasm  There is no small bowel wall thickening or obstruction  LARGE BOWEL:  Normal caliber  No focal wall thickening or pericolonic inflammatory change  There is sigmoid diverticulosis without diverticulitis  STOMACH:  Unremarkable  LOWER CHEST:  No clinically significant abnormality identified in the visualized lower chest  LIVER/BILIARY TREE:  Ill-defined enhancing lesion in the right hepatic lobe posterior segment measuring approximately 2 3 x 1 6 cm on image 2/42  Additional 1 2 cm enhancing focus at the lateral margin of the right hepatic lobe at the same level, on image 2/37  Additional enhancing lesions in the posterior segment more inferiorly, measuring 1 2 x 1 4 cm on image 2/59, and 1 8 x 1 7 cm on image 2/79  GALLBLADDER:  No calcified gallstones  No pericholecystic inflammatory change  SPLEEN:  Unremarkable  PANCREAS:  Unremarkable  ADRENAL GLANDS:  Unremarkable  KIDNEYS/URETERS:  Indeterminate 1 0 cm hypodense nodule at the lower pole of the right kidney posteriorly on image 2/106  No hydronephrosis or calculi  ABDOMINOPELVIC CAVITY: Enhancing nodule with possible necrotic component in the right lower quadrant mesentery, measuring 2 6 x 1 9 cm in short axis, suspicious for metastatic adenopathy  No ascites  No pneumoperitoneum  VESSELS:  Unremarkable for patient's age  PELVIS REPRODUCTIVE ORGANS:  Patient is status post hysterectomy  URINARY BLADDER:  Unremarkable  ABDOMINAL WALL/INGUINAL REGIONS:  Unremarkable  OSSEOUS STRUCTURES:  No acute fracture or destructive osseous lesion  Impression: 1 9 cm nodule in the terminal ileum, presumably corresponding with known neoplasm  There is a 2 6 cm right lower quadrant mesenteric nodule which is suspicious for metastatic disease  Multiple enhancing lesions in the right hepatic lobe, concerning for metastatic disease    Suggest hepatic protocol MRI for further evaluation  Indeterminate 1 0 cm hypodense right renal nodule  Suggest ultrasound for further evaluation  The study was marked in EPIC for significant notification  Workstation performed: [id number]     Nm Pet Ct Skull Base To Mid Thigh    Result Date: 6/24/2019  Narrative: PET/CT SCAN INDICATION: Suspected carcinoid tumor of the terminal ileum  Initial staging  D3A 012: Benign carcinoid tumor of the ileum MODIFIER: PI COMPARISON: CT small bowel enterography 6/18/2019 CELL TYPE:  preliminary pathology of minute group of epithelial cells with neuroendocrine differentiation of uncertain significance (bx terminal ileum polyp 6/7/19) TECHNIQUE:   8 6 mCi F-18-FDG administered IV  Multiplanar attenuation corrected and non attenuation corrected PET images are available for interpretation, and contiguous, low dose, axial CT sections were obtained from the skull base through the femurs   Intravenous contrast material was not utilized  This examination, like all CT scans performed in the [facility], was performed utilizing techniques to minimize radiation dose exposure, including the use of iterative reconstruction and automated exposure control  Fasting serum glucose: 95 mg/dl FINDINGS: VISUALIZED BRAIN:   No acute abnormalities are seen  HEAD/NECK:   There is a physiologic distribution of FDG  Fairly symmetric FDG uptake in Waldeyer's ring is likely physiologic  No FDG avid cervical adenopathy is seen  CT images: Unremarkable  CHEST:   No FDG avid soft tissue lesions are seen  CT images: Unremarkable  ABDOMEN/PELVIS:  Mild focal FDG uptake at the terminal ileum, SUV max of 3 1 where there is a known lesion on prior CT  The activity is similar to normal bowel activity  Scattered vague hypodense lesions in the liver, not FDG avid above liver background, SUV max of 3 1  These are better seen on the prior contrast CT where they were hyperdense and enhancing   Enlarged right mesenteric lymph node again noted, with mild FDG uptake, SUV max of 3 0  This measures 2 6 x 2 0 cm image 179 series 3  Linear FDG uptake at the right hip may be physiologic or related to tendinitis  CT images: Otherwise unremarkable  OSSEOUS STRUCTURES: No FDG avid lesions are seen  CT images: S-shaped scoliosis noted of the thoracolumbar spine  Scattered multilevel degenerative changes of the spine  Impression: 1  Mild FDG uptake in the region of the known terminal ileal lesion and in the right mesenteric lymph node  Findings favor low-grade neoplasm  2  The scattered hepatic lesions do not demonstrate significant FDG uptake above liver background   3   Given the pathology findings of suspected carcinoid disease, gallium-68 Dotatate scan may be a more sensitive test  Workstation performed: [id number]

## 2019-09-16 ENCOUNTER — HOSPITAL ENCOUNTER (OUTPATIENT)
Dept: INFUSION CENTER | Facility: CLINIC | Age: 59
Discharge: HOME | End: 2019-09-16
Payer: COMMERCIAL

## 2019-09-16 DIAGNOSIS — D3A.012 CARCINOID TUMOR OF ILEUM: Primary | ICD-10-CM

## 2019-09-16 LAB
ALBUMIN SERPL BCP-MCNC: 4 G/DL (ref 3.5–5)
ALP SERPL-CCNC: 132 U/L (ref 46–116)
ALT SERPL W P-5'-P-CCNC: 56 U/L (ref 12–78)
ANION GAP SERPL CALCULATED.3IONS-SCNC: 11 MMOL/L (ref 4–13)
AST SERPL W P-5'-P-CCNC: 28 U/L (ref 5–45)
BASOPHILS # BLD AUTO: 0.09 THOUSANDS/ΜL (ref 0–0.1)
BASOPHILS NFR BLD AUTO: 1 % (ref 0–1)
BILIRUB SERPL-MCNC: 0.4 MG/DL (ref 0.2–1)
BUN SERPL-MCNC: 14 MG/DL (ref 5–25)
CALCIUM SERPL-MCNC: 9.9 MG/DL (ref 8.3–10.1)
CHLORIDE SERPL-SCNC: 101 MMOL/L (ref 100–108)
CO2 SERPL-SCNC: 24 MMOL/L (ref 21–32)
CREAT SERPL-MCNC: 0.77 MG/DL (ref 0.6–1.3)
EOSINOPHIL # BLD AUTO: 1.63 THOUSAND/ΜL (ref 0–0.61)
EOSINOPHIL NFR BLD AUTO: 18 % (ref 0–6)
ERYTHROCYTE [DISTWIDTH] IN BLOOD BY AUTOMATED COUNT: 12.7 % (ref 11.6–15.1)
GFR SERPL CREATININE-BSD FRML MDRD: 86 ML/MIN/1.73SQ M
GLUCOSE SERPL-MCNC: 130 MG/DL (ref 65–140)
HCT VFR BLD AUTO: 35.9 % (ref 34.8–46.1)
HGB BLD-MCNC: 11.9 G/DL (ref 11.5–15.4)
IMM GRANULOCYTES # BLD AUTO: 0.04 THOUSAND/UL (ref 0–0.2)
IMM GRANULOCYTES NFR BLD AUTO: 0 % (ref 0–2)
LYMPHOCYTES # BLD AUTO: 1.86 THOUSANDS/ΜL (ref 0.6–4.47)
LYMPHOCYTES NFR BLD AUTO: 20 % (ref 14–44)
MCH RBC QN AUTO: 30.7 PG (ref 26.8–34.3)
MCHC RBC AUTO-ENTMCNC: 33.1 G/DL (ref 31.4–37.4)
MCV RBC AUTO: 93 FL (ref 82–98)
MONOCYTES # BLD AUTO: 0.51 THOUSAND/ΜL (ref 0.17–1.22)
MONOCYTES NFR BLD AUTO: 6 % (ref 4–12)
NEUTROPHILS # BLD AUTO: 5.03 THOUSANDS/ΜL (ref 1.85–7.62)
NEUTS SEG NFR BLD AUTO: 55 % (ref 43–75)
NRBC BLD AUTO-RTO: 0 /100 WBCS
PLATELET # BLD AUTO: 314 THOUSANDS/UL (ref 149–390)
PMV BLD AUTO: 10.2 FL (ref 8.9–12.7)
POTASSIUM SERPL-SCNC: 4 MMOL/L (ref 3.5–5.3)
PROT SERPL-MCNC: 8 G/DL (ref 6.4–8.2)
RBC # BLD AUTO: 3.88 MILLION/UL (ref 3.81–5.12)
SODIUM SERPL-SCNC: 136 MMOL/L (ref 136–145)
WBC # BLD AUTO: 9.16 THOUSAND/UL (ref 4.31–10.16)

## 2019-09-16 PROCEDURE — 84307 ASSAY OF SOMATOSTATIN: CPT

## 2019-09-16 PROCEDURE — 36415 COLL VENOUS BLD VENIPUNCTURE: CPT

## 2019-09-16 PROCEDURE — 96372 THER/PROPH/DIAG INJ SC/IM: CPT

## 2019-09-16 PROCEDURE — 80053 COMPREHEN METABOLIC PANEL: CPT

## 2019-09-16 PROCEDURE — 86316 IMMUNOASSAY TUMOR OTHER: CPT

## 2019-09-16 PROCEDURE — 85025 COMPLETE CBC W/AUTO DIFF WBC: CPT

## 2019-09-16 RX ADMIN — OCTREOTIDE ACETATE 30 MG: KIT at 09:21

## 2019-09-16 NOTE — PROGRESS NOTES
Pt here for monthly Sandostatin- offers no complaints  Sandostatin given in R gluteal site without any issues  Confirmed with Phoebe Mcdowell RN- pt to still obtain monthly labs- peripheral labs drawn and sent to lab  Pt aware of next appointment   Declines AVS

## 2019-09-18 LAB — CGA SERPL-SCNC: <1 NMOL/L (ref 0–5)

## 2019-10-10 ENCOUNTER — APPOINTMENT (OUTPATIENT)
Dept: LAB | Facility: HOSPITAL | Age: 59
End: 2019-10-10
Attending: INTERNAL MEDICINE
Payer: COMMERCIAL

## 2019-10-10 DIAGNOSIS — C7A.8 NEUROENDOCRINE CANCER (HCC): ICD-10-CM

## 2019-10-10 LAB
ALBUMIN SERPL BCP-MCNC: 4.3 G/DL (ref 3.5–5)
ALP SERPL-CCNC: 104 U/L (ref 46–116)
ALT SERPL W P-5'-P-CCNC: 126 U/L (ref 12–78)
ANION GAP SERPL CALCULATED.3IONS-SCNC: 10 MMOL/L (ref 4–13)
AST SERPL W P-5'-P-CCNC: 53 U/L (ref 5–45)
BASOPHILS # BLD AUTO: 0.08 THOUSANDS/ΜL (ref 0–0.1)
BASOPHILS NFR BLD AUTO: 1 % (ref 0–1)
BILIRUB SERPL-MCNC: 0.3 MG/DL (ref 0.2–1)
BUN SERPL-MCNC: 17 MG/DL (ref 5–25)
CALCIUM SERPL-MCNC: 9.4 MG/DL (ref 8.3–10.1)
CHLORIDE SERPL-SCNC: 102 MMOL/L (ref 100–108)
CO2 SERPL-SCNC: 26 MMOL/L (ref 21–32)
CREAT SERPL-MCNC: 0.86 MG/DL (ref 0.6–1.3)
EOSINOPHIL # BLD AUTO: 0.34 THOUSAND/ΜL (ref 0–0.61)
EOSINOPHIL NFR BLD AUTO: 4 % (ref 0–6)
ERYTHROCYTE [DISTWIDTH] IN BLOOD BY AUTOMATED COUNT: 12.5 % (ref 11.6–15.1)
GFR SERPL CREATININE-BSD FRML MDRD: 75 ML/MIN/1.73SQ M
GLUCOSE SERPL-MCNC: 110 MG/DL (ref 65–140)
HCT VFR BLD AUTO: 35.3 % (ref 34.8–46.1)
HGB BLD-MCNC: 11.6 G/DL (ref 11.5–15.4)
IMM GRANULOCYTES # BLD AUTO: 0.06 THOUSAND/UL (ref 0–0.2)
IMM GRANULOCYTES NFR BLD AUTO: 1 % (ref 0–2)
LYMPHOCYTES # BLD AUTO: 2.84 THOUSANDS/ΜL (ref 0.6–4.47)
LYMPHOCYTES NFR BLD AUTO: 29 % (ref 14–44)
MCH RBC QN AUTO: 30.3 PG (ref 26.8–34.3)
MCHC RBC AUTO-ENTMCNC: 32.9 G/DL (ref 31.4–37.4)
MCV RBC AUTO: 92 FL (ref 82–98)
MONOCYTES # BLD AUTO: 0.61 THOUSAND/ΜL (ref 0.17–1.22)
MONOCYTES NFR BLD AUTO: 6 % (ref 4–12)
NEUTROPHILS # BLD AUTO: 5.88 THOUSANDS/ΜL (ref 1.85–7.62)
NEUTS SEG NFR BLD AUTO: 59 % (ref 43–75)
NRBC BLD AUTO-RTO: 0 /100 WBCS
PLATELET # BLD AUTO: 306 THOUSANDS/UL (ref 149–390)
PMV BLD AUTO: 10.5 FL (ref 8.9–12.7)
POTASSIUM SERPL-SCNC: 4.2 MMOL/L (ref 3.5–5.3)
PROT SERPL-MCNC: 7.6 G/DL (ref 6.4–8.2)
RBC # BLD AUTO: 3.83 MILLION/UL (ref 3.81–5.12)
SODIUM SERPL-SCNC: 138 MMOL/L (ref 136–145)
WBC # BLD AUTO: 9.81 THOUSAND/UL (ref 4.31–10.16)

## 2019-10-10 PROCEDURE — 86316 IMMUNOASSAY TUMOR OTHER: CPT

## 2019-10-10 PROCEDURE — 85025 COMPLETE CBC W/AUTO DIFF WBC: CPT

## 2019-10-10 PROCEDURE — 84307 ASSAY OF SOMATOSTATIN: CPT

## 2019-10-10 PROCEDURE — 36415 COLL VENOUS BLD VENIPUNCTURE: CPT

## 2019-10-10 PROCEDURE — 80053 COMPREHEN METABOLIC PANEL: CPT

## 2019-10-14 ENCOUNTER — HOSPITAL ENCOUNTER (OUTPATIENT)
Dept: INFUSION CENTER | Facility: CLINIC | Age: 59
Discharge: HOME | End: 2019-10-14
Payer: COMMERCIAL

## 2019-10-14 VITALS
RESPIRATION RATE: 22 BRPM | TEMPERATURE: 97.6 F | HEART RATE: 62 BPM | SYSTOLIC BLOOD PRESSURE: 132 MMHG | DIASTOLIC BLOOD PRESSURE: 69 MMHG

## 2019-10-14 DIAGNOSIS — D3A.012 CARCINOID TUMOR OF ILEUM: Primary | ICD-10-CM

## 2019-10-14 LAB — CGA SERPL-SCNC: <1 NMOL/L (ref 0–5)

## 2019-10-14 PROCEDURE — 96372 THER/PROPH/DIAG INJ SC/IM: CPT

## 2019-10-14 RX ADMIN — OCTREOTIDE ACETATE 30 MG: KIT at 15:41

## 2019-10-14 NOTE — PROGRESS NOTES
Patient here today for sandostatin injection, pateint offers no complaints at this time vitals are stable

## 2019-10-14 NOTE — PROGRESS NOTES
Patient tolerated injection well and without incident, in the left glute, patient refused avs and is aware of next apt pt d/c in stable condition

## 2019-10-14 NOTE — PLAN OF CARE
Problem: Potential for Falls  Goal: Patient will remain free of falls  Description  INTERVENTIONS:  - Assess patient frequently for physical needs  -  Identify cognitive and physical deficits and behaviors that affect risk of falls  -  Mott fall precautions as indicated by assessment   - Educate patient/family on patient safety including physical limitations  - Instruct patient to call for assistance with activity based on assessment  - Modify environment to reduce risk of injury  - Consider OT/PT consult to assist with strengthening/mobility  Outcome: Progressing     Problem: Knowledge Deficit  Goal: Patient/family/caregiver demonstrates understanding of disease process, treatment plan, medications, and discharge instructions  Description  Complete learning assessment and assess knowledge base    Interventions:  - Provide teaching at level of understanding  - Provide teaching via preferred learning methods  Outcome: Progressing

## 2019-10-28 LAB — SCAN RESULT: NORMAL

## 2019-11-11 ENCOUNTER — HOSPITAL ENCOUNTER (OUTPATIENT)
Dept: INFUSION CENTER | Facility: CLINIC | Age: 59
Discharge: HOME | End: 2019-11-11
Payer: COMMERCIAL

## 2019-11-11 ENCOUNTER — APPOINTMENT (OUTPATIENT)
Dept: LAB | Facility: HOSPITAL | Age: 59
End: 2019-11-11
Attending: INTERNAL MEDICINE
Payer: COMMERCIAL

## 2019-11-11 DIAGNOSIS — D3A.012 CARCINOID TUMOR OF ILEUM: Primary | ICD-10-CM

## 2019-11-11 DIAGNOSIS — D3A.012 CARCINOID TUMOR OF ILEUM, UNSPECIFIED WHETHER MALIGNANT: ICD-10-CM

## 2019-11-11 LAB
ALBUMIN SERPL BCP-MCNC: 3.9 G/DL (ref 3.5–5)
ALP SERPL-CCNC: 116 U/L (ref 46–116)
ALT SERPL W P-5'-P-CCNC: 107 U/L (ref 12–78)
ANION GAP SERPL CALCULATED.3IONS-SCNC: 9 MMOL/L (ref 4–13)
AST SERPL W P-5'-P-CCNC: 36 U/L (ref 5–45)
BASOPHILS # BLD AUTO: 0.08 THOUSANDS/ΜL (ref 0–0.1)
BASOPHILS NFR BLD AUTO: 1 % (ref 0–1)
BILIRUB SERPL-MCNC: 0.3 MG/DL (ref 0.2–1)
BUN SERPL-MCNC: 13 MG/DL (ref 5–25)
CALCIUM SERPL-MCNC: 9.3 MG/DL (ref 8.3–10.1)
CHLORIDE SERPL-SCNC: 106 MMOL/L (ref 100–108)
CO2 SERPL-SCNC: 27 MMOL/L (ref 21–32)
CREAT SERPL-MCNC: 0.68 MG/DL (ref 0.6–1.3)
EOSINOPHIL # BLD AUTO: 0.33 THOUSAND/ΜL (ref 0–0.61)
EOSINOPHIL NFR BLD AUTO: 4 % (ref 0–6)
ERYTHROCYTE [DISTWIDTH] IN BLOOD BY AUTOMATED COUNT: 12.5 % (ref 11.6–15.1)
GFR SERPL CREATININE-BSD FRML MDRD: 97 ML/MIN/1.73SQ M
GLUCOSE SERPL-MCNC: 79 MG/DL (ref 65–140)
HCT VFR BLD AUTO: 40 % (ref 34.8–46.1)
HGB BLD-MCNC: 13.3 G/DL (ref 11.5–15.4)
IMM GRANULOCYTES # BLD AUTO: 0.04 THOUSAND/UL (ref 0–0.2)
IMM GRANULOCYTES NFR BLD AUTO: 0 % (ref 0–2)
LYMPHOCYTES # BLD AUTO: 2.45 THOUSANDS/ΜL (ref 0.6–4.47)
LYMPHOCYTES NFR BLD AUTO: 26 % (ref 14–44)
MCH RBC QN AUTO: 30.5 PG (ref 26.8–34.3)
MCHC RBC AUTO-ENTMCNC: 33.3 G/DL (ref 31.4–37.4)
MCV RBC AUTO: 92 FL (ref 82–98)
MONOCYTES # BLD AUTO: 0.56 THOUSAND/ΜL (ref 0.17–1.22)
MONOCYTES NFR BLD AUTO: 6 % (ref 4–12)
NEUTROPHILS # BLD AUTO: 5.97 THOUSANDS/ΜL (ref 1.85–7.62)
NEUTS SEG NFR BLD AUTO: 63 % (ref 43–75)
NRBC BLD AUTO-RTO: 0 /100 WBCS
PLATELET # BLD AUTO: 295 THOUSANDS/UL (ref 149–390)
PMV BLD AUTO: 10.9 FL (ref 8.9–12.7)
POTASSIUM SERPL-SCNC: 3.7 MMOL/L (ref 3.5–5.3)
PROT SERPL-MCNC: 7.5 G/DL (ref 6.4–8.2)
RBC # BLD AUTO: 4.36 MILLION/UL (ref 3.81–5.12)
SODIUM SERPL-SCNC: 142 MMOL/L (ref 136–145)
WBC # BLD AUTO: 9.43 THOUSAND/UL (ref 4.31–10.16)

## 2019-11-11 PROCEDURE — 80053 COMPREHEN METABOLIC PANEL: CPT | Performed by: PHYSICIAN ASSISTANT

## 2019-11-11 PROCEDURE — 36415 COLL VENOUS BLD VENIPUNCTURE: CPT | Performed by: PHYSICIAN ASSISTANT

## 2019-11-11 PROCEDURE — 84307 ASSAY OF SOMATOSTATIN: CPT | Performed by: PHYSICIAN ASSISTANT

## 2019-11-11 PROCEDURE — 86316 IMMUNOASSAY TUMOR OTHER: CPT | Performed by: PHYSICIAN ASSISTANT

## 2019-11-11 PROCEDURE — 96372 THER/PROPH/DIAG INJ SC/IM: CPT

## 2019-11-11 PROCEDURE — 85025 COMPLETE CBC W/AUTO DIFF WBC: CPT | Performed by: PHYSICIAN ASSISTANT

## 2019-11-11 RX ADMIN — OCTREOTIDE ACETATE 30 MG: KIT at 14:52

## 2019-11-11 NOTE — PROGRESS NOTES
Pt presents for monthly Sandostatin, injection given in R gluteus, tolerated well  Pt offers no complaints  AVS declined, next appointment confirmed

## 2019-11-13 LAB — CGA SERPL-SCNC: <1 NMOL/L (ref 0–5)

## 2019-11-29 LAB — SCAN RESULT: NORMAL

## 2019-12-04 ENCOUNTER — APPOINTMENT (OUTPATIENT)
Dept: LAB | Facility: HOSPITAL | Age: 59
End: 2019-12-04
Attending: INTERNAL MEDICINE
Payer: COMMERCIAL

## 2019-12-04 DIAGNOSIS — C7A.8 NEUROENDOCRINE CANCER (HCC): ICD-10-CM

## 2019-12-04 LAB
ALBUMIN SERPL BCP-MCNC: 4.1 G/DL (ref 3.5–5)
ALP SERPL-CCNC: 108 U/L (ref 46–116)
ALT SERPL W P-5'-P-CCNC: 205 U/L (ref 12–78)
ANION GAP SERPL CALCULATED.3IONS-SCNC: 9 MMOL/L (ref 4–13)
AST SERPL W P-5'-P-CCNC: 72 U/L (ref 5–45)
BASOPHILS # BLD AUTO: 0.06 THOUSANDS/ΜL (ref 0–0.1)
BASOPHILS NFR BLD AUTO: 1 % (ref 0–1)
BILIRUB SERPL-MCNC: 0.3 MG/DL (ref 0.2–1)
BUN SERPL-MCNC: 22 MG/DL (ref 5–25)
CALCIUM SERPL-MCNC: 9.2 MG/DL (ref 8.3–10.1)
CHLORIDE SERPL-SCNC: 103 MMOL/L (ref 100–108)
CO2 SERPL-SCNC: 26 MMOL/L (ref 21–32)
CREAT SERPL-MCNC: 1.17 MG/DL (ref 0.6–1.3)
EOSINOPHIL # BLD AUTO: 0.34 THOUSAND/ΜL (ref 0–0.61)
EOSINOPHIL NFR BLD AUTO: 4 % (ref 0–6)
ERYTHROCYTE [DISTWIDTH] IN BLOOD BY AUTOMATED COUNT: 12.3 % (ref 11.6–15.1)
GFR SERPL CREATININE-BSD FRML MDRD: 51 ML/MIN/1.73SQ M
GLUCOSE SERPL-MCNC: 102 MG/DL (ref 65–140)
HCT VFR BLD AUTO: 37.3 % (ref 34.8–46.1)
HGB BLD-MCNC: 12.6 G/DL (ref 11.5–15.4)
IMM GRANULOCYTES # BLD AUTO: 0.05 THOUSAND/UL (ref 0–0.2)
IMM GRANULOCYTES NFR BLD AUTO: 1 % (ref 0–2)
LYMPHOCYTES # BLD AUTO: 2.3 THOUSANDS/ΜL (ref 0.6–4.47)
LYMPHOCYTES NFR BLD AUTO: 27 % (ref 14–44)
MCH RBC QN AUTO: 30.4 PG (ref 26.8–34.3)
MCHC RBC AUTO-ENTMCNC: 33.8 G/DL (ref 31.4–37.4)
MCV RBC AUTO: 90 FL (ref 82–98)
MONOCYTES # BLD AUTO: 0.48 THOUSAND/ΜL (ref 0.17–1.22)
MONOCYTES NFR BLD AUTO: 6 % (ref 4–12)
NEUTROPHILS # BLD AUTO: 5.27 THOUSANDS/ΜL (ref 1.85–7.62)
NEUTS SEG NFR BLD AUTO: 61 % (ref 43–75)
NRBC BLD AUTO-RTO: 0 /100 WBCS
PLATELET # BLD AUTO: 253 THOUSANDS/UL (ref 149–390)
PMV BLD AUTO: 10.9 FL (ref 8.9–12.7)
POTASSIUM SERPL-SCNC: 3.8 MMOL/L (ref 3.5–5.3)
PROT SERPL-MCNC: 7.7 G/DL (ref 6.4–8.2)
RBC # BLD AUTO: 4.14 MILLION/UL (ref 3.81–5.12)
SODIUM SERPL-SCNC: 138 MMOL/L (ref 136–145)
WBC # BLD AUTO: 8.5 THOUSAND/UL (ref 4.31–10.16)

## 2019-12-04 PROCEDURE — 86316 IMMUNOASSAY TUMOR OTHER: CPT

## 2019-12-04 PROCEDURE — 85025 COMPLETE CBC W/AUTO DIFF WBC: CPT

## 2019-12-04 PROCEDURE — 84307 ASSAY OF SOMATOSTATIN: CPT

## 2019-12-04 PROCEDURE — 80053 COMPREHEN METABOLIC PANEL: CPT

## 2019-12-04 PROCEDURE — 36415 COLL VENOUS BLD VENIPUNCTURE: CPT

## 2019-12-05 ENCOUNTER — HOSPITAL ENCOUNTER (OUTPATIENT)
Dept: CT IMAGING | Facility: HOSPITAL | Age: 59
Discharge: HOME | End: 2019-12-05
Attending: SURGERY
Payer: COMMERCIAL

## 2019-12-05 DIAGNOSIS — D3A.012 CARCINOID TUMOR OF ILEUM: ICD-10-CM

## 2019-12-05 PROCEDURE — 74177 CT ABD & PELVIS W/CONTRAST: CPT

## 2019-12-05 RX ADMIN — IOHEXOL 100 ML: 350 INJECTION, SOLUTION INTRAVENOUS at 16:04

## 2019-12-06 LAB — CGA SERPL-SCNC: <1 NMOL/L (ref 0–5)

## 2019-12-09 ENCOUNTER — HOSPITAL ENCOUNTER (OUTPATIENT)
Dept: INFUSION CENTER | Facility: CLINIC | Age: 59
Discharge: HOME | End: 2019-12-09
Payer: COMMERCIAL

## 2019-12-09 VITALS
DIASTOLIC BLOOD PRESSURE: 69 MMHG | RESPIRATION RATE: 18 BRPM | HEART RATE: 63 BPM | TEMPERATURE: 97.7 F | SYSTOLIC BLOOD PRESSURE: 108 MMHG

## 2019-12-09 DIAGNOSIS — D3A.012 CARCINOID TUMOR OF ILEUM, UNSPECIFIED WHETHER MALIGNANT: Primary | ICD-10-CM

## 2019-12-09 PROCEDURE — 96372 THER/PROPH/DIAG INJ SC/IM: CPT

## 2019-12-09 RX ADMIN — OCTREOTIDE ACETATE 30 MG: KIT at 14:56

## 2019-12-09 NOTE — PROGRESS NOTES
I notified Cody Hector RN at Dr Romeo Santa office of patient's elevated AST and ALT  No changes  She said okay to proceed with sandostatin today

## 2019-12-19 ENCOUNTER — OFFICE VISIT (OUTPATIENT)
Dept: SURGICAL ONCOLOGY | Facility: CLINIC | Age: 59
End: 2019-12-19
Payer: COMMERCIAL

## 2019-12-19 ENCOUNTER — OFFICE VISIT (OUTPATIENT)
Dept: HEMATOLOGY ONCOLOGY | Facility: CLINIC | Age: 59
End: 2019-12-19
Payer: COMMERCIAL

## 2019-12-19 VITALS
OXYGEN SATURATION: 96 % | BODY MASS INDEX: 25.88 KG/M2 | WEIGHT: 161 LBS | DIASTOLIC BLOOD PRESSURE: 80 MMHG | TEMPERATURE: 98.5 F | SYSTOLIC BLOOD PRESSURE: 112 MMHG | HEART RATE: 67 BPM | RESPIRATION RATE: 16 BRPM | HEIGHT: 66 IN

## 2019-12-19 DIAGNOSIS — C7B.02 METASTATIC MALIGNANT CARCINOID TUMOR TO LIVER (HCC): ICD-10-CM

## 2019-12-19 DIAGNOSIS — D3A.012 CARCINOID TUMOR OF ILEUM, UNSPECIFIED WHETHER MALIGNANT: Primary | ICD-10-CM

## 2019-12-19 PROCEDURE — 99214 OFFICE O/P EST MOD 30 MIN: CPT | Performed by: INTERNAL MEDICINE

## 2019-12-19 PROCEDURE — 99214 OFFICE O/P EST MOD 30 MIN: CPT | Performed by: SURGERY

## 2019-12-19 NOTE — PROGRESS NOTES
HEMATOLOGY / ONCOLOGY CLINIC NOTE    Primary Care Provider: Joey Smith MD  Referring Provider:    MRN: [id number]  : 1960    Reason for Encounter:    Chief Complaint   Patient presents with    Follow-up     3 month          History of Hematology / Oncology Illness:     Mihir Baumann is a 63 y o  female who came in  to establish care with oncology    1, GI carcinoid tumor, T3N1M1  G1  - presented with diarrhea ongoing for 2 years  Colonoscopy showed polyps, biopsy showed GI carcinoid tumor     - Ki 67 less than 3 percent  Low to intermediate grade    -  PET-CT showed intra-abdominal lymphadenopathy and liver leison  - 2019 : s/p Right hemicolectomy, cholecystectomy, 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); Found Lymphovascular and perineural invasion are present;   T3N1M1  G1;  3/24 LN  -  :  Intraoperative  liver lesion ablation    Cancer marker all normal before surgery    Current treatment:  Sandostatin, every month started in 10/2019             Assessment / Plan:         1  Carcinoid tumor of ileum  -  has received octreotide for 3 times, tolerating okay, no abdominal pain, noticed a slight elevation of liver enzymes, however CT scan and the cancer marker all negative  Will continue monitor labs by monthly basis, repeat another CT in 3 months, will follow in 3 months               2  Diarrhea  -  resolved             Made patient aware regarding side effects of chemotherapy, including but not limited to fatigue cytopenia, increased risk for infection, potential kidney, liver injuries neuropathies et al    Made patient aware to call MD or go to ED for any fever,  Chills, bleeding, easy bruise, unhealed wound, chest pain, abdominal pain et al            25    minutes were spent face to face with patient with greater than 50% of the time spent in counseling or coordination of care including discussions of treatment instructions    All of the patient's questions were answered to their satisfactory during this discussion  Advised pt to call if there is any further questions  Interval History:      7/2/2019 :  Patient is a 15-year-old female, with history of depression, chronic diarrhea, recently diagnosed GI neuroendocrine tumor  [facility] for comagement  Patient reported other than diarrhea, she has no flushing, no dyspnea, no abdominal pain  She has no other malignancies in the past, her diarrhea is about 3 to 4 times a day watery usually after meal   She has been evaluated by GI, her diarrhea is considered due to combination of IBS and carcinoid tumor  Patient is a nonsmoker, drinks alcohol socially  9/13/2019 :  Patient came in for follow-up  Reported after surgery has recovered very well, bowel movement back to normal   No fatigue, no sweating no flushing  12/19/2019 :  Came for follow-up  Reported doing okay, has some very mild intermittent right upper quadrant abdominal pain however normal skin color no nausea vomiting no diarrhea  Tolerating injection, no other issues  Problem list:       Patient Active Problem List   Diagnosis    Chronic bilateral low back pain    Colon cancer screening    Depression    Scoliosis of thoracic spine    Right leg pain    Screening for colon cancer    Carcinoid tumor of ileum    Diarrhea    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 ([address] )    Other long term (current) drug therapy         PHYSICIAL EXAMINATION:     Vital Signs:   [unfilled]  Body mass index is 25 99 kg/m²  Body surface area is 1 82 meters squared  No major finding on physical examination  GEN: Alert, awake oriented x3, in no acute distress  HEENT- No pallor, icterus, cyanosis, no oral mucosal lesions,   LAD - no palpable cervical, clavicle, axillary, inguinal LAD  Heart- normal S1 S2, regular rate and rhythm, No murmur, rubs     Lungs- decreased breathing sound bilateral    Abdomen- soft, Non tender, bowel sounds present  Extremities- No cyanosis, clubbing, edema  Neuro- No focal neurological deficit           PAST MEDICAL HISTORY:   has a past medical history of Cancer [facility]), Psychiatric disorder, Scoliosis, Strep throat, and Weight gain  PAST SURGICAL HISTORY:   has a past surgical history that includes Partial hysterectomy; Tubal ligation; Hysterectomy; Breast biopsy; Breast lumpectomy; pr abltj 1/> lvr bandar prq rf (N/A, 8/22/2019); COLECTOMY TOTAL (Right, 8/22/2019); Cholecystectomy (N/A, 8/22/2019); and LAPAROTOMY (N/A, 8/22/2019)  CURRENT MEDICATIONS:     Current Outpatient Medications   Medication Sig Dispense Refill    Brexpiprazole (REXULTI) 0 5 MG tablet Take 0 5 mg by mouth daily at bedtime       citalopram (CeleXA) 20 mg tablet Take 1 tablet (20 mg total) by mouth daily (Patient taking differently: Take 20 mg by mouth daily at bedtime ) 30 tablet 2    octreotide (SandoSTATIN LAR DEPOT) 10 mg IM injection kit Inject 10 mg into a muscle every 28 days       No current facility-administered medications for this visit  [unfilled]    SOCIAL HISTORY:   reports that she has never smoked  She has never used smokeless tobacco  She reports that she drank alcohol  She reports that she has current or past drug history  FAMILY HISTORY:  family history includes Multiple myeloma (age of onset: 68) in her father; Thyroid cancer (age of onset: 76) in her mother  ALLERGIES:  is allergic to prednisone  REVIEW OF SYSTEMS:  Please note that a 14-point review of systems was performed to include Constitutional, HEENT, Respiratory, CVS, GI, , Musculoskeletal, Integumentary, Neurologic, Rheumatologic, Endocrinologic, Psychiatric, Lymphatic, and Hematologic/Oncologic systems were reviewed and are negative unless otherwise stated in HPI  Positive and negative findings pertinent to this evaluation are incorporated into the history of present illness            Lab Re  Lab Results   Component Value Date    WBC 8 50 12/04/2019    HGB 12 6 12/04/2019    HCT 37 3 12/04/2019    MCV 90 12/04/2019     12/04/2019   sults   Component Value Date    WBC 9 34 08/26/2019    HGB 8 0 (L) 08/26/2019    HCT 24 6 (L) 08/26/2019    MCV 94 08/26/2019     08/26/2019      Component Value Date    SODIUM 138 12/04/2019    K 3 8 12/04/2019     12/04/2019    CO2 26 12/04/2019    AGAP 9 12/04/2019    BUN 22 12/04/2019    CREATININE 1 17 12/04/2019    GLUC 102 12/04/2019    GLUF 125 (H) 08/12/2019    CALCIUM 9 2 12/04/2019    AST 72 (H) 12/04/2019     (H) 12/04/2019    ALKPHOS 108 12/04/2019    TP 7 7 12/04/2019    TBILI 0 30 12/04/2019    EGFR 51 12/04/2019       CBC with diff:         Invalid input(s):  RBC, TOTALCELLSCOUNTED, SEGS%, GRANS%, LYMPHS%, EOS%, BASO%, ABNEUT, ABGRANS, ABLYMPHS, ABMOMOS, ABEOS, ABBASO    CMP:        Invalid input(s): ALBUMIN    IMAGING:    No orders to display     Ct Small Bowel Enterography    Result Date: 6/20/2019  Narrative: CT ABDOMEN AND PELVIS WITH IV CONTRAST- ENTEROGRAPHY - WITH CONTRAST INDICATION:   D3A 012: Benign carcinoid tumor of the ileum  COMPARISON:  None  TECHNIQUE:  Contrast-enhanced CT examination of the abdomen and pelvis was performed utilizing thin section technique and after the administration of low density enteric contrast according to protocol designed specifically to obtain sensitive evaluation of the small bowel  Axial, sagittal, and coronal 2D reformatted images were created from the source data and submitted for interpretation  Radiation dose length product (DLP) for this visit:  374 7 mGy-cm   This examination, like all CT scans performed in the [facility], was performed utilizing techniques to minimize radiation dose exposure, including the use of iterative reconstruction and automated exposure control  IV Contrast:  100 mL of iohexol (OMNIPAQUE) Enteric Contrast:  1500 cc of VoLumen enteric contrast was administered  FINDINGS: ABDOMEN SMALL BOWEL: There is an enhancing nodule in the terminal ileum, adjacent to the ileocecal valve, measuring 1 9 cm  Presumably this corresponds with the known neoplasm  There is no small bowel wall thickening or obstruction  LARGE BOWEL:  Normal caliber  No focal wall thickening or pericolonic inflammatory change  There is sigmoid diverticulosis without diverticulitis  STOMACH:  Unremarkable  LOWER CHEST:  No clinically significant abnormality identified in the visualized lower chest  LIVER/BILIARY TREE:  Ill-defined enhancing lesion in the right hepatic lobe posterior segment measuring approximately 2 3 x 1 6 cm on image 2/42  Additional 1 2 cm enhancing focus at the lateral margin of the right hepatic lobe at the same level, on image 2/37  Additional enhancing lesions in the posterior segment more inferiorly, measuring 1 2 x 1 4 cm on image 2/59, and 1 8 x 1 7 cm on image 2/79  GALLBLADDER:  No calcified gallstones  No pericholecystic inflammatory change  SPLEEN:  Unremarkable  PANCREAS:  Unremarkable  ADRENAL GLANDS:  Unremarkable  KIDNEYS/URETERS:  Indeterminate 1 0 cm hypodense nodule at the lower pole of the right kidney posteriorly on image 2/106  No hydronephrosis or calculi  ABDOMINOPELVIC CAVITY: Enhancing nodule with possible necrotic component in the right lower quadrant mesentery, measuring 2 6 x 1 9 cm in short axis, suspicious for metastatic adenopathy  No ascites  No pneumoperitoneum  VESSELS:  Unremarkable for patient's age  PELVIS REPRODUCTIVE ORGANS:  Patient is status post hysterectomy  URINARY BLADDER:  Unremarkable  ABDOMINAL WALL/INGUINAL REGIONS:  Unremarkable  OSSEOUS STRUCTURES:  No acute fracture or destructive osseous lesion  Impression: 1 9 cm nodule in the terminal ileum, presumably corresponding with known neoplasm  There is a 2 6 cm right lower quadrant mesenteric nodule which is suspicious for metastatic disease    Multiple enhancing lesions in the right hepatic lobe, concerning for metastatic disease  Suggest hepatic protocol MRI for further evaluation  Indeterminate 1 0 cm hypodense right renal nodule  Suggest ultrasound for further evaluation  The study was marked in EPIC for significant notification  Workstation performed: [id number]     Nm Pet Ct Skull Base To Mid Thigh    Result Date: 6/24/2019  Narrative: PET/CT SCAN INDICATION: Suspected carcinoid tumor of the terminal ileum  Initial staging  D3A 012: Benign carcinoid tumor of the ileum MODIFIER: PI COMPARISON: CT small bowel enterography 6/18/2019 CELL TYPE:  preliminary pathology of minute group of epithelial cells with neuroendocrine differentiation of uncertain significance (bx terminal ileum polyp 6/7/19) TECHNIQUE:   8 6 mCi F-18-FDG administered IV  Multiplanar attenuation corrected and non attenuation corrected PET images are available for interpretation, and contiguous, low dose, axial CT sections were obtained from the skull base through the femurs   Intravenous contrast material was not utilized  This examination, like all CT scans performed in the [facility], was performed utilizing techniques to minimize radiation dose exposure, including the use of iterative reconstruction and automated exposure control  Fasting serum glucose: 95 mg/dl FINDINGS: VISUALIZED BRAIN:   No acute abnormalities are seen  HEAD/NECK:   There is a physiologic distribution of FDG  Fairly symmetric FDG uptake in Waldeyer's ring is likely physiologic  No FDG avid cervical adenopathy is seen  CT images: Unremarkable  CHEST:   No FDG avid soft tissue lesions are seen  CT images: Unremarkable  ABDOMEN/PELVIS:  Mild focal FDG uptake at the terminal ileum, SUV max of 3 1 where there is a known lesion on prior CT  The activity is similar to normal bowel activity  Scattered vague hypodense lesions in the liver, not FDG avid above liver background, SUV max of 3 1    These are better seen on the prior contrast CT where they were hyperdense and enhancing  Enlarged right mesenteric lymph node again noted, with mild FDG uptake, SUV max of 3 0  This measures 2 6 x 2 0 cm image 179 series 3  Linear FDG uptake at the right hip may be physiologic or related to tendinitis  CT images: Otherwise unremarkable  OSSEOUS STRUCTURES: No FDG avid lesions are seen  CT images: S-shaped scoliosis noted of the thoracolumbar spine  Scattered multilevel degenerative changes of the spine  Impression: 1  Mild FDG uptake in the region of the known terminal ileal lesion and in the right mesenteric lymph node  Findings favor low-grade neoplasm  2  The scattered hepatic lesions do not demonstrate significant FDG uptake above liver background   3   Given the pathology findings of suspected carcinoid disease, gallium-68 Dotatate scan may be a more sensitive test  Workstation performed: [id number]

## 2019-12-19 NOTE — LETTER
December 19, 2019     Karla Cordero MD  [address]  [address] [facility]  [address]    Patient: Traci Root   YOB: 1960   Date of Visit: 12/19/2019       Dear Dr Glo Neal: Thank you for referring Bradley North to me for evaluation  Below are my notes for this consultation  If you have questions, please do not hesitate to call me  I look forward to following your patient along with you  Sincerely,        Chaparro Payne MD        CC: Fred Pike MD PhD  Chaparro Payne MD  12/19/2019  2:53 PM  Sign at close encounter               Surgical Oncology Follow Up       [facility]  [address] [facility] [address] [facility]  1960  [id number]  [facility]  [facility]  [address]  [facility] [place] [address]    Diagnoses and all orders for this visit:    Carcinoid tumor of ileum, unspecified whether malignant  -     BUN; Future  -     Creatinine, serum; Future  -     CT abdomen pelvis w contrast; Future    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 ([facility] [address] )  -     Chromogranin A; Future        No chief complaint on file  Return in about 3 months (around 3/19/2020) for Office Visit, Imaging - See orders, Labs - See Treatment Plan  Carcinoid tumor of ileum    6/7/2019 Initial Diagnosis     Carcinoid tumor of ileum      8/22/2019 Surgery     Right hemicolectomy, cholecystectomy  - 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)  - Lymphovascular and perineural invasion are present  T3N1M1  G1  3/24 LN         Staging:  Metastatic neuroendocrine tumor of the ileum, [id number]  August 2019  Treatment history:  Right hemicolectomy, intraoperative ultrasound of the liver and microwave ablation of four liver lesions (medial right lobe, segment 7, segment 6/7, segment 6)    Current treatment:  Octreotide   Disease status: ANIYA    History of Present Illness:  Patient returns in follow-up of her neuroendocrine tumor  She is doing well at this time with no complaints  She denies any abdominal pain, nausea, vomiting, flushing, diarrhea, palpitations, headaches, or sweats  She states her bowel movements have returned to normal   Her chromogranin A level is less than 1  She continues octreotide without difficulty  CT from December 5, 2019 reveals multiple liver lesions  These appear to correspond to the ablation zones  There was enhancement in a right lower pole kidney lesion  I personally reviewed the films  She denies any flank pain or hematuria  Review of Systems  Complete ROS Surg Onc:   Complete ROS Surg Onc:   Constitutional: The patient denies new or recent history of general fatigue, no recent weight loss, no change in appetite  Eyes: No complaints of visual problems, no scleral icterus  ENT: no complaints of ear pain, no hoarseness, no difficulty swallowing,  no tinnitus and no new masses in head, oral cavity, or neck  Cardiovascular: No complaints of chest pain, no palpitations, no ankle edema  Respiratory: No complaints of shortness of breath, no cough  Gastrointestinal: No complaints of jaundice, no bloody stools, no pale stools  Genitourinary: No complaints of dysuria, no hematuria, no nocturia, no frequent urination, no urethral discharge  Musculoskeletal: No complaints of weakness, paralysis, joint stiffness or arthralgias  Integumentary: No complaints of rash, no new lesions  Neurological: No complaints of convulsions, no seizures, no dizziness  Hematologic/Lymphatic: No complaints of easy bruising  Endocrine:  No hot or cold intolerance  No polydipsia, polyphagia, or polyuria  Allergy/immunology:  No environmental allergies  No food allergies  Not immunocompromised  Skin:  No pallor or rash  No wound          Patient Active Problem List   Diagnosis    Chronic bilateral low back pain    Colon cancer screening    Depression    Scoliosis of thoracic spine    Right leg pain    Screening for colon cancer    Carcinoid tumor of ileum    Diarrhea    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)    Other long term (current) drug therapy     Past Medical History:   Diagnosis Date    Cancer [facility])     neuroendocrine tumor    Psychiatric disorder     Scoliosis     Strep throat     Weight gain      Past Surgical History:   Procedure Laterality Date    BREAST BIOPSY      BREAST LUMPECTOMY      benign    CHOLECYSTECTOMY N/A 8/22/2019    Procedure: CHOLECYSTECTOMY;  Surgeon: Cuca Ponce MD;  [facility];  Service: Surgical Oncology    COLECTOMY TOTAL Right 8/22/2019    Procedure: HEMICOLECTOMY;  Surgeon: Cuca Ponce MD;  [facility];  Service: Surgical Oncology    HYSTERECTOMY      LAPAROTOMY N/A 8/22/2019    Procedure: LAPAROTOMY EXPLORATORY;  Surgeon: Cuca Ponce MD;  [facility];  Service: Surgical Oncology    PARTIAL HYSTERECTOMY      NM ABLTJ 1/> LVR BRYNN PRQ RF N/A 8/22/2019    Procedure: ABLATION MICROWAVE; INTRAOPERATIVE ULTRASOUND OF THE LIVER;  Surgeon: Cuca Ponce MD;  [facility];  Service: Surgical Oncology    TUBAL LIGATION       Family History   Problem Relation Age of Onset    Thyroid cancer Mother 76    Multiple myeloma Father 68     Social History     Socioeconomic History    Marital status:      Spouse name: Not on file    Number of children: Not on file    Years of education: Not on file    Highest education level: Not on file   Occupational History    Not on file   Social Needs    Financial resource strain: Not on file    Food insecurity:     Worry: Not on file     Inability: Not on file    Transportation needs:     Medical: Not on file     Non-medical: Not on file   Tobacco Use    Smoking status: Never Smoker    Smokeless tobacco: Never Used   Substance and Sexual Activity    Alcohol use: Not Currently Frequency: Never     Binge frequency: Never    Drug use: Not Currently    Sexual activity: Yes   Lifestyle    Physical activity:     Days per week: Not on file     Minutes per session: Not on file    Stress: Not on file   Relationships    Social connections:     Talks on phone: Not on file     Gets together: Not on file     Attends [employer] service: Not on file     Active member of club or organization: Not on file     Attends meetings of clubs or organizations: Not on file     Relationship status: Not on file    Intimate partner violence:     Fear of current or ex partner: Not on file     Emotionally abused: Not on file     Physically abused: Not on file     Forced sexual activity: Not on file   Other Topics Concern    Not on file   Social History Narrative      2 sons    2 Grandkids       Current Outpatient Medications:     Brexpiprazole (REXULTI) 0 5 MG tablet, Take 0 5 mg by mouth daily at bedtime , Disp: , Rfl:     citalopram (CeleXA) 20 mg tablet, Take 1 tablet (20 mg total) by mouth daily (Patient taking differently: Take 20 mg by mouth daily at bedtime ), Disp: 30 tablet, Rfl: 2    octreotide (SandoSTATIN LAR DEPOT) 10 mg IM injection kit, Inject 10 mg into a muscle every 28 days, Disp: , Rfl:   Allergies   Allergen Reactions    Prednisone Other (See Comments) and Confusion     Psychosis      There were no vitals filed for this visit  Physical Exam  Constitutional: General appearance: The Patient is well-developed and well-nourished who appears the stated age in no acute distress  Patient is pleasant and talkative  HEENT:  Normocephalic  Sclerae are anicteric  Mucous membranes are moist  Neck is supple without adenopathy  No JVD  Chest: The lungs are clear to auscultation  Cardiac: Heart is regular rate  Abdomen: Abdomen is soft, non-tender, non-distended and without masses  Extremities: There is no clubbing or cyanosis  There is no edema  Symmetric    Neuro: Grossly nonfocal  Gait is normal      Lymphatic: No evidence of cervical adenopathy bilaterally  No evidence of axillary adenopathy bilaterally  No evidence of inguinal adenopathy bilaterally  Skin: Warm, anicteric  Psych:  Patient is pleasant and talkative  Breasts:        Pathology:  [unfilled]    Labs:      Imaging  Ct Abdomen Pelvis W Contrast    Result Date: 12/10/2019  Narrative: CT ABDOMEN AND PELVIS WITH IV CONTRAST INDICATION:   Follow-up carcinoid tumor  COMPARISON:  CT from 6/18/2019 and PET/CT from 7/9/2019 TECHNIQUE:  CT examination of the abdomen and pelvis was performed  In addition to portal venous phase postcontrast scanning through the abdomen and pelvis, delayed phase postcontrast scanning was performed through the upper abdominal viscera  Axial, sagittal, and coronal 2D reformatted images were created from the source data and submitted for interpretation  Radiation dose length product (DLP) for this visit:  741 55 mGy-cm   This examination, like all CT scans performed in the [facility], was performed utilizing techniques to minimize radiation dose exposure, including the use of iterative  reconstruction and automated exposure control  IV Contrast:  100 mL of iohexol (OMNIPAQUE) Enteric Contrast:  Enteric contrast was administered  FINDINGS: ABDOMEN LOWER CHEST:  No clinically significant abnormality identified in the visualized lower chest  LIVER/BILIARY TREE:  The liver is enlarged and slightly low attenuation suggesting hepatic steatosis  Multiple hypodense lesions are seen with delayed peripheral enhancement  Note that arterial phase images were not acquired  There is a hypodense lesion  in the liver measuring 2 6 x 3 8 cm corresponding to the arterially enhancing lesion seen on the prior CT measuring 2 0 x 2 2 cm  A 2nd hypodense lesion is peripheral and wedge-shaped in the right lobe measuring 1 8 x 2 1 cm    This also corresponds to an arterially enhancing lesion on the prior study measuring 1 1 x 1 2 cm  There is a peripheral wedge-shaped hypodense lesion in the posterior right lobe measuring 1 4 x 2 0 cm corresponding to a previously enhancing lesion measuring 1 5 x 1 5 cm  There is a lesion at the tip of the liver measuring 2 5 x 2 6 cm, previously 1 4 x 2 0 cm  These lesions demonstrated uptake on prior PET/CT  Change in appearance and size of the lesions may be related to interval treatment  GALLBLADDER:  No calcified gallstones  No pericholecystic inflammatory change  SPLEEN:  Unremarkable  PANCREAS:  Unremarkable  ADRENAL GLANDS:  Unremarkable  KIDNEYS/URETERS: There is a right lower pole lesion measuring 1 1 cm demonstrating central high attenuation  Without unenhanced images, enhancement is not excluded  One or more sharply circumscribed subcentimeter renal hypodensities are noted  These lesions are too small to accurately characterize, but are statistically most likely to represent benign cortical renal cyst(s)  According to the guidelines published in the [facility] Paper of the ACR Incidental Findings Committee (Radiology 2010), no further workup of these lesions is recommended  No hydronephrosis  STOMACH AND BOWEL:  The patient is status post right hemicolectomy  The enlarged right mesenteric lymph node has been removed  APPENDIX:  No findings to suggest appendicitis  ABDOMINOPELVIC CAVITY:  No ascites or free intraperitoneal air  No lymphadenopathy  VESSELS:  Unremarkable for patient's age  PELVIS REPRODUCTIVE ORGANS:  Patient is status post hysterectomy  URINARY BLADDER:  Unremarkable  ABDOMINAL WALL/INGUINAL REGIONS:  Unremarkable  OSSEOUS STRUCTURES:  No acute fracture or destructive osseous lesion  Impression: 1  Multiple liver lesions again seen though arterial phase images were not acquired and the lesions appear hypoattenuating on portal venous phase  This may be related to interval treatment    While the size of several of the lesions is larger than on the prior study, comparison is limited due to differences in phase of contrast enhancement  2   Central enhancement within a right lower pole renal lesion  This could represent a solid neoplasm though unenhanced images were not obtained  If there is clinical concern, further evaluation with dedicated renal protocol CT or MRI is recommended  Otherwise continued CT follow-up cancer surveillance can be obtained  The study was marked in EPIC for significant notification  Workstation performed: [id number]     I reviewed the above laboratory and imaging data  Discussion/Summary: 56-year-old female status post right hemicolectomy, and microwave ablation of four right-sided liver lesions for a [id number] neuroendocrine tumor ileum  She is clinically ANIYA at 4 months  She is doing very well  She is on octreotide  We will repeat the CT in 3 months  If at that time the ablation zones are stable I will repeat the imaging in 6 months  In regard to the possible renal lesion, she has no symptoms  Told her to discuss further imaging with her primary care physician  We will also re-evaluate this imaging with a CT in 3 months, but I suspect this area is benign  She is agreeable to this plan  All her questions were answered

## 2019-12-19 NOTE — PROGRESS NOTES
Surgical Oncology Follow Up       Vinny  42  [facility]  CANCER [facility] SURGICAL ONCOLOGY [place]  [address] [facility] [address]  [facility] [place] [address]  1960  [phone]  [address]  [facility]  [facility] [place]  [address] [facility] [place] [address]    Diagnoses and all orders for this visit:    Carcinoid tumor of ileum, unspecified whether malignant  -     BUN; Future  -     Creatinine, serum; Future  -     CT abdomen pelvis w contrast; Future    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 ([facility] [address] )  -     Chromogranin A; Future        No chief complaint on file  Return in about 3 months (around 3/19/2020) for Office Visit, Imaging - See orders, Labs - See Treatment Plan  Carcinoid tumor of ileum    6/7/2019 Initial Diagnosis     Carcinoid tumor of ileum      8/22/2019 Surgery     Right hemicolectomy, cholecystectomy  - 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)  - Lymphovascular and perineural invasion are present  T3N1M1  G1  3/24 LN         Staging:  Metastatic neuroendocrine tumor of the ileum, [id number]  August 2019  Treatment history:  Right hemicolectomy, intraoperative ultrasound of the liver and microwave ablation of four liver lesions (medial right lobe, segment 7, segment 6/7, segment 6)  Current treatment:  Octreotide   Disease status: ANIYA    History of Present Illness:  Patient returns in follow-up of her neuroendocrine tumor  She is doing well at this time with no complaints  She denies any abdominal pain, nausea, vomiting, flushing, diarrhea, palpitations, headaches, or sweats  She states her bowel movements have returned to normal   Her chromogranin A level is less than 1  She continues octreotide without difficulty  CT from December 5, 2019 reveals multiple liver lesions  These appear to correspond to the ablation zones  There was enhancement in a right lower pole kidney lesion    I personally reviewed the films  She denies any flank pain or hematuria  Review of Systems  Complete ROS Surg Onc:   Complete ROS Surg Onc:   Constitutional: The patient denies new or recent history of general fatigue, no recent weight loss, no change in appetite  Eyes: No complaints of visual problems, no scleral icterus  ENT: no complaints of ear pain, no hoarseness, no difficulty swallowing,  no tinnitus and no new masses in head, oral cavity, or neck  Cardiovascular: No complaints of chest pain, no palpitations, no ankle edema  Respiratory: No complaints of shortness of breath, no cough  Gastrointestinal: No complaints of jaundice, no bloody stools, no pale stools  Genitourinary: No complaints of dysuria, no hematuria, no nocturia, no frequent urination, no urethral discharge  Musculoskeletal: No complaints of weakness, paralysis, joint stiffness or arthralgias  Integumentary: No complaints of rash, no new lesions  Neurological: No complaints of convulsions, no seizures, no dizziness  Hematologic/Lymphatic: No complaints of easy bruising  Endocrine:  No hot or cold intolerance  No polydipsia, polyphagia, or polyuria  Allergy/immunology:  No environmental allergies  No food allergies  Not immunocompromised  Skin:  No pallor or rash  No wound          Patient Active Problem List   Diagnosis    Chronic bilateral low back pain    Colon cancer screening    Depression    Scoliosis of thoracic spine    Right leg pain    Screening for colon cancer    Carcinoid tumor of ileum    Diarrhea    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 ([address] )    Other long term (current) drug therapy     Past Medical History:   Diagnosis Date    Cancer [facility])     neuroendocrine tumor    Psychiatric disorder     Scoliosis     Strep throat     Weight gain      Past Surgical History:   Procedure Laterality Date    BREAST BIOPSY      BREAST LUMPECTOMY      benign    CHOLECYSTECTOMY N/A 8/22/2019 Procedure: CHOLECYSTECTOMY;  Surgeon: Vance Hernandez MD;  [facility];  Service: Surgical Oncology    COLECTOMY TOTAL Right 8/22/2019    Procedure: HEMICOLECTOMY;  Surgeon: Vance Hernandez MD;  [facility];  Service: Surgical Oncology    HYSTERECTOMY      LAPAROTOMY N/A 8/22/2019    Procedure: LAPAROTOMY EXPLORATORY;  Surgeon: Vance Hernandez MD;  [facility];  Service: Surgical Oncology    PARTIAL HYSTERECTOMY      TN ABLTJ 1/> LVR BRYNN PRQ RF N/A 8/22/2019    Procedure: ABLATION MICROWAVE; INTRAOPERATIVE ULTRASOUND OF THE LIVER;  Surgeon: Vance Hernandez MD;  [facility];  Service: Surgical Oncology    TUBAL LIGATION       Family History   Problem Relation Age of Onset    Thyroid cancer Mother 76    Multiple myeloma Father 68     Social History     Socioeconomic History    Marital status:      Spouse name: Not on file    Number of children: Not on file    Years of education: Not on file    Highest education level: Not on file   Occupational History    Not on file   Social Needs    Financial resource strain: Not on file    Food insecurity:     Worry: Not on file     Inability: Not on file    Transportation needs:     Medical: Not on file     Non-medical: Not on file   Tobacco Use    Smoking status: Never Smoker    Smokeless tobacco: Never Used   Substance and Sexual Activity    Alcohol use: Not Currently     Frequency: Never     Binge frequency: Never    Drug use: Not Currently    Sexual activity: Yes   Lifestyle    Physical activity:     Days per week: Not on file     Minutes per session: Not on file    Stress: Not on file   Relationships    Social connections:     Talks on phone: Not on file     Gets together: Not on file     Attends Faith service: Not on file     Active member of club or organization: Not on file     Attends meetings of clubs or organizations: Not on file     Relationship status: Not on file    Intimate partner violence:     Fear of current or ex partner: Not on file     Emotionally abused: Not on file     Physically abused: Not on file     Forced sexual activity: Not on file   Other Topics Concern    Not on file   Social History Narrative      2 sons    2 Grandkids       Current Outpatient Medications:     Brexpiprazole (REXULTI) 0 5 MG tablet, Take 0 5 mg by mouth daily at bedtime , Disp: , Rfl:     citalopram (CeleXA) 20 mg tablet, Take 1 tablet (20 mg total) by mouth daily (Patient taking differently: Take 20 mg by mouth daily at bedtime ), Disp: 30 tablet, Rfl: 2    octreotide (SandoSTATIN LAR DEPOT) 10 mg IM injection kit, Inject 10 mg into a muscle every 28 days, Disp: , Rfl:   Allergies   Allergen Reactions    Prednisone Other (See Comments) and Confusion     Psychosis      There were no vitals filed for this visit  Physical Exam  Constitutional: General appearance: The Patient is well-developed and well-nourished who appears the stated age in no acute distress  Patient is pleasant and talkative  HEENT:  Normocephalic  Sclerae are anicteric  Mucous membranes are moist  Neck is supple without adenopathy  No JVD  Chest: The lungs are clear to auscultation  Cardiac: Heart is regular rate  Abdomen: Abdomen is soft, non-tender, non-distended and without masses  Extremities: There is no clubbing or cyanosis  There is no edema  Symmetric  Neuro: Grossly nonfocal  Gait is normal      Lymphatic: No evidence of cervical adenopathy bilaterally  No evidence of axillary adenopathy bilaterally  No evidence of inguinal adenopathy bilaterally  Skin: Warm, anicteric  Psych:  Patient is pleasant and talkative  Breasts:        Pathology:  [unfilled]    Labs:      Imaging  Ct Abdomen Pelvis W Contrast    Result Date: 12/10/2019  Narrative: CT ABDOMEN AND PELVIS WITH IV CONTRAST INDICATION:   Follow-up carcinoid tumor   COMPARISON:  CT from 6/18/2019 and PET/CT from 7/9/2019 TECHNIQUE:  CT examination of the abdomen and pelvis was performed  In addition to portal venous phase postcontrast scanning through the abdomen and pelvis, delayed phase postcontrast scanning was performed through the upper abdominal viscera  Axial, sagittal, and coronal 2D reformatted images were created from the source data and submitted for interpretation  Radiation dose length product (DLP) for this visit:  741 55 mGy-cm   This examination, like all CT scans performed in the [facility], was performed utilizing techniques to minimize radiation dose exposure, including the use of iterative  reconstruction and automated exposure control  IV Contrast:  100 mL of iohexol (OMNIPAQUE) Enteric Contrast:  Enteric contrast was administered  FINDINGS: ABDOMEN LOWER CHEST:  No clinically significant abnormality identified in the visualized lower chest  LIVER/BILIARY TREE:  The liver is enlarged and slightly low attenuation suggesting hepatic steatosis  Multiple hypodense lesions are seen with delayed peripheral enhancement  Note that arterial phase images were not acquired  There is a hypodense lesion  in the liver measuring 2 6 x 3 8 cm corresponding to the arterially enhancing lesion seen on the prior CT measuring 2 0 x 2 2 cm  A 2nd hypodense lesion is peripheral and wedge-shaped in the right lobe measuring 1 8 x 2 1 cm  This also corresponds to an arterially enhancing lesion on the prior study measuring 1 1 x 1 2 cm  There is a peripheral wedge-shaped hypodense lesion in the posterior right lobe measuring 1 4 x 2 0 cm corresponding to a previously enhancing lesion measuring 1 5 x 1 5 cm  There is a lesion at the tip of the liver measuring 2 5 x 2 6 cm, previously 1 4 x 2 0 cm  These lesions demonstrated uptake on prior PET/CT  Change in appearance and size of the lesions may be related to interval treatment  GALLBLADDER:  No calcified gallstones  No pericholecystic inflammatory change  SPLEEN:  Unremarkable   PANCREAS: Unremarkable  ADRENAL GLANDS:  Unremarkable  KIDNEYS/URETERS: There is a right lower pole lesion measuring 1 1 cm demonstrating central high attenuation  Without unenhanced images, enhancement is not excluded  One or more sharply circumscribed subcentimeter renal hypodensities are noted  These lesions are too small to accurately characterize, but are statistically most likely to represent benign cortical renal cyst(s)  According to the guidelines published in the [facility] Paper of the ACR Incidental Findings Committee (Radiology 2010), no further workup of these lesions is recommended  No hydronephrosis  STOMACH AND BOWEL:  The patient is status post right hemicolectomy  The enlarged right mesenteric lymph node has been removed  APPENDIX:  No findings to suggest appendicitis  ABDOMINOPELVIC CAVITY:  No ascites or free intraperitoneal air  No lymphadenopathy  VESSELS:  Unremarkable for patient's age  PELVIS REPRODUCTIVE ORGANS:  Patient is status post hysterectomy  URINARY BLADDER:  Unremarkable  ABDOMINAL WALL/INGUINAL REGIONS:  Unremarkable  OSSEOUS STRUCTURES:  No acute fracture or destructive osseous lesion  Impression: 1  Multiple liver lesions again seen though arterial phase images were not acquired and the lesions appear hypoattenuating on portal venous phase  This may be related to interval treatment  While the size of several of the lesions is larger than on the prior study, comparison is limited due to differences in phase of contrast enhancement  2   Central enhancement within a right lower pole renal lesion  This could represent a solid neoplasm though unenhanced images were not obtained  If there is clinical concern, further evaluation with dedicated renal protocol CT or MRI is recommended  Otherwise continued CT follow-up cancer surveillance can be obtained  The study was marked in EPIC for significant notification   Workstation performed: [id number]     I reviewed the above laboratory and imaging data  Discussion/Summary: 21-year-old female status post right hemicolectomy, and microwave ablation of four right-sided liver lesions for a [id number] neuroendocrine tumor ileum  She is clinically ANIYA at 4 months  She is doing very well  She is on octreotide  We will repeat the CT in 3 months  If at that time the ablation zones are stable I will repeat the imaging in 6 months  In regard to the possible renal lesion, she has no symptoms  Told her to discuss further imaging with her primary care physician  We will also re-evaluate this imaging with a CT in 3 months, but I suspect this area is benign  She is agreeable to this plan  All her questions were answered

## 2020-01-07 ENCOUNTER — APPOINTMENT (OUTPATIENT)
Dept: LAB | Facility: HOSPITAL | Age: 60
End: 2020-01-07
Attending: INTERNAL MEDICINE
Payer: COMMERCIAL

## 2020-01-07 LAB
ALBUMIN SERPL BCP-MCNC: 4.4 G/DL (ref 3.5–5)
ALP SERPL-CCNC: 96 U/L (ref 46–116)
ALT SERPL W P-5'-P-CCNC: 105 U/L (ref 12–78)
ANION GAP SERPL CALCULATED.3IONS-SCNC: 11 MMOL/L (ref 4–13)
AST SERPL W P-5'-P-CCNC: 35 U/L (ref 5–45)
BILIRUB SERPL-MCNC: 0.4 MG/DL (ref 0.2–1)
BUN SERPL-MCNC: 21 MG/DL (ref 5–25)
CALCIUM SERPL-MCNC: 9.3 MG/DL (ref 8.3–10.1)
CHLORIDE SERPL-SCNC: 102 MMOL/L (ref 100–108)
CO2 SERPL-SCNC: 26 MMOL/L (ref 21–32)
CREAT SERPL-MCNC: 0.76 MG/DL (ref 0.6–1.3)
GFR SERPL CREATININE-BSD FRML MDRD: 87 ML/MIN/1.73SQ M
GLUCOSE SERPL-MCNC: 104 MG/DL (ref 65–140)
POTASSIUM SERPL-SCNC: 3.7 MMOL/L (ref 3.5–5.3)
PROT SERPL-MCNC: 7.8 G/DL (ref 6.4–8.2)
SODIUM SERPL-SCNC: 139 MMOL/L (ref 136–145)

## 2020-01-07 PROCEDURE — 84260 ASSAY OF SEROTONIN: CPT | Performed by: INTERNAL MEDICINE

## 2020-01-07 PROCEDURE — 86316 IMMUNOASSAY TUMOR OTHER: CPT | Performed by: INTERNAL MEDICINE

## 2020-01-07 PROCEDURE — 36415 COLL VENOUS BLD VENIPUNCTURE: CPT | Performed by: INTERNAL MEDICINE

## 2020-01-07 PROCEDURE — 80053 COMPREHEN METABOLIC PANEL: CPT | Performed by: INTERNAL MEDICINE

## 2020-01-08 ENCOUNTER — HOSPITAL ENCOUNTER (OUTPATIENT)
Dept: INFUSION CENTER | Facility: CLINIC | Age: 60
Discharge: HOME | End: 2020-01-08
Payer: COMMERCIAL

## 2020-01-08 VITALS
DIASTOLIC BLOOD PRESSURE: 68 MMHG | HEART RATE: 61 BPM | TEMPERATURE: 97.1 F | SYSTOLIC BLOOD PRESSURE: 119 MMHG | RESPIRATION RATE: 18 BRPM

## 2020-01-08 DIAGNOSIS — D3A.012 CARCINOID TUMOR OF ILEUM, UNSPECIFIED WHETHER MALIGNANT: Primary | ICD-10-CM

## 2020-01-08 PROCEDURE — 96372 THER/PROPH/DIAG INJ SC/IM: CPT

## 2020-01-08 RX ADMIN — OCTREOTIDE ACETATE 30 MG: KIT at 15:45

## 2020-01-08 NOTE — PROGRESS NOTES
Pt tolerated injection to rt buttock  Pt declined AVS, is aware of future appts    Ambulated off unit with steady gait

## 2020-01-09 LAB
CGA SERPL-MCNC: 16.5 NG/ML (ref 0–101.8)
SCAN RESULT: NORMAL

## 2020-01-10 LAB — SEROTONIN PLAS-MCNC: 12 NG/ML (ref 0–420)

## 2020-01-30 ENCOUNTER — APPOINTMENT (OUTPATIENT)
Dept: LAB | Facility: HOSPITAL | Age: 60
End: 2020-01-30
Attending: INTERNAL MEDICINE
Payer: COMMERCIAL

## 2020-02-03 ENCOUNTER — HOSPITAL ENCOUNTER (OUTPATIENT)
Dept: INFUSION CENTER | Facility: CLINIC | Age: 60
Discharge: HOME | End: 2020-02-03
Payer: COMMERCIAL

## 2020-02-03 DIAGNOSIS — D3A.012 CARCINOID TUMOR OF ILEUM, UNSPECIFIED WHETHER MALIGNANT: Primary | ICD-10-CM

## 2020-02-03 PROCEDURE — 96372 THER/PROPH/DIAG INJ SC/IM: CPT

## 2020-02-03 RX ADMIN — OCTREOTIDE ACETATE 30 MG: KIT at 11:11

## 2020-02-03 NOTE — PROGRESS NOTES
Pt presents for Sandostatin injection  Pt offers no complaints  Injection placed in left gluteus, tolerated well  AVS declined  No future appointments scheduled, per pt, Dr Alexi Moise will decide whether treatment is necessary after follow up CT next month

## 2020-02-05 ENCOUNTER — TELEPHONE (OUTPATIENT)
Dept: HEMATOLOGY ONCOLOGY | Facility: CLINIC | Age: 60
End: 2020-02-05

## 2020-02-05 NOTE — TELEPHONE ENCOUNTER
Telephone Triage-Symptom Call      [place]  1960  [phone]    Caller: Patient   Chief Complaint: Pain in Lower Buttock area post Sandostatin from 2/3  Current Treatment patient:  Yes  What treatment is patient receiving:  Sandostatin Injection @ infusion  Name of treatment:  Sandostatin  Date of last treatment: 2/3/2020   Recent illness or Surgery: N/A      Description of symptoms per ONS Guidelines review (charting by exception):   Patient had Sandostatin on Monday in Left Buttock area now complaining of lower back pulsating pain 7/10  Denies any pain, or redness to the injection site or pain radiating to the lower legs  Difficulty with sitting at this time  Denies any fever, or chills  No reported falls  Has not taken any medication for the pain  Obtain history of the problem:   Report no recent injury to the area     Guidelines followed: Yes,  Pain Telephone Call Triage     Disposition:  Reviewed Home Care instruction  Will use OTC Tylenol or Motrin as per bottle instruction  Will alert MD office regarding this symptom as she presents with pulsating pain which started today  Instructed patient to call back or seek care if any worsening of symptoms  Patient in agreement with plan at this time  Homecare instructions given: YES    Patient in agreement with plan: YES    Any barriers with plan being carried out: YES    Verified that guidelines were completed and documented:   Yes

## 2020-03-02 ENCOUNTER — HOSPITAL ENCOUNTER (OUTPATIENT)
Dept: CT IMAGING | Facility: HOSPITAL | Age: 60
Discharge: HOME | End: 2020-03-02
Attending: SURGERY
Payer: COMMERCIAL

## 2020-03-02 DIAGNOSIS — D3A.012 CARCINOID TUMOR OF ILEUM, UNSPECIFIED WHETHER MALIGNANT: ICD-10-CM

## 2020-03-02 PROCEDURE — 74177 CT ABD & PELVIS W/CONTRAST: CPT

## 2020-03-02 RX ADMIN — IOHEXOL 100 ML: 350 INJECTION, SOLUTION INTRAVENOUS at 12:05

## 2020-03-18 DIAGNOSIS — C7B.02 METASTATIC MALIGNANT CARCINOID TUMOR TO LIVER (HCC): Primary | ICD-10-CM

## 2020-03-18 NOTE — PROGRESS NOTES
Called patient, spoke to patient herself  She has a metastatic GI neuroendocrine tumor with liver involvement, status post tumor resection, followed by Sandostatin    To be noted she has liver metastatic disease and able to be completely resected    She reported has been doing very well, no new symptoms has been tolerating treatment very well no major issues  A  Plan  - review labs and CT scan with patient, she had a good response, appears tumor decreased in size by 30%    -  discussed with patient for all options, she agreed to cancel tomorrow's appointment given she is doing very well    - will continue current monthly treatment, she will check labs every 4-6 weeks, will follow patient in 3 months patient should do a CT abdomen before follow-up

## 2020-03-31 ENCOUNTER — OFFICE VISIT (OUTPATIENT)
Dept: URGENT CARE | Facility: CLINIC | Age: 60
End: 2020-03-31
Payer: COMMERCIAL

## 2020-03-31 VITALS
HEIGHT: 67 IN | OXYGEN SATURATION: 95 % | BODY MASS INDEX: 26.68 KG/M2 | WEIGHT: 170 LBS | SYSTOLIC BLOOD PRESSURE: 128 MMHG | RESPIRATION RATE: 18 BRPM | HEART RATE: 72 BPM | TEMPERATURE: 97.9 F | DIASTOLIC BLOOD PRESSURE: 82 MMHG

## 2020-03-31 DIAGNOSIS — Z20.822 EXPOSURE TO COVID-19 VIRUS: Primary | ICD-10-CM

## 2020-03-31 PROCEDURE — 87635 SARS-COV-2 COVID-19 AMP PRB: CPT | Performed by: PHYSICIAN ASSISTANT

## 2020-03-31 PROCEDURE — 99213 OFFICE O/P EST LOW 20 MIN: CPT | Performed by: PHYSICIAN ASSISTANT

## 2020-03-31 NOTE — PATIENT INSTRUCTIONS
Case was discussed with both Cata Saldivar and Dr Abe Santa who recommended that I test the patient at this time  [address]    Your healthcare provider and/or public health staff have evaluated you and have determined that you do not need to remain in the hospital at this time  At this time you can be isolated at home where you will be monitored by staff from your local or state health department  You should carefully follow the prevention and isolation steps below until a healthcare provider or local or state health department says that you can return to your normal activities  Stay home except to get medical care    People who are mildly ill with COVID-19 are able to isolate at home during their illness  You should restrict activities outside your home, except for getting medical care  Do not go to work, school, or public areas  Avoid using public transportation, ride-sharing, or taxis  Separate yourself from other people and animals in your home    People: As much as possible, you should stay in a specific room and away from other people in your home  Also, you should use a separate bathroom, if available  Animals: You should restrict contact with pets and other animals while you are sick with COVID-19, just like you would around other people  Although there have not been reports of pets or other animals becoming sick with COVID-19, it is still recommended that people sick with COVID-19 limit contact with animals until more information is known about the virus  When possible, have another member of your household care for your animals while you are sick  If you are sick with COVID-19, avoid contact with your pet, including petting, snuggling, being kissed or licked, and sharing food  If you must care for your pet or be around animals while you are sick, wash your hands before and after you interact with pets and wear a facemask  See COVID-19 and Animals for more information      Call ahead before visiting your doctor    If you have a medical appointment, call the healthcare provider and tell them that you have or may have COVID-19  This will help the healthcare providers office take steps to keep other people from getting infected or exposed  Wear a facemask    You should wear a facemask when you are around other people (e g , sharing a room or vehicle) or pets and before you enter a healthcare providers office  If you are not able to wear a facemask (for example, because it causes trouble breathing), then people who live with you should not stay in the same room with you, or they should wear a facemask if they enter your room  Cover your coughs and sneezes    Cover your mouth and nose with a tissue when you cough or sneeze  Throw used tissues in a lined trash can  Immediately wash your hands with soap and water for at least 20 seconds or, if soap and water are not available, clean your hands with an alcohol-based hand  that contains at least 60% alcohol  Clean your hands often    Wash your hands often with soap and water for at least 20 seconds, especially after blowing your nose, coughing, or sneezing; going to the bathroom; and before eating or preparing food  If soap and water are not readily available, use an alcohol-based hand  with at least 60% alcohol, covering all surfaces of your hands and rubbing them together until they feel dry  Soap and water are the best option if hands are visibly dirty  Avoid touching your eyes, nose, and mouth with unwashed hands  Avoid sharing personal household items    You should not share dishes, drinking glasses, cups, eating utensils, towels, or bedding with other people or pets in your home  After using these items, they should be washed thoroughly with soap and water      Clean all high-touch surfaces everyday    High touch surfaces include counters, tabletops, doorknobs, bathroom fixtures, toilets, phones, keyboards, tablets, and bedside tables  Also, clean any surfaces that may have blood, stool, or body fluids on them  Use a household cleaning spray or wipe, according to the label instructions  Labels contain instructions for safe and effective use of the cleaning product including precautions you should take when applying the product, such as wearing gloves and making sure you have good ventilation during use of the product  Monitor your symptoms    Seek prompt medical attention if your illness is worsening (e g , difficulty breathing)  Before seeking care, call your healthcare provider and tell them that you have, or are being evaluated for, COVID-19  Put on a facemask before you enter the facility  These steps will help the healthcare providers office to keep other people in the office or waiting room from getting infected or exposed  Ask your healthcare provider to call the local or state health department  Persons who are placed under active monitoring or facilitated self-monitoring should follow instructions provided by their local health department or occupational health professionals, as appropriate  If you have a medical emergency and need to call 911, notify the dispatch personnel that you have, or are being evaluated for COVID-19  If possible, put on a facemask before emergency medical services arrive  Discontinuing home isolation    Patients with confirmed COVID-19 should remain under home isolation precautions until the risk of secondary transmission to others is thought to be low  The decision to discontinue home isolation precautions should be made on a case-by-case basis, in consultation with healthcare providers and state and local health departments      Source: [employer]

## 2020-03-31 NOTE — PROGRESS NOTES
[facility]        NAME: Nunu Summers is a 63 y o  female  : 1960    MRN: [id number]  DATE: 2020  TIME: 4:50 PM    Assessment and Plan   Exposure to Covid-19 Virus [Z20 828]  1  Exposure to Covid-19 Virus  MISC COVID-19 TEST- Office Collection         Patient Instructions     Patient Instructions   COVID-19 Home Care Guidelines    Your healthcare provider and/or public health staff have evaluated you and have determined that you do not need to remain in the hospital at this time  At this time you can be isolated at home where you will be monitored by staff from your local or state health department  You should carefully follow the prevention and isolation steps below until a healthcare provider or local or state health department says that you can return to your normal activities  Stay home except to get medical care    People who are mildly ill with COVID-19 are able to isolate at home during their illness  You should restrict activities outside your home, except for getting medical care  Do not go to work, school, or public areas  Avoid using public transportation, ride-sharing, or taxis  Separate yourself from other people and animals in your home    People: As much as possible, you should stay in a specific room and away from other people in your home  Also, you should use a separate bathroom, if available  Animals: You should restrict contact with pets and other animals while you are sick with COVID-19, just like you would around other people  Although there have not been reports of pets or other animals becoming sick with COVID-19, it is still recommended that people sick with COVID-19 limit contact with animals until more information is known about the virus  When possible, have another member of your household care for your animals while you are sick   If you are sick with COVID-19, avoid contact with your pet, including petting, snuggling, being kissed or licked, and sharing food  If you must care for your pet or be around animals while you are sick, wash your hands before and after you interact with pets and wear a facemask  See COVID-19 and Animals for more information  Call ahead before visiting your doctor    If you have a medical appointment, call the healthcare provider and tell them that you have or may have COVID-19  This will help the healthcare providers office take steps to keep other people from getting infected or exposed  Wear a facemask    You should wear a facemask when you are around other people (e g , sharing a room or vehicle) or pets and before you enter a healthcare providers office  If you are not able to wear a facemask (for example, because it causes trouble breathing), then people who live with you should not stay in the same room with you, or they should wear a facemask if they enter your room  Cover your coughs and sneezes    Cover your mouth and nose with a tissue when you cough or sneeze  Throw used tissues in a lined trash can  Immediately wash your hands with soap and water for at least 20 seconds or, if soap and water are not available, clean your hands with an alcohol-based hand  that contains at least 60% alcohol  Clean your hands often    Wash your hands often with soap and water for at least 20 seconds, especially after blowing your nose, coughing, or sneezing; going to the bathroom; and before eating or preparing food  If soap and water are not readily available, use an alcohol-based hand  with at least 60% alcohol, covering all surfaces of your hands and rubbing them together until they feel dry  Soap and water are the best option if hands are visibly dirty  Avoid touching your eyes, nose, and mouth with unwashed hands  Avoid sharing personal household items    You should not share dishes, drinking glasses, cups, eating utensils, towels, or bedding with other people or pets in your home   After using these items, they should be washed thoroughly with soap and water  Clean all high-touch surfaces everyday    High touch surfaces include counters, tabletops, doorknobs, bathroom fixtures, toilets, phones, keyboards, tablets, and bedside tables  Also, clean any surfaces that may have blood, stool, or body fluids on them  Use a household cleaning spray or wipe, according to the label instructions  Labels contain instructions for safe and effective use of the cleaning product including precautions you should take when applying the product, such as wearing gloves and making sure you have good ventilation during use of the product  Monitor your symptoms    Seek prompt medical attention if your illness is worsening (e g , difficulty breathing)  Before seeking care, call your healthcare provider and tell them that you have, or are being evaluated for, COVID-19  Put on a facemask before you enter the facility  These steps will help the healthcare providers office to keep other people in the office or waiting room from getting infected or exposed  Ask your healthcare provider to call the local or state health department  Persons who are placed under active monitoring or facilitated self-monitoring should follow instructions provided by their local health department or occupational health professionals, as appropriate  If you have a medical emergency and need to call 911, notify the dispatch personnel that you have, or are being evaluated for COVID-19  If possible, put on a facemask before emergency medical services arrive  Discontinuing home isolation    Patients with confirmed COVID-19 should remain under home isolation precautions until the risk of secondary transmission to others is thought to be low  The decision to discontinue home isolation precautions should be made on a case-by-case basis, in consultation with healthcare providers and state and local health departments      Source: Fernando fi       Follow up with PCP in 3-5 days  Proceed to  ER if symptoms worsen  Chief Complaint     Chief Complaint   Patient presents with    Physical Exam     here 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  is having no symptoms  History of Present Illness       Patient is a 28-year-old female with a medical history of carcinoid tumor who presents today for evaluation  Patient states that she works at [address] [place] [address] and was in direct contact with a patient who was positive for COVID-19  Patient states that they are not allowed to wear masks at the nursing home  She is a certified nurse's aide  Patient currently has no symptoms at this time  Review of Systems   Review of Systems   Constitutional: Negative for chills and fever  Respiratory: Negative for cough and shortness of breath  Cardiovascular: Negative for chest pain  Gastrointestinal: Negative for abdominal pain and diarrhea  Neurological: Negative for headaches  All other systems reviewed and are negative          Current Medications       Current Outpatient Medications:     Brexpiprazole (REXULTI) 0 5 MG tablet, Take 0 5 mg by mouth daily at bedtime , Disp: , Rfl:     citalopram (CeleXA) 20 mg tablet, Take 1 tablet (20 mg total) by mouth daily (Patient taking differently: Take 20 mg by mouth daily at bedtime ), Disp: 30 tablet, Rfl: 2    octreotide (SandoSTATIN LAR DEPOT) 10 mg IM injection kit, Inject 10 mg into a muscle every 28 days, Disp: , Rfl:     Current Allergies     Allergies as of 03/31/2020 - Reviewed 03/31/2020   Allergen Reaction Noted    Prednisone Other (See Comments) and Confusion 04/20/2011            The following portions of the patient's history were reviewed and updated as appropriate: allergies, current medications, past family history, past medical history, past social history, past surgical history and problem list      Past Medical History:   Diagnosis Date    Cancer [facility])     neuroendocrine tumor    Psychiatric disorder     Scoliosis     Strep throat     Weight gain        Past Surgical History:   Procedure Laterality Date    BREAST BIOPSY      BREAST LUMPECTOMY      benign    CHOLECYSTECTOMY N/A 8/22/2019    Procedure: CHOLECYSTECTOMY;  Surgeon: Noy Arriaga MD;  [facility];  Service: Surgical Oncology    COLECTOMY TOTAL Right 8/22/2019    Procedure: Verba Guarneri;  Surgeon: Noy Arriaga MD;  [facility];  Service: Surgical Oncology    HYSTERECTOMY      LAPAROTOMY N/A 8/22/2019    Procedure: LAPAROTOMY EXPLORATORY;  Surgeon: Noy Arriaga MD;  [facility];  Service: Surgical Oncology    PARTIAL HYSTERECTOMY      NV ABLTJ 1/> LVR BRYNN PRQ RF N/A 8/22/2019    Procedure: ABLATION MICROWAVE; INTRAOPERATIVE ULTRASOUND OF THE LIVER;  Surgeon: Noy Arriaga MD;  [facility];  Service: Surgical Oncology    TUBAL LIGATION         Family History   Problem Relation Age of Onset    Thyroid cancer Mother 76    Multiple myeloma Father 68         Medications have been verified  Objective   /82 (BP Location: Left arm, Patient Position: Sitting)   Pulse 72   Temp 97 9 °F (36 6 °C) (Temporal)   Resp 18   Ht 5' 6 5" (1 689 m)   Wt 77 1 kg (170 lb)   SpO2 95%   BMI 27 03 kg/m²        Physical Exam     Physical Exam   Constitutional: She is oriented to person, place, and time  She appears well-developed and well-nourished  No distress  Cardiovascular: Normal rate, regular rhythm and normal heart sounds  Pulmonary/Chest: Effort normal and breath sounds normal  No respiratory distress  She has no wheezes  Neurological: She is alert and oriented to person, place, and time  Skin: Skin is warm and dry  Psychiatric: She has a normal mood and affect  Nursing note and vitals reviewed

## 2020-03-31 NOTE — LETTER
March 31, 2020     Patient: London Tabor   YOB: 1960   Date of Visit: 3/31/2020       To Whom It May Concern: It is my medical opinion that Fatuma Guerrero should remain out of work until cleared  If you have any questions or concerns, please don't hesitate to call           Sincerely,        Jaimie Callahan PA-C    CC: No Recipients

## 2020-04-01 ENCOUNTER — HOSPITAL ENCOUNTER (OUTPATIENT)
Dept: INFUSION CENTER | Facility: CLINIC | Age: 60
Discharge: HOME | End: 2020-04-01
Payer: COMMERCIAL

## 2020-04-01 DIAGNOSIS — D3A.012 CARCINOID TUMOR OF ILEUM, UNSPECIFIED WHETHER MALIGNANT: Primary | ICD-10-CM

## 2020-04-01 PROCEDURE — 96372 THER/PROPH/DIAG INJ SC/IM: CPT

## 2020-04-01 RX ADMIN — OCTREOTIDE ACETATE 30 MG: KIT at 15:37

## 2020-04-04 ENCOUNTER — TELEPHONE (OUTPATIENT)
Dept: URGENT CARE | Facility: CLINIC | Age: 60
End: 2020-04-04

## 2020-04-04 ENCOUNTER — TELEPHONE (OUTPATIENT)
Dept: FAMILY MEDICINE CLINIC | Facility: CLINIC | Age: 60
End: 2020-04-04

## 2020-04-04 ENCOUNTER — TELEPHONE (OUTPATIENT)
Dept: OBGYN CLINIC | Facility: MEDICAL CENTER | Age: 60
End: 2020-04-04

## 2020-04-04 LAB — SARS-COV-2 RNA SPEC QL NAA+PROBE: NOT DETECTED

## 2020-04-06 ENCOUNTER — TELEPHONE (OUTPATIENT)
Dept: FAMILY MEDICINE CLINIC | Facility: CLINIC | Age: 60
End: 2020-04-06

## 2020-04-06 ENCOUNTER — APPOINTMENT (OUTPATIENT)
Dept: RADIOLOGY | Facility: CLINIC | Age: 60
End: 2020-04-06
Payer: COMMERCIAL

## 2020-04-06 ENCOUNTER — TELEMEDICINE (OUTPATIENT)
Dept: FAMILY MEDICINE CLINIC | Facility: CLINIC | Age: 60
End: 2020-04-06
Payer: COMMERCIAL

## 2020-04-06 ENCOUNTER — TELEPHONE (OUTPATIENT)
Dept: ADMINISTRATIVE | Facility: OTHER | Age: 60
End: 2020-04-06

## 2020-04-06 VITALS — DIASTOLIC BLOOD PRESSURE: 86 MMHG | SYSTOLIC BLOOD PRESSURE: 127 MMHG | TEMPERATURE: 99.1 F | HEART RATE: 93 BPM

## 2020-04-06 DIAGNOSIS — Z20.828 EXPOSURE TO SARS-ASSOCIATED CORONAVIRUS: Primary | ICD-10-CM

## 2020-04-06 DIAGNOSIS — Z20.828 EXPOSURE TO SARS-ASSOCIATED CORONAVIRUS: ICD-10-CM

## 2020-04-06 DIAGNOSIS — Z12.31 VISIT FOR SCREENING MAMMOGRAM: ICD-10-CM

## 2020-04-06 PROBLEM — Z12.11 SCREENING FOR COLON CANCER: Status: RESOLVED | Noted: 2019-04-23 | Resolved: 2020-04-06

## 2020-04-06 PROBLEM — Z12.11 COLON CANCER SCREENING: Status: RESOLVED | Noted: 2018-07-06 | Resolved: 2020-04-06

## 2020-04-06 PROCEDURE — 87635 SARS-COV-2 COVID-19 AMP PRB: CPT

## 2020-04-06 PROCEDURE — 71046 X-RAY EXAM CHEST 2 VIEWS: CPT

## 2020-04-06 PROCEDURE — 99212 OFFICE O/P EST SF 10 MIN: CPT | Performed by: FAMILY MEDICINE

## 2020-04-07 ENCOUNTER — TELEMEDICINE (OUTPATIENT)
Dept: FAMILY MEDICINE CLINIC | Facility: CLINIC | Age: 60
End: 2020-04-07
Payer: COMMERCIAL

## 2020-04-07 VITALS — TEMPERATURE: 98.1 F

## 2020-04-07 DIAGNOSIS — U07.1 2019 NOVEL CORONAVIRUS DISEASE (COVID-19): Primary | ICD-10-CM

## 2020-04-07 LAB — SARS-COV-2 RNA SPEC QL NAA+PROBE: DETECTED

## 2020-04-07 PROCEDURE — 99213 OFFICE O/P EST LOW 20 MIN: CPT | Performed by: FAMILY MEDICINE

## 2020-04-08 ENCOUNTER — TELEMEDICINE (OUTPATIENT)
Dept: FAMILY MEDICINE CLINIC | Facility: CLINIC | Age: 60
End: 2020-04-08
Payer: COMMERCIAL

## 2020-04-08 VITALS — HEIGHT: 67 IN | TEMPERATURE: 99.5 F | BODY MASS INDEX: 26.68 KG/M2 | WEIGHT: 170 LBS

## 2020-04-08 DIAGNOSIS — U07.1 2019 NOVEL CORONAVIRUS DISEASE (COVID-19): Primary | ICD-10-CM

## 2020-04-08 PROCEDURE — 99213 OFFICE O/P EST LOW 20 MIN: CPT | Performed by: FAMILY MEDICINE

## 2020-04-09 ENCOUNTER — TELEPHONE (OUTPATIENT)
Dept: SURGICAL ONCOLOGY | Facility: CLINIC | Age: 60
End: 2020-04-09

## 2020-04-09 ENCOUNTER — TELEMEDICINE (OUTPATIENT)
Dept: FAMILY MEDICINE CLINIC | Facility: CLINIC | Age: 60
End: 2020-04-09
Payer: COMMERCIAL

## 2020-04-09 VITALS
HEART RATE: 93 BPM | SYSTOLIC BLOOD PRESSURE: 124 MMHG | HEIGHT: 67 IN | DIASTOLIC BLOOD PRESSURE: 79 MMHG | BODY MASS INDEX: 26.68 KG/M2 | WEIGHT: 170 LBS | TEMPERATURE: 100.3 F

## 2020-04-09 DIAGNOSIS — U07.1 2019 NOVEL CORONAVIRUS DISEASE (COVID-19): Primary | ICD-10-CM

## 2020-04-09 PROCEDURE — 3008F BODY MASS INDEX DOCD: CPT | Performed by: SURGERY

## 2020-04-09 PROCEDURE — 99213 OFFICE O/P EST LOW 20 MIN: CPT | Performed by: FAMILY MEDICINE

## 2020-04-10 ENCOUNTER — TELEMEDICINE (OUTPATIENT)
Dept: FAMILY MEDICINE CLINIC | Facility: CLINIC | Age: 60
End: 2020-04-10
Payer: COMMERCIAL

## 2020-04-10 VITALS — TEMPERATURE: 100.3 F

## 2020-04-10 DIAGNOSIS — U07.1 2019 NOVEL CORONAVIRUS DISEASE (COVID-19): Primary | ICD-10-CM

## 2020-04-10 PROCEDURE — 99213 OFFICE O/P EST LOW 20 MIN: CPT | Performed by: FAMILY MEDICINE

## 2020-04-11 ENCOUNTER — TELEMEDICINE (OUTPATIENT)
Dept: FAMILY MEDICINE CLINIC | Facility: CLINIC | Age: 60
End: 2020-04-11
Payer: COMMERCIAL

## 2020-04-11 DIAGNOSIS — U07.1 2019 NOVEL CORONAVIRUS DISEASE (COVID-19): Primary | ICD-10-CM

## 2020-04-11 PROCEDURE — 99213 OFFICE O/P EST LOW 20 MIN: CPT | Performed by: FAMILY MEDICINE

## 2020-04-13 ENCOUNTER — TELEMEDICINE (OUTPATIENT)
Dept: FAMILY MEDICINE CLINIC | Facility: CLINIC | Age: 60
End: 2020-04-13
Payer: COMMERCIAL

## 2020-04-13 VITALS — TEMPERATURE: 99.3 F

## 2020-04-13 DIAGNOSIS — U07.1 2019 NOVEL CORONAVIRUS DISEASE (COVID-19): Primary | ICD-10-CM

## 2020-04-13 PROCEDURE — G2012 BRIEF CHECK IN BY MD/QHP: HCPCS | Performed by: FAMILY MEDICINE

## 2020-04-14 ENCOUNTER — TELEMEDICINE (OUTPATIENT)
Dept: FAMILY MEDICINE CLINIC | Facility: CLINIC | Age: 60
End: 2020-04-14
Payer: COMMERCIAL

## 2020-04-14 VITALS — TEMPERATURE: 98.6 F

## 2020-04-14 DIAGNOSIS — U07.1 2019 NOVEL CORONAVIRUS DISEASE (COVID-19): Primary | ICD-10-CM

## 2020-04-14 PROCEDURE — 99212 OFFICE O/P EST SF 10 MIN: CPT | Performed by: FAMILY MEDICINE

## 2020-04-15 ENCOUNTER — TELEMEDICINE (OUTPATIENT)
Dept: FAMILY MEDICINE CLINIC | Facility: CLINIC | Age: 60
End: 2020-04-15
Payer: COMMERCIAL

## 2020-04-15 VITALS — TEMPERATURE: 97.7 F

## 2020-04-15 DIAGNOSIS — U07.1 2019 NOVEL CORONAVIRUS DISEASE (COVID-19): Primary | ICD-10-CM

## 2020-04-15 PROCEDURE — 99213 OFFICE O/P EST LOW 20 MIN: CPT | Performed by: FAMILY MEDICINE

## 2020-04-16 ENCOUNTER — TELEMEDICINE (OUTPATIENT)
Dept: FAMILY MEDICINE CLINIC | Facility: CLINIC | Age: 60
End: 2020-04-16
Payer: COMMERCIAL

## 2020-04-16 VITALS — TEMPERATURE: 97.9 F

## 2020-04-16 DIAGNOSIS — U07.1 2019 NOVEL CORONAVIRUS DISEASE (COVID-19): Primary | ICD-10-CM

## 2020-04-16 PROCEDURE — 99213 OFFICE O/P EST LOW 20 MIN: CPT | Performed by: FAMILY MEDICINE

## 2020-04-17 ENCOUNTER — TELEMEDICINE (OUTPATIENT)
Dept: FAMILY MEDICINE CLINIC | Facility: CLINIC | Age: 60
End: 2020-04-17
Payer: COMMERCIAL

## 2020-04-17 DIAGNOSIS — U07.1 2019 NOVEL CORONAVIRUS DISEASE (COVID-19): Primary | ICD-10-CM

## 2020-04-17 PROCEDURE — 99213 OFFICE O/P EST LOW 20 MIN: CPT | Performed by: PHYSICIAN ASSISTANT

## 2020-04-20 ENCOUNTER — TELEMEDICINE (OUTPATIENT)
Dept: FAMILY MEDICINE CLINIC | Facility: CLINIC | Age: 60
End: 2020-04-20
Payer: COMMERCIAL

## 2020-04-20 DIAGNOSIS — U07.1 2019 NOVEL CORONAVIRUS DISEASE (COVID-19): Primary | ICD-10-CM

## 2020-04-20 PROCEDURE — 99212 OFFICE O/P EST SF 10 MIN: CPT | Performed by: FAMILY MEDICINE

## 2020-04-27 ENCOUNTER — APPOINTMENT (OUTPATIENT)
Dept: LAB | Facility: CLINIC | Age: 60
End: 2020-04-27
Payer: COMMERCIAL

## 2020-04-29 ENCOUNTER — HOSPITAL ENCOUNTER (OUTPATIENT)
Dept: INFUSION CENTER | Facility: CLINIC | Age: 60
Discharge: HOME | End: 2020-04-29
Payer: COMMERCIAL

## 2020-04-29 VITALS
HEART RATE: 73 BPM | TEMPERATURE: 98 F | SYSTOLIC BLOOD PRESSURE: 114 MMHG | RESPIRATION RATE: 18 BRPM | DIASTOLIC BLOOD PRESSURE: 71 MMHG

## 2020-04-29 DIAGNOSIS — D3A.012 CARCINOID TUMOR OF ILEUM, UNSPECIFIED WHETHER MALIGNANT: Primary | ICD-10-CM

## 2020-04-29 PROCEDURE — 96372 THER/PROPH/DIAG INJ SC/IM: CPT

## 2020-04-29 RX ADMIN — OCTREOTIDE ACETATE 30 MG: KIT at 16:16

## 2020-05-26 ENCOUNTER — APPOINTMENT (OUTPATIENT)
Dept: LAB | Facility: HOSPITAL | Age: 60
End: 2020-05-26
Attending: INTERNAL MEDICINE
Payer: COMMERCIAL

## 2020-05-27 ENCOUNTER — HOSPITAL ENCOUNTER (OUTPATIENT)
Dept: INFUSION CENTER | Facility: CLINIC | Age: 60
Discharge: HOME | End: 2020-05-27
Payer: COMMERCIAL

## 2020-05-27 DIAGNOSIS — D3A.012 CARCINOID TUMOR OF ILEUM, UNSPECIFIED WHETHER MALIGNANT: Primary | ICD-10-CM

## 2020-05-27 PROCEDURE — 96372 THER/PROPH/DIAG INJ SC/IM: CPT

## 2020-05-27 RX ADMIN — OCTREOTIDE ACETATE 30 MG: KIT at 15:35

## 2020-06-19 ENCOUNTER — HOSPITAL ENCOUNTER (OUTPATIENT)
Dept: CT IMAGING | Facility: HOSPITAL | Age: 60
Discharge: HOME | End: 2020-06-19
Attending: INTERNAL MEDICINE
Payer: COMMERCIAL

## 2020-06-19 DIAGNOSIS — C7B.02 METASTATIC MALIGNANT CARCINOID TUMOR TO LIVER (HCC): ICD-10-CM

## 2020-06-19 PROCEDURE — 74177 CT ABD & PELVIS W/CONTRAST: CPT

## 2020-06-19 RX ADMIN — IOHEXOL 100 ML: 350 INJECTION, SOLUTION INTRAVENOUS at 11:08

## 2020-06-24 ENCOUNTER — TELEPHONE (OUTPATIENT)
Dept: HEMATOLOGY ONCOLOGY | Facility: CLINIC | Age: 60
End: 2020-06-24

## 2020-06-24 ENCOUNTER — HOSPITAL ENCOUNTER (OUTPATIENT)
Dept: INFUSION CENTER | Facility: CLINIC | Age: 60
Discharge: HOME | End: 2020-06-24
Payer: COMMERCIAL

## 2020-06-24 DIAGNOSIS — D3A.012 CARCINOID TUMOR OF ILEUM, UNSPECIFIED WHETHER MALIGNANT: Primary | ICD-10-CM

## 2020-06-24 PROCEDURE — 96372 THER/PROPH/DIAG INJ SC/IM: CPT

## 2020-06-24 RX ADMIN — OCTREOTIDE ACETATE 30 MG: KIT at 15:31

## 2020-06-25 ENCOUNTER — OFFICE VISIT (OUTPATIENT)
Dept: HEMATOLOGY ONCOLOGY | Facility: CLINIC | Age: 60
End: 2020-06-25
Payer: COMMERCIAL

## 2020-06-25 VITALS
SYSTOLIC BLOOD PRESSURE: 108 MMHG | TEMPERATURE: 97.6 F | RESPIRATION RATE: 16 BRPM | BODY MASS INDEX: 27.82 KG/M2 | DIASTOLIC BLOOD PRESSURE: 72 MMHG | WEIGHT: 175 LBS

## 2020-06-25 DIAGNOSIS — C7B.02 METASTATIC MALIGNANT CARCINOID TUMOR TO LIVER (HCC): Primary | ICD-10-CM

## 2020-06-25 PROCEDURE — 1036F TOBACCO NON-USER: CPT | Performed by: INTERNAL MEDICINE

## 2020-06-25 PROCEDURE — 99214 OFFICE O/P EST MOD 30 MIN: CPT | Performed by: INTERNAL MEDICINE

## 2020-07-02 ENCOUNTER — OFFICE VISIT (OUTPATIENT)
Dept: SURGICAL ONCOLOGY | Facility: CLINIC | Age: 60
End: 2020-07-02
Payer: COMMERCIAL

## 2020-07-02 VITALS
HEART RATE: 67 BPM | DIASTOLIC BLOOD PRESSURE: 82 MMHG | TEMPERATURE: 97.6 F | BODY MASS INDEX: 27.19 KG/M2 | WEIGHT: 171 LBS | SYSTOLIC BLOOD PRESSURE: 124 MMHG

## 2020-07-02 DIAGNOSIS — C7B.02 METASTATIC MALIGNANT CARCINOID TUMOR TO LIVER (HCC): ICD-10-CM

## 2020-07-02 DIAGNOSIS — D3A.012 CARCINOID TUMOR OF ILEUM, UNSPECIFIED WHETHER MALIGNANT: Primary | ICD-10-CM

## 2020-07-02 PROCEDURE — 1036F TOBACCO NON-USER: CPT | Performed by: SURGERY

## 2020-07-02 PROCEDURE — 99213 OFFICE O/P EST LOW 20 MIN: CPT | Performed by: SURGERY

## 2020-07-02 NOTE — LETTER
July 2, 2020     Lenard Nicole MD  [address]  [address] [facility]  [address]    Patient: Catherine Duckworth   YOB: 1960   Date of Visit: 7/2/2020       Dear Dr Anushka Bonilla: Thank you for referring Rod Swan to me for evaluation  Below are my notes for this consultation  If you have questions, please do not hesitate to call me  I look forward to following your patient along with you  Sincerely,        Rosa Betancourt MD        CC: Caitlyn Hudson MD PhD  Rosa Betancourt MD  7/2/2020  2:57 PM  Sign at close encounter               Surgical Oncology Follow Up       [facility]  [address] [facility] [place] [address]  1960  [id number]  [address] [facility] SURGICAL ONCOLOGY [facility]  [address] [facility] [place] [address]    Diagnoses and all orders for this visit:    Carcinoid tumor of ileum, unspecified whether malignant    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 [facility])        Chief Complaint   Patient presents with    Follow-up       Return in about 6 months (around 1/2/2021) for Office Visit  Carcinoid tumor of ileum    6/7/2019 Initial Diagnosis     Carcinoid tumor of ileum      8/22/2019 Surgery     Right hemicolectomy, cholecystectomy  - 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)  - Lymphovascular and perineural invasion are present  T3N1M1  G1  3/24 LN         Staging:  Metastatic neuroendocrine tumor of the ileum, [id number]   August 2019  Treatment history:  Right hemicolectomy, intraoperative ultrasound of the liver and microwave ablation of four liver lesions (medial right lobe, segment 7, segment 6/7, segment 6)  Current treatment:  Octreotide   Disease status: ANIYA    History of Present Illness:  Patient returns in follow-up of her neuroendocrine tumor  She is doing well at this time with no complaints    She denies any abdominal pain, nausea or vomiting  There is no flushing, diarrhea, palpitations, headaches or sweats  She occasionally has 1 very loose bowel movement but generally her bowel movements have been normal   Her chromogranin A level is normal at 20 9  CT from June 19, 2020 is essentially stable  There is some nodular enhancement on areas that have been ablated  It is unclear if these are actually new  I personally reviewed the films  Review of Systems  Complete ROS Surg Onc:   Complete ROS Surg Onc:   Constitutional: The patient denies new or recent history of general fatigue, no recent weight loss, no change in appetite  Eyes: No complaints of visual problems, no scleral icterus  ENT: no complaints of ear pain, no hoarseness, no difficulty swallowing,  no tinnitus and no new masses in head, oral cavity, or neck  Cardiovascular: No complaints of chest pain, no palpitations, no ankle edema  Respiratory: No complaints of shortness of breath, no cough  Gastrointestinal: No complaints of jaundice, no bloody stools, no pale stools  Genitourinary: No complaints of dysuria, no hematuria, no nocturia, no frequent urination, no urethral discharge  Musculoskeletal: No complaints of weakness, paralysis, joint stiffness or arthralgias  Integumentary: No complaints of rash, no new lesions  Neurological: No complaints of convulsions, no seizures, no dizziness  Hematologic/Lymphatic: No complaints of easy bruising  Endocrine:  No hot or cold intolerance  No polydipsia, polyphagia, or polyuria  Allergy/immunology:  No environmental allergies  No food allergies  Not immunocompromised  Skin:  No pallor or rash  No wound          Patient Active Problem List   Diagnosis    Chronic bilateral low back pain    Depression    Scoliosis of thoracic spine    Right leg pain    Carcinoid tumor of ileum    Diarrhea    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)     neuroendocrine tumor    Psychiatric disorder     Scoliosis     Strep throat     Weight gain      Past Surgical History:   Procedure Laterality Date    BREAST BIOPSY      BREAST LUMPECTOMY      benign    CHOLECYSTECTOMY N/A 8/22/2019    Procedure: CHOLECYSTECTOMY;  Surgeon: Hawk Gay MD;  [facility];  Service: Surgical Oncology    COLECTOMY TOTAL Right 8/22/2019    Procedure: HEMICOLECTOMY;  Surgeon: Hawk Gay MD;  [facility];  Service: Surgical Oncology    HYSTERECTOMY      LAPAROTOMY N/A 8/22/2019    Procedure: LAPAROTOMY EXPLORATORY;  Surgeon: Hawk Gay MD;  [facility];  Service: Surgical Oncology    PARTIAL HYSTERECTOMY      NC ABLTJ 1/> LVR BRYNN PRQ RF N/A 8/22/2019    Procedure: ABLATION MICROWAVE; INTRAOPERATIVE ULTRASOUND OF THE LIVER;  Surgeon: Hawk Gay MD;  [facility];  Service: Surgical Oncology    TUBAL LIGATION       Family History   Problem Relation Age of Onset    Thyroid cancer Mother 76    Multiple myeloma Father 68     Social History     Socioeconomic History    Marital status:      Spouse name: Not on file    Number of children: Not on file    Years of education: Not on file    Highest education level: Not on file   Occupational History    Not on file   Social Needs    Financial resource strain: Not on file    Food insecurity:     Worry: Not on file     Inability: Not on file    Transportation needs:     Medical: Not on file     Non-medical: Not on file   Tobacco Use    Smoking status: Never Smoker    Smokeless tobacco: Never Used   Substance and Sexual Activity    Alcohol use: Not Currently     Frequency: Never     Binge frequency: Never    Drug use: Not Currently    Sexual activity: Yes   Lifestyle    Physical activity:     Days per week: Not on file     Minutes per session: Not on file  Stress: Not on file   Relationships    Social connections:     Talks on phone: Not on file     Gets together: Not on file     Attends Religion service: Not on file     Active member of club or organization: Not on file     Attends meetings of clubs or organizations: Not on file     Relationship status: Not on file    Intimate partner violence:     Fear of current or ex partner: Not on file     Emotionally abused: Not on file     Physically abused: Not on file     Forced sexual activity: Not on file   Other Topics Concern    Not on file   Social History Narrative      2 sons    2 Grandkids       Current Outpatient Medications:     Brexpiprazole (REXULTI) 0 5 MG tablet, Take 0 5 mg by mouth daily at bedtime , Disp: , Rfl:     citalopram (CeleXA) 20 mg tablet, Take 1 tablet (20 mg total) by mouth daily (Patient taking differently: Take 20 mg by mouth daily at bedtime ), Disp: 30 tablet, Rfl: 2    octreotide (SandoSTATIN LAR DEPOT) 10 mg IM injection kit, Inject 10 mg into a muscle every 28 days, Disp: , Rfl:   Allergies   Allergen Reactions    Prednisone Other (See Comments) and Confusion     Psychosis      Vitals:    07/02/20 1440   BP: 124/82   Pulse: 67   Temp: 97 6 °F (36 4 °C)       Physical Exam  Constitutional: General appearance: The Patient is well-developed and well-nourished who appears the stated age in no acute distress  Patient is pleasant and talkative  HEENT:  Normocephalic  Sclerae are anicteric  Mucous membranes are moist  Neck is supple without adenopathy  No JVD  Chest: The lungs are clear to auscultation  Cardiac: Heart is regular rate  Abdomen: Abdomen is soft, non-tender, non-distended and without masses  Extremities: There is no clubbing or cyanosis  There is no edema  Symmetric  Neuro: Grossly nonfocal  Gait is normal      Lymphatic: No evidence of cervical adenopathy bilaterally  No evidence of axillary adenopathy bilaterally   No evidence of inguinal adenopathy bilaterally  Skin: Warm, anicteric  Psych:  Patient is pleasant and talkative  Breasts:        Pathology:  [unfilled]    Labs:  Chromogranin A 0 0 - 101 8 ng/mL 20 1          Imaging  Ct Abdomen Pelvis W Contrast    Result Date: 6/22/2020  Narrative: CT ABDOMEN AND PELVIS WITH IV CONTRAST INDICATION:   C7B 02: Secondary carcinoid tumors of liver  intraoperative ultrasound of the liver with microwave ablation of 4 liver lesions(medial right lobe, segment 7, segment 6/7, and segment 6  The patient is currently on octreotide    COMPARISON:  Previous study from March 2, 2020, study from June 18, 2019, previous study from December 19 TECHNIQUE:  CT examination of the abdomen and pelvis was performed  Scanning through the abdomen was performed in arterial, venous and delayed phases according a protocol spefically designed to evaluate upper abdominal viscera  Axial, sagittal, and coronal 2D reformatted images were created from the source data and submitted for interpretation  Radiation dose length product (DLP) for this visit:  1157 mGy-cm   This examination, like all CT scans performed in the [facility], was performed utilizing techniques to minimize radiation dose exposure, including the use of iterative reconstruction and automated exposure control  IV Contrast:  100 mL of iohexol (OMNIPAQUE) Enteric Contrast:  Enteric contrast was not administered  FINDINGS: ABDOMEN LOWER CHEST:  No clinically significant abnormality identified in the visualized lower chest  LIVER/BILIARY TREE:  Hepatic steatosis seen A lesion seen in the segment 8 which demonstrates internal nodularity and surrounding perfusional changes  This lesion now measures 2 7 x 2 1 cm     This is seen in image 36 series 2 On the previous study this lesion was measuring 3 3 x 2 1 cm On the previous study small subcapsular enhancing lesion at the junction of segment 7 and 8 was described    This is seen in image 20 series 3 and stable Again noted is a segment 6 lesion which demonstrates the area of fluid attenuation  There is intralesional nodular residual enhancement  This lesion measures 2 0 x 2 5 cm  On the previous study this was measuring 2 0 x 2 4 cm this is seen in image 65 series 2 An additional subcapsular segment 7 lesion is seen in image 27 series 3, image 46 series 2  This lesion demonstrates the a small enhancing the nodule on the arterial phase images  Nodularity is not assessed on the previous study as lack of availability  of the arterial phase images On the portal venous phase image this lesion demonstrates some decreasing internal hypodensity  This lesion measures 2 x 1 9 cm  There are on the previous study this was measuring 2 x 2 1 cm A hypervascular enhancing nodule seen in the segment 8 in image 31 series 2, image 14 series 6 and image 15 series 3, unchanged from the March 2, 2020 and from the previous study of December 5, 2019 GALLBLADDER:  Not identified SPLEEN:  Unremarkable  PANCREAS:  Unremarkable  ADRENAL GLANDS:  Unremarkable  KIDNEYS/URETERS:  Left renal cyst seen Mild prominence of the right pelvicalyceal system seen, unchanged from the previous study Right lower pole renal lesion measuring 10 mm, stable STOMACH AND BOWEL:  Unremarkable  APPENDIX:  No findings to suggest appendicitis  ABDOMINOPELVIC CAVITY:  No ascites  No pneumoperitoneum  No lymphadenopathy  VESSELS:  Celiac trunk appear unremarkable SMA appear unremarkable NAKIA appear PELVIS REPRODUCTIVE ORGANS:  The uterus is surgically absent URINARY BLADDER:  Unremarkable  ABDOMINAL WALL/INGUINAL REGIONS:  Unremarkable  OSSEOUS STRUCTURES:  No acute compression collapse No gross lytic lesion     Impression: No new liver lesion Posttreatment changes in the previously noted liver lesion with the residual viability    No significant enlargement of these lesions A 3 mm nodular area of enhancement seen in the segment 7 liver on the arterial phase images as seen in image 46 series 2  This can be assessed on the follow-up studies This is not assessed on the previous studies as arterial phase images were not performed No new retroperitoneal or abdominopelvic or mesenteric lymphadenopathy An enhancing solid lesion in the lower pole of the right kidney, unchanged from the previous study of June 18, 2019 Follow-up at 3 months suggested A follow-up notification has been created in [employer] performed: [id number]     I reviewed the above laboratory and imaging data  Discussion/Summary: 69-year-old female status post right hemicolectomy, and microwave ablation of four right-sided liver lesions for a Z3P1V6N3 neuroendocrine tumor ileum  She is clinically ANIYA at nearly 1 year  She is doing very well   She is on octreotide  since she is getting follow-up imaging with Dr Dandy Balderas in 3 months, I will tentatively just see her again in 6 months and use the imaging from medical oncology    She is agreeable to this plan   All her questions were answered

## 2020-07-02 NOTE — PROGRESS NOTES
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 [facility])        Chief Complaint   Patient presents with    Follow-up       Return in about 6 months (around 1/2/2021) for Office Visit  Carcinoid tumor of ileum    6/7/2019 Initial Diagnosis     Carcinoid tumor of ileum      8/22/2019 Surgery     Right hemicolectomy, cholecystectomy  - 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)  - Lymphovascular and perineural invasion are present  T3N1M1  G1  3/24 LN         Staging:  Metastatic neuroendocrine tumor of the ileum, [id number]   August 2019  Treatment history:  Right hemicolectomy, intraoperative ultrasound of the liver and microwave ablation of four liver lesions (medial right lobe, segment 7, segment 6/7, segment 6)  Current treatment:  Octreotide   Disease status: ANIYA    History of Present Illness:  Patient returns in follow-up of her neuroendocrine tumor  She is doing well at this time with no complaints  She denies any abdominal pain, nausea or vomiting  There is no flushing, diarrhea, palpitations, headaches or sweats  She occasionally has 1 very loose bowel movement but generally her bowel movements have been normal   Her chromogranin A level is normal at 20 9  CT from June 19, 2020 is essentially stable  There is some nodular enhancement on areas that have been ablated  It is unclear if these are actually new  I personally reviewed the films      Review of Systems  Complete ROS Surg Onc:   Complete ROS Surg Onc:   Constitutional: The patient denies new or recent history of general fatigue, no recent weight loss, no change in appetite  Eyes: No complaints of visual problems, no scleral icterus  ENT: no complaints of ear pain, no hoarseness, no difficulty swallowing,  no tinnitus and no new masses in head, oral cavity, or neck  Cardiovascular: No complaints of chest pain, no palpitations, no ankle edema  Respiratory: No complaints of shortness of breath, no cough  Gastrointestinal: No complaints of jaundice, no bloody stools, no pale stools  Genitourinary: No complaints of dysuria, no hematuria, no nocturia, no frequent urination, no urethral discharge  Musculoskeletal: No complaints of weakness, paralysis, joint stiffness or arthralgias  Integumentary: No complaints of rash, no new lesions  Neurological: No complaints of convulsions, no seizures, no dizziness  Hematologic/Lymphatic: No complaints of easy bruising  Endocrine:  No hot or cold intolerance  No polydipsia, polyphagia, or polyuria  Allergy/immunology:  No environmental allergies  No food allergies  Not immunocompromised  Skin:  No pallor or rash  No wound          Patient Active Problem List   Diagnosis    Chronic bilateral low back pain    Depression    Scoliosis of thoracic spine    Right leg pain    Carcinoid tumor of ileum    Diarrhea    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 [facility])    Other long term (current) drug therapy    2019 novel coronavirus disease (COVID-19)     Past Medical History:   Diagnosis Date    Cancer [facility])     neuroendocrine tumor    Psychiatric disorder     Scoliosis     Strep throat     Weight gain      Past Surgical History:   Procedure Laterality Date    BREAST BIOPSY      BREAST LUMPECTOMY      benign    CHOLECYSTECTOMY N/A 8/22/2019    Procedure: CHOLECYSTECTOMY;  Surgeon: Erna Herring MD;  [facility];  Service: Surgical Oncology    COLECTOMY TOTAL Right 8/22/2019    Procedure: HEMICOLECTOMY;  Surgeon: Carlos Garcia MD;  [facility];  Service: Surgical Oncology    HYSTERECTOMY      LAPAROTOMY N/A 8/22/2019    Procedure: LAPAROTOMY EXPLORATORY;  Surgeon: Carlos Garcia MD;  [facility];  Service: Surgical Oncology    PARTIAL HYSTERECTOMY      AR ABLTJ 1/> LVR BRYNN PRQ RF N/A 8/22/2019    Procedure: ABLATION MICROWAVE; INTRAOPERATIVE ULTRASOUND OF THE LIVER;  Surgeon: Carlos Garcia MD;  [facility];  Service: Surgical Oncology    TUBAL LIGATION       Family History   Problem Relation Age of Onset    Thyroid cancer Mother 76    Multiple myeloma Father 68     Social History     Socioeconomic History    Marital status:      Spouse name: Not on file    Number of children: Not on file    Years of education: Not on file    Highest education level: Not on file   Occupational History    Not on file   Social Needs    Financial resource strain: Not on file    Food insecurity:     Worry: Not on file     Inability: Not on file    Transportation needs:     Medical: Not on file     Non-medical: Not on file   Tobacco Use    Smoking status: Never Smoker    Smokeless tobacco: Never Used   Substance and Sexual Activity    Alcohol use: Not Currently     Frequency: Never     Binge frequency: Never    Drug use: Not Currently    Sexual activity: Yes   Lifestyle    Physical activity:     Days per week: Not on file     Minutes per session: Not on file    Stress: Not on file   Relationships    Social connections:     Talks on phone: Not on file     Gets together: Not on file     Attends Alevism service: Not on file     Active member of club or organization: Not on file     Attends meetings of clubs or organizations: Not on file     Relationship status: Not on file    Intimate partner violence:     Fear of current or ex partner: Not on file     Emotionally abused: Not on file     Physically abused: Not on file     Forced sexual activity: Not on file   Other Topics Concern    Not on file   Social History Narrative      2 sons    2 Grandkids       Current Outpatient Medications:     Brexpiprazole (REXULTI) 0 5 MG tablet, Take 0 5 mg by mouth daily at bedtime , Disp: , Rfl:     citalopram (CeleXA) 20 mg tablet, Take 1 tablet (20 mg total) by mouth daily (Patient taking differently: Take 20 mg by mouth daily at bedtime ), Disp: 30 tablet, Rfl: 2    octreotide (SandoSTATIN LAR DEPOT) 10 mg IM injection kit, Inject 10 mg into a muscle every 28 days, Disp: , Rfl:   Allergies   Allergen Reactions    Prednisone Other (See Comments) and Confusion     Psychosis      Vitals:    07/02/20 1440   BP: 124/82   Pulse: 67   Temp: 97 6 °F (36 4 °C)       Physical Exam  Constitutional: General appearance: The Patient is well-developed and well-nourished who appears the stated age in no acute distress  Patient is pleasant and talkative  HEENT:  Normocephalic  Sclerae are anicteric  Mucous membranes are moist  Neck is supple without adenopathy  No JVD  Chest: The lungs are clear to auscultation  Cardiac: Heart is regular rate  Abdomen: Abdomen is soft, non-tender, non-distended and without masses  Extremities: There is no clubbing or cyanosis  There is no edema  Symmetric  Neuro: Grossly nonfocal  Gait is normal      Lymphatic: No evidence of cervical adenopathy bilaterally  No evidence of axillary adenopathy bilaterally  No evidence of inguinal adenopathy bilaterally  Skin: Warm, anicteric  Psych:  Patient is pleasant and talkative  Breasts:        Pathology:  [unfilled]    Labs:  Chromogranin A 0 0 - 101 8 ng/mL 20 1          Imaging  Ct Abdomen Pelvis W Contrast    Result Date: 6/22/2020  Narrative: CT ABDOMEN AND PELVIS WITH IV CONTRAST INDICATION:   C7B 02: Secondary carcinoid tumors of liver  intraoperative ultrasound of the liver with microwave ablation of 4 liver lesions(medial right lobe, segment 7, segment 6/7, and segment 6    The patient is currently on octreotide    COMPARISON:  Previous study from March 2, 2020, study from June 18, 2019, previous study from December 19 TECHNIQUE:  CT examination of the abdomen and pelvis was performed  Scanning through the abdomen was performed in arterial, venous and delayed phases according a protocol spefically designed to evaluate upper abdominal viscera  Axial, sagittal, and coronal 2D reformatted images were created from the source data and submitted for interpretation  Radiation dose length product (DLP) for this visit:  1157 mGy-cm   This examination, like all CT scans performed in the [facility], was performed utilizing techniques to minimize radiation dose exposure, including the use of iterative reconstruction and automated exposure control  IV Contrast:  100 mL of iohexol (OMNIPAQUE) Enteric Contrast:  Enteric contrast was not administered  FINDINGS: ABDOMEN LOWER CHEST:  No clinically significant abnormality identified in the visualized lower chest  LIVER/BILIARY TREE:  Hepatic steatosis seen A lesion seen in the segment 8 which demonstrates internal nodularity and surrounding perfusional changes  This lesion now measures 2 7 x 2 1 cm     This is seen in image 36 series 2 On the previous study this lesion was measuring 3 3 x 2 1 cm On the previous study small subcapsular enhancing lesion at the junction of segment 7 and 8 was described  This is seen in image 20 series 3 and stable Again noted is a segment 6 lesion which demonstrates the area of fluid attenuation  There is intralesional nodular residual enhancement  This lesion measures 2 0 x 2 5 cm  On the previous study this was measuring 2 0 x 2 4 cm this is seen in image 65 series 2 An additional subcapsular segment 7 lesion is seen in image 27 series 3, image 46 series 2  This lesion demonstrates the a small enhancing the nodule on the arterial phase images    Nodularity is not assessed on the previous study as lack of availability  of the arterial phase images On the portal venous phase image this lesion demonstrates some decreasing internal hypodensity  This lesion measures 2 x 1 9 cm  There are on the previous study this was measuring 2 x 2 1 cm A hypervascular enhancing nodule seen in the segment 8 in image 31 series 2, image 14 series 6 and image 15 series 3, unchanged from the March 2, 2020 and from the previous study of December 5, 2019 GALLBLADDER:  Not identified SPLEEN:  Unremarkable  PANCREAS:  Unremarkable  ADRENAL GLANDS:  Unremarkable  KIDNEYS/URETERS:  Left renal cyst seen Mild prominence of the right pelvicalyceal system seen, unchanged from the previous study Right lower pole renal lesion measuring 10 mm, stable STOMACH AND BOWEL:  Unremarkable  APPENDIX:  No findings to suggest appendicitis  ABDOMINOPELVIC CAVITY:  No ascites  No pneumoperitoneum  No lymphadenopathy  VESSELS:  Celiac trunk appear unremarkable SMA appear unremarkable NAKIA appear PELVIS REPRODUCTIVE ORGANS:  The uterus is surgically absent URINARY BLADDER:  Unremarkable  ABDOMINAL WALL/INGUINAL REGIONS:  Unremarkable  OSSEOUS STRUCTURES:  No acute compression collapse No gross lytic lesion     Impression: No new liver lesion Posttreatment changes in the previously noted liver lesion with the residual viability  No significant enlargement of these lesions A 3 mm nodular area of enhancement seen in the segment 7 liver on the arterial phase images as seen in image 46 series 2    This can be assessed on the follow-up studies This is not assessed on the previous studies as arterial phase images were not performed No new retroperitoneal or abdominopelvic or mesenteric lymphadenopathy An enhancing solid lesion in the lower pole of the right kidney, unchanged from the previous study of June 18, 2019 Follow-up at 3 months suggested A follow-up notification has been created in [employer] performed: [id number]     I reviewed the above laboratory and imaging data     Discussion/Summary: 60-year-old female status post right hemicolectomy, and microwave ablation of four right-sided liver lesions for a [id number] neuroendocrine tumor ileum  She is clinically ANIYA at nearly 1 year  She is doing very well   She is on octreotide  since she is getting follow-up imaging with Dr Sunny Chen in 3 months, I will tentatively just see her again in 6 months and use the imaging from medical oncology    She is agreeable to this plan   All her questions were answered

## 2020-07-23 ENCOUNTER — HOSPITAL ENCOUNTER (OUTPATIENT)
Dept: INFUSION CENTER | Facility: CLINIC | Age: 60
Discharge: HOME | End: 2020-07-23
Payer: COMMERCIAL

## 2020-07-23 DIAGNOSIS — D3A.012 CARCINOID TUMOR OF ILEUM, UNSPECIFIED WHETHER MALIGNANT: Primary | ICD-10-CM

## 2020-07-23 PROCEDURE — 96372 THER/PROPH/DIAG INJ SC/IM: CPT

## 2020-07-23 RX ADMIN — OCTREOTIDE ACETATE 30 MG: KIT at 15:33

## 2020-07-23 NOTE — PROGRESS NOTES
Pt to clinic for sandostatin injection, pt tolerated injection in RUQ of buttocks, making next appointment at scheduling when exiting, avs declined

## 2020-07-23 NOTE — PLAN OF CARE
Problem: Potential for Falls  Goal: Patient will remain free of falls  Description  INTERVENTIONS:  - Assess patient frequently for physical needs  -  Identify cognitive and physical deficits and behaviors that affect risk of falls  -  [place] fall precautions as indicated by assessment   - Educate patient/family on patient safety including physical limitations  - Instruct patient to call for assistance with activity based on assessment  - Modify environment to reduce risk of injury  - Consider OT/PT consult to assist with strengthening/mobility  Outcome: Progressing     Problem: SAFETY ADULT  Goal: Patient will remain free of falls  Description  INTERVENTIONS:  - Assess patient frequently for physical needs  -  Identify cognitive and physical deficits and behaviors that affect risk of falls  -  [place] fall precautions as indicated by assessment   - Educate patient/family on patient safety including physical limitations  - Instruct patient to call for assistance with activity based on assessment  - Modify environment to reduce risk of injury  - Consider OT/PT consult to assist with strengthening/mobility  Outcome: Progressing     Problem: Knowledge Deficit  Goal: Patient/family/caregiver demonstrates understanding of disease process, treatment plan, medications, and discharge instructions  Description  Complete learning assessment and assess knowledge base    Interventions:  - Provide teaching at level of understanding  - Provide teaching via preferred learning methods  Outcome: Progressing

## 2020-08-20 ENCOUNTER — HOSPITAL ENCOUNTER (OUTPATIENT)
Dept: INFUSION CENTER | Facility: CLINIC | Age: 60
Discharge: HOME | End: 2020-08-20

## 2020-08-21 ENCOUNTER — HOSPITAL ENCOUNTER (OUTPATIENT)
Dept: INFUSION CENTER | Facility: CLINIC | Age: 60
Discharge: HOME | End: 2020-08-21
Payer: COMMERCIAL

## 2020-08-21 VITALS — TEMPERATURE: 97.5 F

## 2020-08-21 DIAGNOSIS — D3A.012 CARCINOID TUMOR OF ILEUM, UNSPECIFIED WHETHER MALIGNANT: Primary | ICD-10-CM

## 2020-08-21 PROCEDURE — 96372 THER/PROPH/DIAG INJ SC/IM: CPT

## 2020-08-21 RX ADMIN — OCTREOTIDE ACETATE 30 MG: KIT at 15:44

## 2020-08-21 NOTE — PROGRESS NOTES
Pt here for sandostatin injection  Tolerated IM injection in the left upper gluteal quadrant  bandaid placed  AVS given  Discharged in stable condition

## 2020-09-03 ENCOUNTER — OFFICE VISIT (OUTPATIENT)
Dept: FAMILY MEDICINE CLINIC | Facility: CLINIC | Age: 60
End: 2020-09-03
Payer: COMMERCIAL

## 2020-09-03 VITALS
HEIGHT: 67 IN | BODY MASS INDEX: 27.12 KG/M2 | HEART RATE: 68 BPM | TEMPERATURE: 97.8 F | SYSTOLIC BLOOD PRESSURE: 126 MMHG | WEIGHT: 172.8 LBS | DIASTOLIC BLOOD PRESSURE: 84 MMHG | OXYGEN SATURATION: 96 %

## 2020-09-03 DIAGNOSIS — N89.8 VAGINAL DISCHARGE: Primary | ICD-10-CM

## 2020-09-03 DIAGNOSIS — Z12.39 SCREENING FOR BREAST CANCER: ICD-10-CM

## 2020-09-03 PROBLEM — U07.1 2019 NOVEL CORONAVIRUS DISEASE (COVID-19): Status: RESOLVED | Noted: 2020-04-07 | Resolved: 2020-09-03

## 2020-09-03 PROBLEM — M79.604 RIGHT LEG PAIN: Status: RESOLVED | Noted: 2018-07-06 | Resolved: 2020-09-03

## 2020-09-03 PROBLEM — R19.7 DIARRHEA: Status: RESOLVED | Noted: 2019-06-25 | Resolved: 2020-09-03

## 2020-09-03 PROCEDURE — 99213 OFFICE O/P EST LOW 20 MIN: CPT | Performed by: FAMILY MEDICINE

## 2020-09-03 RX ORDER — METRONIDAZOLE 500 MG/1
500 TABLET ORAL EVERY 12 HOURS SCHEDULED
Qty: 14 TABLET | Refills: 0 | Status: SHIPPED | OUTPATIENT
Start: 2020-09-03 | End: 2020-09-10

## 2020-09-03 NOTE — PROGRESS NOTES
Melvin Pruitt 1960 female MRN: [id number]    Acute Visit        ASSESSMENT/PLAN  Problem List Items Addressed This Visit     None      Visit Diagnoses     Vaginal discharge    -  Primary    Relevant Medications    metroNIDAZOLE (FLAGYL) 500 mg tablet    Screening for breast cancer        Relevant Orders    Mammo screening bilateral w 3d & cad          Will treat for BV and if no improvement return for vaginal culture  Future Appointments   Date Time Provider [place]   9/17/2020  3:30 PM MO INF QUICK CHAIR 1 MO Infusion [facility]   9/21/2020 11:30 AM MO CT 1 MO CT [facility] HOSP   9/28/2020 11:40 AM Edison Lundberg MD PhD HEM ONC STR [facility]   1/7/2021  3:15 PM Ella Mcwilliams MD [facility]  P[facility]On[facility]        SUBJECTIVE  CC: Follow-up (vaginal discharge)       63year old here for vaginal discharge started about a week ago  She tried OTC monistat x 3 days without relief  She has yellow discharge and itching  She has had BV before and feels this is the same  Melvin Pruitt is a 63 y o  female who presented for an acute visit complaining of  Review of Systems   Gastrointestinal: Negative for abdominal pain  Genitourinary: Positive for vaginal discharge  Negative for dysuria, flank pain, frequency, vaginal bleeding and vaginal pain         Historical Information   The patient history was reviewed as follows:  Past Medical History:   Diagnosis Date    Cancer [facility])     neuroendocrine tumor    Psychiatric disorder     Scoliosis     Strep throat     Weight gain      Past Surgical History:   Procedure Laterality Date    BREAST BIOPSY      BREAST LUMPECTOMY      benign    CHOLECYSTECTOMY N/A 8/22/2019    Procedure: CHOLECYSTECTOMY;  Surgeon: Ella Mcwilliams MD;  [facility];  Service: Surgical Oncology    COLECTOMY TOTAL Right 8/22/2019    Procedure: HEMICOLECTOMY;  Surgeon: Ella Mcwilliams MD;  [facility];  Service: Surgical Oncology    HYSTERECTOMY      LAPAROTOMY N/A 8/22/2019 Procedure: LAPAROTOMY EXPLORATORY;  Surgeon: Tamika Bailey MD;  [facility];  Service: Surgical Oncology    PARTIAL HYSTERECTOMY      NE ABLTJ 1/> LVR BRYNN PRQ RF N/A 8/22/2019    Procedure: ABLATION MICROWAVE; INTRAOPERATIVE ULTRASOUND OF THE LIVER;  Surgeon: Tamika Bailey MD;  [facility];  Service: Surgical Oncology    TUBAL LIGATION       Family History   Problem Relation Age of Onset    Thyroid cancer Mother 76    Multiple myeloma Father 68      Social History   Social History     Substance and Sexual Activity   Alcohol Use Not Currently    Frequency: Never    Binge frequency: Never     Social History     Substance and Sexual Activity   Drug Use Not Currently     Social History     Tobacco Use   Smoking Status Never Smoker   Smokeless Tobacco Never Used       Medications:   Meds/Allergies   Current Outpatient Medications   Medication Sig Dispense Refill    Brexpiprazole (REXULTI) 0 5 MG tablet Take 0 5 mg by mouth daily at bedtime       citalopram (CeleXA) 20 mg tablet Take 1 tablet (20 mg total) by mouth daily (Patient taking differently: Take 20 mg by mouth daily at bedtime ) 30 tablet 2    octreotide (SandoSTATIN LAR DEPOT) 10 mg IM injection kit Inject 10 mg into a muscle every 28 days      metroNIDAZOLE (FLAGYL) 500 mg tablet Take 1 tablet (500 mg total) by mouth every 12 (twelve) hours for 7 days 14 tablet 0     No current facility-administered medications for this visit  Allergies   Allergen Reactions    Prednisone Other (See Comments) and Confusion     Psychosis        OBJECTIVE  Vitals:   Vitals:    09/03/20 1448   BP: 126/84   Pulse: 68   Temp: 97 8 °F (36 6 °C)   TempSrc: Tympanic   SpO2: 96%   Weight: 78 4 kg (172 lb 12 8 oz)   Height: 5' 6 5" (1 689 m)       Invasive Devices     None                 Physical Exam  Vitals signs and nursing note reviewed  Constitutional:       Appearance: Normal appearance     Cardiovascular:      Rate and Rhythm: Normal rate and regular rhythm  Pulses: Normal pulses  Heart sounds: Normal heart sounds  Pulmonary:      Effort: Pulmonary effort is normal       Breath sounds: Normal breath sounds  Abdominal:      Tenderness: There is no abdominal tenderness  Neurological:      Mental Status: She is alert  Psychiatric:         Mood and Affect: Mood normal           Lab:  I have personally reviewed all pertinent results  BMI Counseling: Body mass index is 27 47 kg/m²  The BMI is above normal  Nutrition recommendations include decreasing overall calorie intake and 3-5 servings of fruits/vegetables daily  Exercise recommendations include vigorous aerobic physical activity for 75 minutes/week

## 2020-09-15 ENCOUNTER — APPOINTMENT (OUTPATIENT)
Dept: LAB | Facility: HOSPITAL | Age: 60
End: 2020-09-15
Attending: INTERNAL MEDICINE
Payer: COMMERCIAL

## 2020-09-17 ENCOUNTER — HOSPITAL ENCOUNTER (OUTPATIENT)
Dept: INFUSION CENTER | Facility: CLINIC | Age: 60
Discharge: HOME | End: 2020-09-17
Payer: COMMERCIAL

## 2020-09-17 DIAGNOSIS — D3A.012 CARCINOID TUMOR OF ILEUM, UNSPECIFIED WHETHER MALIGNANT: Primary | ICD-10-CM

## 2020-09-17 PROCEDURE — 96372 THER/PROPH/DIAG INJ SC/IM: CPT

## 2020-09-17 RX ADMIN — OCTREOTIDE ACETATE 30 MG: KIT at 15:43

## 2020-09-17 NOTE — PLAN OF CARE
Problem: SAFETY ADULT  Goal: Patient will remain free of falls  Description: INTERVENTIONS:  - Assess patient frequently for physical needs  -  Identify cognitive and physical deficits and behaviors that affect risk of falls  -  [place] fall precautions as indicated by assessment   - Educate patient/family on patient safety including physical limitations  - Instruct patient to call for assistance with activity based on assessment  - Modify environment to reduce risk of injury  - Consider OT/PT consult to assist with strengthening/mobility  Outcome: Progressing     Problem: Knowledge Deficit  Goal: Patient/family/caregiver demonstrates understanding of disease process, treatment plan, medications, and discharge instructions  Description: Complete learning assessment and assess knowledge base    Interventions:  - Provide teaching at level of understanding  - Provide teaching via preferred learning methods  Outcome: Progressing

## 2020-09-17 NOTE — PROGRESS NOTES
Pt to clinic for sandostatin, tolerated injection in RUQ of buttocks without complications, aware of next appointment, avs printed and reviewed

## 2020-09-21 ENCOUNTER — HOSPITAL ENCOUNTER (OUTPATIENT)
Dept: CT IMAGING | Facility: HOSPITAL | Age: 60
Discharge: HOME | End: 2020-09-21
Attending: INTERNAL MEDICINE
Payer: COMMERCIAL

## 2020-09-21 DIAGNOSIS — C7B.02 METASTATIC MALIGNANT CARCINOID TUMOR TO LIVER (HCC): ICD-10-CM

## 2020-09-21 PROCEDURE — 74177 CT ABD & PELVIS W/CONTRAST: CPT

## 2020-09-21 RX ADMIN — IOHEXOL 100 ML: 350 INJECTION, SOLUTION INTRAVENOUS at 11:23

## 2020-09-28 ENCOUNTER — OFFICE VISIT (OUTPATIENT)
Dept: HEMATOLOGY ONCOLOGY | Facility: CLINIC | Age: 60
End: 2020-09-28
Payer: COMMERCIAL

## 2020-09-28 VITALS
TEMPERATURE: 97.6 F | WEIGHT: 172 LBS | BODY MASS INDEX: 27.64 KG/M2 | DIASTOLIC BLOOD PRESSURE: 64 MMHG | HEIGHT: 66 IN | OXYGEN SATURATION: 97 % | HEART RATE: 63 BPM | RESPIRATION RATE: 18 BRPM | SYSTOLIC BLOOD PRESSURE: 112 MMHG

## 2020-09-28 DIAGNOSIS — C7B.02 METASTATIC MALIGNANT CARCINOID TUMOR TO LIVER (HCC): Primary | ICD-10-CM

## 2020-09-28 DIAGNOSIS — D3A.012 CARCINOID TUMOR OF ILEUM, UNSPECIFIED WHETHER MALIGNANT: ICD-10-CM

## 2020-09-28 PROCEDURE — 1036F TOBACCO NON-USER: CPT | Performed by: INTERNAL MEDICINE

## 2020-09-28 PROCEDURE — 99214 OFFICE O/P EST MOD 30 MIN: CPT | Performed by: INTERNAL MEDICINE

## 2020-09-28 NOTE — PROGRESS NOTES
HEMATOLOGY / ONCOLOGY CLINIC NOTE    Primary Care Provider: Venkata Haile MD  Referring Provider:    MRN: [id number]  : 1960    Reason for Encounter:    Chief Complaint   Patient presents with    Follow-up         History of Hematology / Oncology Illness:     Wendy Huang is a 63 y o  female who came in  to establish care with oncology    1, GI carcinoid tumor, T3N1M1  G1  - presented with diarrhea ongoing for 2 years  Colonoscopy showed polyps, biopsy showed GI carcinoid tumor     - Ki 67 less than 3 percent  Low to intermediate grade    -  PET-CT showed intra-abdominal lymphadenopathy and liver leison  - 2019 : s/p Right hemicolectomy, cholecystectomy, 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); Found Lymphovascular and perineural invasion are present;   T3N1M1  G1;  3/24 LN  -  :  Intraoperative  liver lesion ablation    Cancer marker all normal before surgery    Current treatment:  Sandostatin, every month started in 10/2019 , plan for 2 years             Assessment / Plan:         1  Carcinoid tumor of ileum  -  review labs and CT scan with patient, no major finding, cancer is under control, continue current treatment  Initial plan was for 2 years  We may prolong the treatment depending on future cancer status    - no evidence for carcinoid syndrome  Will repeat CT scan in 3 months and follow patient after       - CT abdomen pelvis w contrast; Future    2  Carcinoid tumor of ileum, unspecified whether malignant     - CT abdomen pelvis w contrast; Future         3   Diarrhea  -  intermittent, tolerable            Made patient aware regarding side effects of chemotherapy, including but not limited to fatigue cytopenia, increased risk for infection, potential kidney, liver injuries neuropathies et al    Made patient aware to call MD or go to ED for any fever,  Chills, bleeding, easy bruise, unhealed wound, chest pain, abdominal pain et al  25    minutes were spent face to face with patient with greater than 50% of the time spent in counseling or coordination of care including discussions of treatment instructions  All of the patient's questions were answered to their satisfactory during this discussion  Advised pt to call if there is any further questions  Interval History:      7/2/2019 :  Patient is a 80-year-old female, with history of depression, chronic diarrhea, recently diagnosed GI neuroendocrine tumor  [facility] for comanagement  Patient reported other than diarrhea, she has no flushing, no dyspnea, no abdominal pain  She has no other malignancies in the past, her diarrhea is about 3 to 4 times a day watery usually after meal   She has been evaluated by GI, her diarrhea is considered due to combination of IBS and carcinoid tumor  Patient is a nonsmoker, drinks alcohol socially  9/13/2019 :  Patient came in for follow-up  Reported after surgery has recovered very well, bowel movement back to normal   No fatigue, no sweating no flushing  12/19/2019 :  Came for follow-up  Reported doing okay, has some very mild intermittent right upper quadrant abdominal pain however normal skin color no nausea vomiting no diarrhea  Tolerating injection, no other issues  6/25/2020 :  Came for follow-up, overall doing well  Patient with recently diagnosed COVID with her  as well  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  Recovered well, never need hospitalization  Has some intermittent diarrhea, nothing concerning  No skin color change no abdominal pain distension    9/28/2020 :  Patient came in for follow-up doing well, has some mild diarrhea, otherwise no major issues  Body weight stable, no flashing           Problem list:       Patient Active Problem List   Diagnosis    Chronic bilateral low back pain    Depression    Scoliosis of thoracic spine    Carcinoid tumor of ileum    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 ([facility] [address] )    Other long term (current) drug therapy         PHYSICIAL EXAMINATION:     Vital Signs:   [unfilled]  Body mass index is 27 76 kg/m²  Body surface area is 1 88 meters squared  No major findings on Physical examination      GEN: Alert, awake oriented x3, in no acute distress  HEENT- No pallor, icterus, cyanosis, no oral mucosal lesions,   LAD - no palpable cervical, clavicle, axillary, inguinal LAD  Heart- normal S1 S2, regular rate and rhythm, No murmur, rubs  Lungs- decreased breathing sound bilateral    Abdomen- soft, Non tender, bowel sounds present  Extremities- No cyanosis, clubbing, edema  Neuro- No focal neurological deficit           PAST MEDICAL HISTORY:   has a past medical history of Cancer [facility]), Psychiatric disorder, Scoliosis, Strep throat, and Weight gain  PAST SURGICAL HISTORY:   has a past surgical history that includes Partial hysterectomy; Tubal ligation; Hysterectomy; Breast biopsy; Breast lumpectomy; pr abltj 1/> lvr bandar prq rf (N/A, 8/22/2019); COLECTOMY TOTAL (Right, 8/22/2019); Cholecystectomy (N/A, 8/22/2019); and LAPAROTOMY (N/A, 8/22/2019)  CURRENT MEDICATIONS:     Current Outpatient Medications   Medication Sig Dispense Refill    Brexpiprazole (REXULTI) 0 5 MG tablet Take 0 5 mg by mouth daily at bedtime       citalopram (CeleXA) 20 mg tablet Take 1 tablet (20 mg total) by mouth daily (Patient taking differently: Take 20 mg by mouth daily at bedtime ) 30 tablet 2    octreotide (SandoSTATIN LAR DEPOT) 10 mg IM injection kit Inject 10 mg into a muscle every 28 days       No current facility-administered medications for this visit  [unfilled]    SOCIAL HISTORY:   reports that she has never smoked  She has never used smokeless tobacco  She reports previous alcohol use  She reports previous drug use  FAMILY HISTORY:  family history includes Multiple myeloma (age of onset: 68) in her father;  Thyroid cancer (age of onset: 76) in her mother  ALLERGIES:  is allergic to prednisone  REVIEW OF SYSTEMS:  Please note that a 14-point review of systems was performed to include Constitutional, HEENT, Respiratory, CVS, GI, , Musculoskeletal, Integumentary, Neurologic, Rheumatologic, Endocrinologic, Psychiatric, Lymphatic, and Hematologic/Oncologic systems were reviewed and are negative unless otherwise stated in HPI  Positive and negative findings pertinent to this evaluation are incorporated into the history of present illness  Lab Results   Component Value Date    SODIUM 139 09/15/2020    K 4 1 09/15/2020     09/15/2020    CO2 29 09/15/2020    AGAP 8 09/15/2020    BUN 18 09/15/2020    CREATININE 0 83 09/15/2020    GLUC 123 04/27/2020    GLUF 90 09/15/2020    CALCIUM 9 9 09/15/2020    AST 28 09/15/2020    ALT 95 (H) 09/15/2020    ALKPHOS 88 09/15/2020    TP 8 2 09/15/2020    TBILI 0 20 09/15/2020    EGFR 78 09/15/2020       CBC with diff:         Invalid input(s):  RBC, TOTALCELLSCOUNTED, SEGS%, GRANS%, LYMPHS%, EOS%, BASO%, ABNEUT, ABGRANS, ABLYMPHS, ABMOMOS, ABEOS, ABBASO    CMP:        Invalid input(s): ALBUMIN    IMAGING:    CT abdomen pelvis w contrast    (Results Pending)     Ct Small Bowel Enterography    Result Date: 6/20/2019  Narrative: CT ABDOMEN AND PELVIS WITH IV CONTRAST- ENTEROGRAPHY - WITH CONTRAST INDICATION:   D3A 012: Benign carcinoid tumor of the ileum  COMPARISON:  None  TECHNIQUE:  Contrast-enhanced CT examination of the abdomen and pelvis was performed utilizing thin section technique and after the administration of low density enteric contrast according to protocol designed specifically to obtain sensitive evaluation of the small bowel  Axial, sagittal, and coronal 2D reformatted images were created from the source data and submitted for interpretation  Radiation dose length product (DLP) for this visit:  374 7 mGy-cm     This examination, like all CT scans performed in the [facility], was performed utilizing techniques to minimize radiation dose exposure, including the use of iterative reconstruction and automated exposure control  IV Contrast:  100 mL of iohexol (OMNIPAQUE) Enteric Contrast:  1500 cc of VoLumen enteric contrast was administered  FINDINGS: ABDOMEN SMALL BOWEL: There is an enhancing nodule in the terminal ileum, adjacent to the ileocecal valve, measuring 1 9 cm  Presumably this corresponds with the known neoplasm  There is no small bowel wall thickening or obstruction  LARGE BOWEL:  Normal caliber  No focal wall thickening or pericolonic inflammatory change  There is sigmoid diverticulosis without diverticulitis  STOMACH:  Unremarkable  LOWER CHEST:  No clinically significant abnormality identified in the visualized lower chest  LIVER/BILIARY TREE:  Ill-defined enhancing lesion in the right hepatic lobe posterior segment measuring approximately 2 3 x 1 6 cm on image 2/42  Additional 1 2 cm enhancing focus at the lateral margin of the right hepatic lobe at the same level, on image 2/37  Additional enhancing lesions in the posterior segment more inferiorly, measuring 1 2 x 1 4 cm on image 2/59, and 1 8 x 1 7 cm on image 2/79  GALLBLADDER:  No calcified gallstones  No pericholecystic inflammatory change  SPLEEN:  Unremarkable  PANCREAS:  Unremarkable  ADRENAL GLANDS:  Unremarkable  KIDNEYS/URETERS:  Indeterminate 1 0 cm hypodense nodule at the lower pole of the right kidney posteriorly on image 2/106  No hydronephrosis or calculi  ABDOMINOPELVIC CAVITY: Enhancing nodule with possible necrotic component in the right lower quadrant mesentery, measuring 2 6 x 1 9 cm in short axis, suspicious for metastatic adenopathy  No ascites  No pneumoperitoneum  VESSELS:  Unremarkable for patient's age  PELVIS REPRODUCTIVE ORGANS:  Patient is status post hysterectomy  URINARY BLADDER:  Unremarkable  ABDOMINAL WALL/INGUINAL REGIONS:  Unremarkable   OSSEOUS STRUCTURES:  No acute fracture or destructive osseous lesion  Impression: 1 9 cm nodule in the terminal ileum, presumably corresponding with known neoplasm  There is a 2 6 cm right lower quadrant mesenteric nodule which is suspicious for metastatic disease  Multiple enhancing lesions in the right hepatic lobe, concerning for metastatic disease  Suggest hepatic protocol MRI for further evaluation  Indeterminate 1 0 cm hypodense right renal nodule  Suggest ultrasound for further evaluation  The study was marked in EPIC for significant notification  Workstation performed: [id number]     Nm Pet Ct Skull Base To Mid Thigh    Result Date: 6/24/2019  Narrative: PET/CT SCAN INDICATION: Suspected carcinoid tumor of the terminal ileum  Initial staging  D3A 012: Benign carcinoid tumor of the ileum MODIFIER: PI COMPARISON: CT small bowel enterography 6/18/2019 CELL TYPE:  preliminary pathology of minute group of epithelial cells with neuroendocrine differentiation of uncertain significance (bx terminal ileum polyp 6/7/19) TECHNIQUE:   8 6 mCi F-18-FDG administered IV  Multiplanar attenuation corrected and non attenuation corrected PET images are available for interpretation, and contiguous, low dose, axial CT sections were obtained from the skull base through the femurs   Intravenous contrast material was not utilized  This examination, like all CT scans performed in the [facility], was performed utilizing techniques to minimize radiation dose exposure, including the use of iterative reconstruction and automated exposure control  Fasting serum glucose: 95 mg/dl FINDINGS: VISUALIZED BRAIN:   No acute abnormalities are seen  HEAD/NECK:   There is a physiologic distribution of FDG  Fairly symmetric FDG uptake in Waldeyer's ring is likely physiologic  No FDG avid cervical adenopathy is seen  CT images: Unremarkable  CHEST:   No FDG avid soft tissue lesions are seen  CT images: Unremarkable  ABDOMEN/PELVIS:  Mild focal FDG uptake at the terminal ileum, SUV max of 3 1 where there is a known lesion on prior CT  The activity is similar to normal bowel activity  Scattered vague hypodense lesions in the liver, not FDG avid above liver background, SUV max of 3 1  These are better seen on the prior contrast CT where they were hyperdense and enhancing  Enlarged right mesenteric lymph node again noted, with mild FDG uptake, SUV max of 3 0  This measures 2 6 x 2 0 cm image 179 series 3  Linear FDG uptake at the right hip may be physiologic or related to tendinitis  CT images: Otherwise unremarkable  OSSEOUS STRUCTURES: No FDG avid lesions are seen  CT images: S-shaped scoliosis noted of the thoracolumbar spine  Scattered multilevel degenerative changes of the spine  Impression: 1  Mild FDG uptake in the region of the known terminal ileal lesion and in the right mesenteric lymph node  Findings favor low-grade neoplasm  2  The scattered hepatic lesions do not demonstrate significant FDG uptake above liver background   3   Given the pathology findings of suspected carcinoid disease, gallium-68 Dotatate scan may be a more sensitive test  Workstation performed: [id number]

## 2020-10-16 ENCOUNTER — HOSPITAL ENCOUNTER (OUTPATIENT)
Dept: INFUSION CENTER | Facility: CLINIC | Age: 60
Discharge: HOME | End: 2020-10-16
Payer: COMMERCIAL

## 2020-10-16 VITALS — TEMPERATURE: 96.6 F

## 2020-10-16 DIAGNOSIS — D3A.012 CARCINOID TUMOR OF ILEUM, UNSPECIFIED WHETHER MALIGNANT: Primary | ICD-10-CM

## 2020-10-16 DIAGNOSIS — C7B.02 METASTATIC MALIGNANT CARCINOID TUMOR TO LIVER (HCC): Primary | ICD-10-CM

## 2020-10-16 PROCEDURE — 96372 THER/PROPH/DIAG INJ SC/IM: CPT

## 2020-10-16 RX ADMIN — OCTREOTIDE ACETATE 30 MG: KIT at 15:43

## 2020-11-03 ENCOUNTER — PATIENT OUTREACH (OUTPATIENT)
Dept: CASE MANAGEMENT | Facility: HOSPITAL | Age: 60
End: 2020-11-03

## 2020-11-11 ENCOUNTER — PATIENT OUTREACH (OUTPATIENT)
Dept: CASE MANAGEMENT | Facility: HOSPITAL | Age: 60
End: 2020-11-11

## 2020-11-12 ENCOUNTER — HOSPITAL ENCOUNTER (OUTPATIENT)
Dept: INFUSION CENTER | Facility: CLINIC | Age: 60
Discharge: HOME | End: 2020-11-12
Payer: COMMERCIAL

## 2020-11-12 VITALS — TEMPERATURE: 96.4 F

## 2020-11-12 DIAGNOSIS — D3A.012 CARCINOID TUMOR OF ILEUM, UNSPECIFIED WHETHER MALIGNANT: Primary | ICD-10-CM

## 2020-11-12 PROCEDURE — 96372 THER/PROPH/DIAG INJ SC/IM: CPT

## 2020-11-12 RX ADMIN — OCTREOTIDE ACETATE 30 MG: KIT at 15:56

## 2020-12-10 ENCOUNTER — HOSPITAL ENCOUNTER (OUTPATIENT)
Dept: INFUSION CENTER | Facility: CLINIC | Age: 60
Discharge: HOME | End: 2020-12-10
Payer: COMMERCIAL

## 2020-12-10 DIAGNOSIS — D3A.012 CARCINOID TUMOR OF ILEUM, UNSPECIFIED WHETHER MALIGNANT: Primary | ICD-10-CM

## 2020-12-10 PROCEDURE — 96372 THER/PROPH/DIAG INJ SC/IM: CPT

## 2020-12-10 RX ADMIN — OCTREOTIDE ACETATE 30 MG: KIT at 15:48

## 2020-12-14 ENCOUNTER — LAB (OUTPATIENT)
Dept: LAB | Facility: HOSPITAL | Age: 60
End: 2020-12-14
Attending: INTERNAL MEDICINE
Payer: COMMERCIAL

## 2020-12-16 ENCOUNTER — HOSPITAL ENCOUNTER (OUTPATIENT)
Dept: CT IMAGING | Facility: HOSPITAL | Age: 60
Discharge: HOME | End: 2020-12-16
Attending: INTERNAL MEDICINE
Payer: COMMERCIAL

## 2020-12-16 DIAGNOSIS — D3A.012 CARCINOID TUMOR OF ILEUM, UNSPECIFIED WHETHER MALIGNANT: ICD-10-CM

## 2020-12-16 DIAGNOSIS — C7B.02 METASTATIC MALIGNANT CARCINOID TUMOR TO LIVER (HCC): ICD-10-CM

## 2020-12-16 PROCEDURE — 74177 CT ABD & PELVIS W/CONTRAST: CPT

## 2020-12-16 PROCEDURE — G1004 CDSM NDSC: HCPCS

## 2020-12-16 RX ADMIN — IOHEXOL 100 ML: 350 INJECTION, SOLUTION INTRAVENOUS at 13:35

## 2020-12-21 ENCOUNTER — OFFICE VISIT (OUTPATIENT)
Dept: HEMATOLOGY ONCOLOGY | Facility: CLINIC | Age: 60
End: 2020-12-21
Payer: COMMERCIAL

## 2020-12-21 VITALS
WEIGHT: 175 LBS | HEIGHT: 66 IN | DIASTOLIC BLOOD PRESSURE: 74 MMHG | SYSTOLIC BLOOD PRESSURE: 128 MMHG | BODY MASS INDEX: 28.12 KG/M2 | HEART RATE: 61 BPM | OXYGEN SATURATION: 97 % | TEMPERATURE: 97.9 F | RESPIRATION RATE: 18 BRPM

## 2020-12-21 DIAGNOSIS — C7B.02 METASTATIC MALIGNANT CARCINOID TUMOR TO LIVER (HCC): Primary | ICD-10-CM

## 2020-12-21 PROCEDURE — 3008F BODY MASS INDEX DOCD: CPT | Performed by: INTERNAL MEDICINE

## 2020-12-21 PROCEDURE — 1036F TOBACCO NON-USER: CPT | Performed by: INTERNAL MEDICINE

## 2020-12-21 PROCEDURE — 99214 OFFICE O/P EST MOD 30 MIN: CPT | Performed by: INTERNAL MEDICINE

## 2021-01-06 ENCOUNTER — TELEPHONE (OUTPATIENT)
Dept: SURGICAL ONCOLOGY | Facility: CLINIC | Age: 61
End: 2021-01-06

## 2021-01-07 ENCOUNTER — HOSPITAL ENCOUNTER (OUTPATIENT)
Dept: INFUSION CENTER | Facility: CLINIC | Age: 61
Discharge: HOME | End: 2021-01-07
Payer: COMMERCIAL

## 2021-01-07 ENCOUNTER — OFFICE VISIT (OUTPATIENT)
Dept: SURGICAL ONCOLOGY | Facility: CLINIC | Age: 61
End: 2021-01-07
Payer: COMMERCIAL

## 2021-01-07 VITALS
WEIGHT: 176 LBS | BODY MASS INDEX: 28.28 KG/M2 | SYSTOLIC BLOOD PRESSURE: 130 MMHG | TEMPERATURE: 97.8 F | RESPIRATION RATE: 18 BRPM | HEIGHT: 66 IN | DIASTOLIC BLOOD PRESSURE: 78 MMHG | HEART RATE: 64 BPM

## 2021-01-07 VITALS — TEMPERATURE: 96.7 F

## 2021-01-07 DIAGNOSIS — C7B.02 METASTATIC MALIGNANT CARCINOID TUMOR TO LIVER (HCC): Primary | ICD-10-CM

## 2021-01-07 DIAGNOSIS — D3A.012 CARCINOID TUMOR OF ILEUM, UNSPECIFIED WHETHER MALIGNANT: Primary | ICD-10-CM

## 2021-01-07 DIAGNOSIS — D3A.012 CARCINOID TUMOR OF ILEUM, UNSPECIFIED WHETHER MALIGNANT: ICD-10-CM

## 2021-01-07 PROCEDURE — 3008F BODY MASS INDEX DOCD: CPT | Performed by: SURGERY

## 2021-01-07 PROCEDURE — 96372 THER/PROPH/DIAG INJ SC/IM: CPT

## 2021-01-07 PROCEDURE — 1036F TOBACCO NON-USER: CPT | Performed by: SURGERY

## 2021-01-07 PROCEDURE — 99213 OFFICE O/P EST LOW 20 MIN: CPT | Performed by: SURGERY

## 2021-01-07 RX ADMIN — OCTREOTIDE ACETATE 30 MG: KIT at 16:01

## 2021-01-07 NOTE — LETTER
January 7, 2021     Jasen Burroughs MD  [address]  [facility]  [address]    Patient: Jose D Lagunas   YOB: 1960   Date of Visit: 1/7/2021       Dear Dr Rosa Reyes: Thank you for referring Amarjit Thomson to me for evaluation  Below are my notes for this consultation  If you have questions, please do not hesitate to call me  I look forward to following your patient along with you  Sincerely,        Vance Hernandez MD        CC: Elaine Mitchell MD PhD  Vance Hernandez MD  1/7/2021  3:40 PM  Sign when Signing Visit               Surgical Oncology Follow Up       [address] [place]  1960  [id number]  [place]  41  [facility] [place]  [facility]  [address]  [facility] [address]    Diagnoses and all orders for this visit:    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 [facility])  -     Chromogranin A; Future    Carcinoid tumor of ileum, unspecified whether malignant        Chief Complaint   Patient presents with    Follow-up       Return in about 6 months (around 7/7/2021) for Office Visit, Labs - See Treatment Plan  Oncology History   Carcinoid tumor of ileum   6/7/2019 Initial Diagnosis    Carcinoid tumor of ileum     8/22/2019 Surgery    Right hemicolectomy, cholecystectomy  - 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)  - Lymphovascular and perineural invasion are present  T3N1M1  G1  3/24 LN         Staging:  Metastatic neuroendocrine tumor of the ileum, [id number]   August 2019  Treatment history:  Right hemicolectomy, intraoperative ultrasound of the liver and microwave ablation of four liver lesions (medial right lobe, segment 7, segment 6/7, segment 6)    Current treatment:  Octreotide   Disease status: ANIYA    History of Present Illness:  Patient returns in follow-up of her metastatic neuroendocrine tumor  She is doing well at this time with no complaints  She denies any abdominal pain, nausea or vomiting  No flushing, diarrhea, palpitations, headaches sweats  Her bowel movements have essentially returned to normal   She has had a 7 lb intentional weight loss through intermittent fasting  She continues octreotide  Chromogranin A level is 12 3  CT from December 16, 2020 reveals stable liver lesions  The ablation zones are stable to slightly improved  I personally reviewed the films  Review of Systems  Complete ROS Surg Onc:   Complete ROS Surg Onc:   Constitutional: The patient denies new or recent history of general fatigue,  no change in appetite  Weight loss  Eyes: No complaints of visual problems, no scleral icterus  ENT: no complaints of ear pain, no hoarseness, no difficulty swallowing,  no tinnitus and no new masses in head, oral cavity, or neck  Cardiovascular: No complaints of chest pain, no palpitations, no ankle edema  Respiratory: No complaints of shortness of breath, no cough  Gastrointestinal: No complaints of jaundice, no bloody stools, no pale stools  Genitourinary: No complaints of dysuria, no hematuria, no nocturia, no frequent urination, no urethral discharge  Musculoskeletal: No complaints of weakness, paralysis, joint stiffness or arthralgias  Integumentary: No complaints of rash, no new lesions  Neurological: No complaints of convulsions, no seizures, no dizziness  Hematologic/Lymphatic: No complaints of easy bruising  Endocrine:  No hot or cold intolerance  No polydipsia, polyphagia, or polyuria  Allergy/immunology:  No environmental allergies  No food allergies  Not immunocompromised  Skin:  No pallor or rash  No wound          Patient Active Problem List   Diagnosis    Chronic bilateral low back pain    Depression    Scoliosis of thoracic spine    Carcinoid tumor of ileum    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 ([facility] [address] )    Other long term (current) drug therapy     Past Medical History:   Diagnosis Date    Cancer [facility])     neuroendocrine tumor    Psychiatric disorder     Scoliosis     Strep throat     Weight gain      Past Surgical History:   Procedure Laterality Date    BREAST BIOPSY      BREAST LUMPECTOMY      benign    CHOLECYSTECTOMY N/A 8/22/2019    Procedure: CHOLECYSTECTOMY;  Surgeon: Juan C Crowder MD;  [facility];  Service: Surgical Oncology    COLECTOMY TOTAL Right 8/22/2019    Procedure: HEMICOLECTOMY;  Surgeon: Juan C Crowder MD;  [facility];  Service: Surgical Oncology    HYSTERECTOMY      LAPAROTOMY N/A 8/22/2019    Procedure: LAPAROTOMY EXPLORATORY;  Surgeon: Juan C Crowder MD;  [facility];  Service: Surgical Oncology    PARTIAL HYSTERECTOMY      SC ABLTJ 1/> LVR BRYNN PRQ RF N/A 8/22/2019    Procedure: ABLATION MICROWAVE; INTRAOPERATIVE ULTRASOUND OF THE LIVER;  Surgeon: Juan C Crowder MD;  [facility];  Service: Surgical Oncology    TUBAL LIGATION       Family History   Problem Relation Age of Onset    Thyroid cancer Mother 76    Multiple myeloma Father 68     Social History     Socioeconomic History    Marital status:      Spouse name: Not on file    Number of children: Not on file    Years of education: Not on file    Highest education level: Not on file   Occupational History    Not on file   Social Needs    Financial resource strain: Not on file    Food insecurity     Worry: Not on file     Inability: Not on file   [employer] needs     Medical: Not on file     Non-medical: Not on file   Tobacco Use    Smoking status: Never Smoker    Smokeless tobacco: Never Used   Substance and Sexual Activity    Alcohol use: Not Currently     Frequency: Never     Binge frequency: Never    Drug use: Not Currently    Sexual activity: Yes   Lifestyle    Physical activity     Days per week: Not on file     Minutes per session: Not on file    Stress: Not on file   Relationships    Social connections     Talks on phone: Not on file     Gets together: Not on file     Attends Adventist service: Not on file     Active member of club or organization: Not on file     Attends meetings of clubs or organizations: Not on file     Relationship status: Not on file    Intimate partner violence     Fear of current or ex partner: Not on file     Emotionally abused: Not on file     Physically abused: Not on file     Forced sexual activity: Not on file   Other Topics Concern    Not on file   Social History Narrative      2 sons    2 Grandkids       Current Outpatient Medications:     Brexpiprazole (REXULTI) 0 5 MG tablet, Take 0 5 mg by mouth daily at bedtime , Disp: , Rfl:     citalopram (CeleXA) 20 mg tablet, Take 1 tablet (20 mg total) by mouth daily (Patient taking differently: Take 20 mg by mouth daily at bedtime ), Disp: 30 tablet, Rfl: 2    octreotide (SandoSTATIN LAR DEPOT) 10 mg IM injection kit, Inject 10 mg into a muscle every 28 days, Disp: , Rfl:   Allergies   Allergen Reactions    Prednisone Other (See Comments) and Confusion     Psychosis      Vitals:    01/07/21 1512   BP: 130/78   Pulse: 64   Resp: 18   Temp: 97 8 °F (36 6 °C)       Physical Exam  Constitutional: General appearance: The Patient is well-developed and well-nourished who appears the stated age in no acute distress  Patient is pleasant and talkative  HEENT:  Normocephalic  Sclerae are anicteric  Mucous membranes are moist  Neck is supple without adenopathy  No JVD  Chest: The lungs are clear to auscultation  Cardiac: Heart is regular rate  Abdomen: Abdomen is soft, non-tender, non-distended and without masses  Extremities: There is no clubbing or cyanosis  There is no edema  Symmetric  Neuro: Grossly nonfocal  Gait is normal      Lymphatic: No evidence of cervical adenopathy bilaterally  No evidence of axillary adenopathy bilaterally  Skin: Warm, anicteric  Psych:  Patient is pleasant and talkative  Breasts:        Pathology:  [unfilled]    Labs:   Ref Range & Units 12/14/20 3:44 PM   Chromogranin A 0 0 - 101 8 ng/mL 12 3         Imaging  Ct Abdomen Pelvis W Contrast    Result Date: 12/17/2020  Narrative: CT ABDOMEN AND PELVIS WITH IV CONTRAST INDICATION:  Restaging  Carcinoid tumor of the ileum metastatic to liver  COMPARISON:  CT abdomen and pelvis 9/21/2020 TECHNIQUE:  CT examination of the abdomen and pelvis was performed  Axial, sagittal, and coronal 2D reformatted images were created from the source data and submitted for interpretation  Radiation dose length product (DLP) for this visit:  675 mGy-cm   This examination, like all CT scans performed in the [facility], was performed utilizing techniques to minimize radiation dose exposure, including the use of iterative reconstruction and automated exposure control  IV Contrast:  100 mL of iohexol (OMNIPAQUE) Enteric Contrast:  Enteric contrast was administered  FINDINGS: LOWER CHEST:  Mild dependent subsegmental atelectatic changes  LIVER/BILIARY TREE:  The liver is enlarged measuring 18 cm craniocaudad at the midclavicular line, similar to prior  Hepatic steatosis  No significant interval change in multiple hepatic metastases with representative examples as follows: - Hypoattenuating lesion in segment 7/8 measuring 1 5 x 1 8 cm (series 601 image 74), previously 1 9 x 1 5 cm when measured similarly (series 601 image 69 of the prior study)  - Ill-defined mixed attenuation lesion in segment 7 measuring 1 8 x 1 8 cm (series 2 image 24), grossly similar to the prior study - Hypoattenuating lesion in segment 6 measuring approximately 1 9 cm (series 601 image 98), grossly unchanged  - Additional scattered ill-defined hyperattenuating foci for example 6 mm focus in segment 8 (series 601 image 70) and 10 mm focus in segment 7 (series 601 image 122) are grossly similar to prior   The hepatic and portal veins are patent  No biliary ductal dilatation  GALLBLADDER:  Post cholecystectomy  SPLEEN:  At the upper limit of normal in size measuring 12 cm in coronal plane, similar to prior  PANCREAS:  Unremarkable  ADRENAL GLANDS:  Unremarkable  KIDNEYS/URETERS: No hydroureteronephrosis  Redemonstrated bilateral subcentimeter hypoattenuating lesions, too small to further characterize  Similar appearance of 10 mm lesion in the right lower pole central hyperattenuating focus (series 601 image 83), remains indeterminate  STOMACH AND BOWEL:  Postsurgical changes from right hemicolectomy  No disproportionate dilatation of small or large bowel  Colonic diverticulosis without findings of acute diverticulitis  ABDOMINOPELVIC CAVITY:  No ascites  No pneumoperitoneum  No lymphadenopathy  VESSELS:  No abdominal aortic aneurysm  PELVIS REPRODUCTIVE ORGANS:  Post hysterectomy  URINARY BLADDER:  Unremarkable  ABDOMINAL WALL/INGUINAL REGIONS:  Postsurgical changes of the anterior abdominal wall  Interval enlargement of right periumbilical ventral hernia containing nonobstructed transverse colon  Redemonstrated scattered densities and foci of fat stranding in  bilateral buttocks likely due to injection granulomata  OSSEOUS STRUCTURES:  No acute fracture or destructive osseous lesion  Levoscoliosis  Impression: 1  No significant interval change in multiple hepatic metastases compared to prior CT 9/21/2020  No new sites of abdominopelvic metastasis  2   Similar appearance of indeterminate 10 mm lesion in the right kidney lower pole  Attention on follow-up  Workstation performed: [id number]     I reviewed the above laboratory and imaging data  Discussion/Summary: 64-year-old female status post right hemicolectomy, and microwave ablation of four right-sided liver lesions for a [id number] neuroendocrine tumor ileum   She is clinically ANIYA at nearly 1-1/2 years  She is doing very well   She is on octreotide    She is getting every 4 months imaging  I will see her again in 6 months with a chromogranin level  I will use medical oncology's imaging  She is agreeable to this plan   All her questions were answered

## 2021-01-07 NOTE — PROGRESS NOTES
Surgical Oncology Follow Up       Vinny  42  [facility]  [facility]  [address]  [place]  1960  [id number]  [place]inny  42  [facility]  [facility]  [address]  [facility]  [place] [address]    Diagnoses and all orders for this visit:    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 [facility])  -     Chromogranin A; Future    Carcinoid tumor of ileum, unspecified whether malignant        Chief Complaint   Patient presents with    Follow-up       Return in about 6 months (around 7/7/2021) for Office Visit, Labs - See Treatment Plan  Oncology History   Carcinoid tumor of ileum   6/7/2019 Initial Diagnosis    Carcinoid tumor of ileum     8/22/2019 Surgery    Right hemicolectomy, cholecystectomy  - 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)  - Lymphovascular and perineural invasion are present  T3N1M1  G1  3/24 LN         Staging:  Metastatic neuroendocrine tumor of the ileum, [id number]   August 2019  Treatment history:  Right hemicolectomy, intraoperative ultrasound of the liver and microwave ablation of four liver lesions (medial right lobe, segment 7, segment 6/7, segment 6)  Current treatment:  Octreotide   Disease status: ANIYA    History of Present Illness:  Patient returns in follow-up of her metastatic neuroendocrine tumor  She is doing well at this time with no complaints  She denies any abdominal pain, nausea or vomiting  No flushing, diarrhea, palpitations, headaches sweats  Her bowel movements have essentially returned to normal   She has had a 7 lb intentional weight loss through intermittent fasting  She continues octreotide  Chromogranin A level is 12 3  CT from December 16, 2020 reveals stable liver lesions  The ablation zones are stable to slightly improved  I personally reviewed the films      Review of Systems  Complete ROS Surg Onc:   Complete ROS Surg Onc:   Constitutional: The patient denies new or recent history of general fatigue,  no change in appetite  Weight loss  Eyes: No complaints of visual problems, no scleral icterus  ENT: no complaints of ear pain, no hoarseness, no difficulty swallowing,  no tinnitus and no new masses in head, oral cavity, or neck  Cardiovascular: No complaints of chest pain, no palpitations, no ankle edema  Respiratory: No complaints of shortness of breath, no cough  Gastrointestinal: No complaints of jaundice, no bloody stools, no pale stools  Genitourinary: No complaints of dysuria, no hematuria, no nocturia, no frequent urination, no urethral discharge  Musculoskeletal: No complaints of weakness, paralysis, joint stiffness or arthralgias  Integumentary: No complaints of rash, no new lesions  Neurological: No complaints of convulsions, no seizures, no dizziness  Hematologic/Lymphatic: No complaints of easy bruising  Endocrine:  No hot or cold intolerance  No polydipsia, polyphagia, or polyuria  Allergy/immunology:  No environmental allergies  No food allergies  Not immunocompromised  Skin:  No pallor or rash  No wound          Patient Active Problem List   Diagnosis    Chronic bilateral low back pain    Depression    Scoliosis of thoracic spine    Carcinoid tumor of ileum    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 ([address] )    Other long term (current) drug therapy     Past Medical History:   Diagnosis Date    Cancer [facility])     neuroendocrine tumor    Psychiatric disorder     Scoliosis     Strep throat     Weight gain      Past Surgical History:   Procedure Laterality Date    BREAST BIOPSY      BREAST LUMPECTOMY      benign    CHOLECYSTECTOMY N/A 8/22/2019    Procedure: CHOLECYSTECTOMY;  Surgeon: Tressia Pallas, MD;  [facility];  Service: Surgical Oncology    COLECTOMY TOTAL Right 8/22/2019    Procedure: HEMICOLECTOMY;  Surgeon: Tressia Pallas, MD; Location[facility];  Service: Surgical Oncology    HYSTERECTOMY      LAPAROTOMY N/A 8/22/2019    Procedure: LAPAROTOMY EXPLORATORY;  Surgeon: Denver Romance, MD;  [facility];  Service: Surgical Oncology    PARTIAL HYSTERECTOMY      AR ABLTJ 1/> LVR BRYNN PRQ RF N/A 8/22/2019    Procedure: ABLATION MICROWAVE; INTRAOPERATIVE ULTRASOUND OF THE LIVER;  Surgeon: Denver Romance, MD;  [facility];  Service: Surgical Oncology    TUBAL LIGATION       Family History   Problem Relation Age of Onset    Thyroid cancer Mother 76    Multiple myeloma Father 68     Social History     Socioeconomic History    Marital status:      Spouse name: Not on file    Number of children: Not on file    Years of education: Not on file    Highest education level: Not on file   Occupational History    Not on file   Social Needs    Financial resource strain: Not on file    Food insecurity     Worry: Not on file     Inability: Not on file   [employer] needs     Medical: Not on file     Non-medical: Not on file   Tobacco Use    Smoking status: Never Smoker    Smokeless tobacco: Never Used   Substance and Sexual Activity    Alcohol use: Not Currently     Frequency: Never     Binge frequency: Never    Drug use: Not Currently    Sexual activity: Yes   Lifestyle    Physical activity     Days per week: Not on file     Minutes per session: Not on file    Stress: Not on file   Relationships    Social connections     Talks on phone: Not on file     Gets together: Not on file     Attends Taoist service: Not on file     Active member of club or organization: Not on file     Attends meetings of clubs or organizations: Not on file     Relationship status: Not on file    Intimate partner violence     Fear of current or ex partner: Not on file     Emotionally abused: Not on file     Physically abused: Not on file     Forced sexual activity: Not on file   Other Topics Concern    Not on file   Social History Narrative      2 sons    2 Grandkids       Current Outpatient Medications:     Brexpiprazole (REXULTI) 0 5 MG tablet, Take 0 5 mg by mouth daily at bedtime , Disp: , Rfl:     citalopram (CeleXA) 20 mg tablet, Take 1 tablet (20 mg total) by mouth daily (Patient taking differently: Take 20 mg by mouth daily at bedtime ), Disp: 30 tablet, Rfl: 2    octreotide (SandoSTATIN LAR DEPOT) 10 mg IM injection kit, Inject 10 mg into a muscle every 28 days, Disp: , Rfl:   Allergies   Allergen Reactions    Prednisone Other (See Comments) and Confusion     Psychosis      Vitals:    01/07/21 1512   BP: 130/78   Pulse: 64   Resp: 18   Temp: 97 8 °F (36 6 °C)       Physical Exam  Constitutional: General appearance: The Patient is well-developed and well-nourished who appears the stated age in no acute distress  Patient is pleasant and talkative  HEENT:  Normocephalic  Sclerae are anicteric  Mucous membranes are moist  Neck is supple without adenopathy  No JVD  Chest: The lungs are clear to auscultation  Cardiac: Heart is regular rate  Abdomen: Abdomen is soft, non-tender, non-distended and without masses  Extremities: There is no clubbing or cyanosis  There is no edema  Symmetric  Neuro: Grossly nonfocal  Gait is normal      Lymphatic: No evidence of cervical adenopathy bilaterally  No evidence of axillary adenopathy bilaterally  Skin: Warm, anicteric  Psych:  Patient is pleasant and talkative  Breasts:        Pathology:  [unfilled]    Labs:   Ref Range & Units 12/14/20 3:44 PM   Chromogranin A 0 0 - 101 8 ng/mL 12 3         Imaging  Ct Abdomen Pelvis W Contrast    Result Date: 12/17/2020  Narrative: CT ABDOMEN AND PELVIS WITH IV CONTRAST INDICATION:  Restaging  Carcinoid tumor of the ileum metastatic to liver  COMPARISON:  CT abdomen and pelvis 9/21/2020 TECHNIQUE:  CT examination of the abdomen and pelvis was performed   Axial, sagittal, and coronal 2D reformatted images were created from the source data and submitted for interpretation  Radiation dose length product (DLP) for this visit:  675 mGy-cm   This examination, like all CT scans performed in the [facility], was performed utilizing techniques to minimize radiation dose exposure, including the use of iterative reconstruction and automated exposure control  IV Contrast:  100 mL of iohexol (OMNIPAQUE) Enteric Contrast:  Enteric contrast was administered  FINDINGS: LOWER CHEST:  Mild dependent subsegmental atelectatic changes  LIVER/BILIARY TREE:  The liver is enlarged measuring 18 cm craniocaudad at the midclavicular line, similar to prior  Hepatic steatosis  No significant interval change in multiple hepatic metastases with representative examples as follows: - Hypoattenuating lesion in segment 7/8 measuring 1 5 x 1 8 cm (series 601 image 74), previously 1 9 x 1 5 cm when measured similarly (series 601 image 69 of the prior study)  - Ill-defined mixed attenuation lesion in segment 7 measuring 1 8 x 1 8 cm (series 2 image 24), grossly similar to the prior study - Hypoattenuating lesion in segment 6 measuring approximately 1 9 cm (series 601 image 98), grossly unchanged  - Additional scattered ill-defined hyperattenuating foci for example 6 mm focus in segment 8 (series 601 image 70) and 10 mm focus in segment 7 (series 601 image 122) are grossly similar to prior  The hepatic and portal veins are patent  No biliary ductal dilatation  GALLBLADDER:  Post cholecystectomy  SPLEEN:  At the upper limit of normal in size measuring 12 cm in coronal plane, similar to prior  PANCREAS:  Unremarkable  ADRENAL GLANDS:  Unremarkable  KIDNEYS/URETERS: No hydroureteronephrosis  Redemonstrated bilateral subcentimeter hypoattenuating lesions, too small to further characterize  Similar appearance of 10 mm lesion in the right lower pole central hyperattenuating focus (series 601 image 83), remains indeterminate    STOMACH AND BOWEL: Postsurgical changes from right hemicolectomy  No disproportionate dilatation of small or large bowel  Colonic diverticulosis without findings of acute diverticulitis  ABDOMINOPELVIC CAVITY:  No ascites  No pneumoperitoneum  No lymphadenopathy  VESSELS:  No abdominal aortic aneurysm  PELVIS REPRODUCTIVE ORGANS:  Post hysterectomy  URINARY BLADDER:  Unremarkable  ABDOMINAL WALL/INGUINAL REGIONS:  Postsurgical changes of the anterior abdominal wall  Interval enlargement of right periumbilical ventral hernia containing nonobstructed transverse colon  Redemonstrated scattered densities and foci of fat stranding in  bilateral buttocks likely due to injection granulomata  OSSEOUS STRUCTURES:  No acute fracture or destructive osseous lesion  Levoscoliosis  Impression: 1  No significant interval change in multiple hepatic metastases compared to prior CT 9/21/2020  No new sites of abdominopelvic metastasis  2   Similar appearance of indeterminate 10 mm lesion in the right kidney lower pole  Attention on follow-up  Workstation performed: [id number]     I reviewed the above laboratory and imaging data  Discussion/Summary: 52-year-old female status post right hemicolectomy, and microwave ablation of four right-sided liver lesions for a [id number] neuroendocrine tumor ileum   She is clinically ANIYA at nearly 1-1/2 years  She is doing very well   She is on octreotide  She is getting every 4 months imaging  I will see her again in 6 months with a chromogranin level  I will use medical oncology's imaging  She is agreeable to this plan   All her questions were answered

## 2021-01-07 NOTE — PROGRESS NOTES
Pt presents for Sandostatin injection  Pt w/o complaints  Injection adminstered to L Glute  Pt tolerated well  AVS declined  Next appt reviewed

## 2021-01-07 NOTE — PLAN OF CARE
Problem: SAFETY ADULT  Goal: Patient will remain free of falls  Description: INTERVENTIONS:  - Assess patient frequently for physical needs  -  Identify cognitive and physical deficits and behaviors that affect risk of falls  -  [place] fall precautions as indicated by assessment   - Educate patient/family on patient safety including physical limitations  - Instruct patient to call for assistance with activity based on assessment  - Modify environment to reduce risk of injury  - Consider OT/PT consult to assist with strengthening/mobility  Outcome: Progressing     Problem: Knowledge Deficit  Goal: Patient/family/caregiver demonstrates understanding of disease process, treatment plan, medications, and discharge instructions  Description: Complete learning assessment and assess knowledge base    Interventions:  - Provide teaching at level of understanding  - Provide teaching via preferred learning methods  Outcome: Progressing

## 2021-02-04 ENCOUNTER — APPOINTMENT (OUTPATIENT)
Dept: URGENT CARE | Facility: MEDICAL CENTER | Age: 61
End: 2021-02-04
Payer: OTHER MISCELLANEOUS

## 2021-02-04 ENCOUNTER — HOSPITAL ENCOUNTER (OUTPATIENT)
Dept: INFUSION CENTER | Facility: CLINIC | Age: 61
Discharge: HOME | End: 2021-02-04

## 2021-02-04 PROCEDURE — G0382 LEV 3 HOSP TYPE B ED VISIT: HCPCS | Performed by: PHYSICIAN ASSISTANT

## 2021-02-04 PROCEDURE — 99283 EMERGENCY DEPT VISIT LOW MDM: CPT | Performed by: PHYSICIAN ASSISTANT

## 2021-02-08 ENCOUNTER — CONSULT (OUTPATIENT)
Dept: SURGERY | Facility: CLINIC | Age: 61
End: 2021-02-08
Payer: OTHER MISCELLANEOUS

## 2021-02-08 ENCOUNTER — TELEPHONE (OUTPATIENT)
Dept: SURGERY | Facility: CLINIC | Age: 61
End: 2021-02-08

## 2021-02-08 VITALS
TEMPERATURE: 97.2 F | SYSTOLIC BLOOD PRESSURE: 106 MMHG | DIASTOLIC BLOOD PRESSURE: 68 MMHG | WEIGHT: 171.8 LBS | HEIGHT: 66 IN | BODY MASS INDEX: 27.61 KG/M2 | HEART RATE: 74 BPM

## 2021-02-08 DIAGNOSIS — K43.2 INCISIONAL HERNIA OF ANTERIOR ABDOMINAL WALL WITHOUT OBSTRUCTION OR GANGRENE: Primary | ICD-10-CM

## 2021-02-08 PROCEDURE — 99204 OFFICE O/P NEW MOD 45 MIN: CPT | Performed by: STUDENT IN AN ORGANIZED HEALTH CARE EDUCATION/TRAINING PROGRAM

## 2021-02-08 NOTE — PROGRESS NOTES
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  - she has not had any pain or issues prior to this but did notice a lump she just thought was fat contouring  - hernia is reducible, she is having normal bowel movements and passing flatus  -  Upon review of imaging patient has a large defect lateral to her umbilicus but also multiple small defects along her incision, consistent with a Swiss cheese type hernia; this hernia has been present for the last few CT scans  - patient would require large operation with a large mesh to repair this  - at this time would recommend against fixing this hernia in the setting of metastatic carcinoid tumor with resection August 2019,  Patient is showing no progression of disease at this time,  If she were to require any other operative intervention after a large hernia repair the mesh may need to be incised or excised  - patient given abdominal binder,  Okay to return to work  -  Discussed with patient signs of incarceration or strangulation at the hernia site and to get immediate assistance if this were to occur   -  Follow up in office as needed     1  Incisional hernia of anterior abdominal wall without obstruction or gangrene               Subjective: umbilical hernia     Patient ID: Kaila Abrasm is a 62 y o  female  Triage Notes:      Patient is a 64-year-old female who presents to office for evaluation of ventral hernia  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    Chronic bilateral low back pain 07/06/2018    Depression 07/06/2018    Scoliosis of thoracic spine 07/06/2018     She  has a past surgical history that includes Partial hysterectomy; Tubal ligation; Hysterectomy; Breast biopsy; Breast lumpectomy; pr abltj 1/> lvr bandar prq rf (N/A, 8/22/2019); COLECTOMY TOTAL (Right, 8/22/2019); Cholecystectomy (N/A, 8/22/2019); and LAPAROTOMY (N/A, 8/22/2019)  Her family history includes Multiple myeloma (age of onset: 68) in her father; Thyroid cancer (age of onset: 76) in her mother  She  reports that she has never smoked  She has never used smokeless tobacco  She reports previous alcohol use  She reports previous drug use  Current Outpatient Medications on File Prior to Visit   Medication Sig    Brexpiprazole (REXULTI) 0 5 MG tablet Take 0 5 mg by mouth daily at bedtime     citalopram (CeleXA) 20 mg tablet Take 1 tablet (20 mg total) by mouth daily (Patient taking differently: Take 20 mg by mouth daily at bedtime )    octreotide (SandoSTATIN LAR DEPOT) 10 mg IM injection kit Inject 10 mg into a muscle every 28 days     No current facility-administered medications on file prior to visit  She is allergic to prednisone       Review of Systems   Constitutional: Negative for chills, fatigue and fever  HENT: Negative for congestion, hearing loss, rhinorrhea and sore throat      Eyes: Negative for pain and discharge  Respiratory: Negative for cough, chest tightness and shortness of breath  Cardiovascular: Negative for chest pain and palpitations  Gastrointestinal: Negative for abdominal pain, constipation, diarrhea, nausea and vomiting  Positive ventral hernia   Endocrine: Negative for cold intolerance and heat intolerance  Genitourinary: Negative for difficulty urinating and dysuria  Musculoskeletal: Negative for back pain and neck pain  Skin: Negative for color change and rash  Allergic/Immunologic: Negative for environmental allergies and food allergies  Neurological: Negative for seizures and headaches  Hematological: Negative for adenopathy  Does not bruise/bleed easily  Psychiatric/Behavioral: Negative for confusion and hallucinations  Objective:      /68   Pulse 74   Temp (!) 97 2 °F (36 2 °C) (Temporal)   Ht 5' 6" (1 676 m)   Wt 77 9 kg (171 lb 12 8 oz)   Breastfeeding No   BMI 27 73 kg/m²     Below is the patient's most recent value for Albumin, ALT, AST, BUN, Calcium, Chloride, Cholesterol, CO2, Creatinine, GFR, Glucose, HDL, Hematocrit, Hemoglobin, Hemoglobin A1C, LDL, Magnesium, Phosphorus, Platelets, Potassium, PSA, Sodium, Triglycerides, and WBC  Lab Results   Component Value Date     (H) 12/14/2020    AST 62 (H) 12/14/2020    BUN 19 12/14/2020    CALCIUM 9 4 12/14/2020     12/14/2020    CO2 27 12/14/2020    CREATININE 0 94 12/14/2020    HDL 45 03/02/2018    HCT 36 5 12/14/2020    HGB 12 3 12/14/2020    HGBA1C 5 6 08/01/2019    MG 2 1 08/24/2019     12/14/2020    K 4 0 12/14/2020    TRIG 123 03/02/2018    WBC 11 67 (H) 12/14/2020     Note: for a comprehensive list of the patient's lab results, access the Results Review activity  Physical Exam  Constitutional:       Appearance: Normal appearance  HENT:      Head: Normocephalic and atraumatic  Nose: Nose normal    Eyes:      General: No scleral icterus  Conjunctiva/sclera: Conjunctivae normal    Neck:      Musculoskeletal: Neck supple  Cardiovascular:      Rate and Rhythm: Normal rate and regular rhythm  Pulses: Normal pulses  Heart sounds: Normal heart sounds  Pulmonary:      Effort: Pulmonary effort is normal       Breath sounds: Normal breath sounds  Abdominal:      General: There is no distension  Palpations: Abdomen is soft  Tenderness: There is no abdominal tenderness  Comments: Reducible incisional ventral hernia to the right of the umbilicus easily reducible   Musculoskeletal:         General: No signs of injury  Skin:     General: Skin is warm  Coloration: Skin is not jaundiced  Neurological:      General: No focal deficit present  Mental Status: She is alert and oriented to person, place, and time     Psychiatric:         Mood and Affect: Mood normal          Behavior: Behavior normal

## 2021-02-09 ENCOUNTER — OFFICE VISIT (OUTPATIENT)
Dept: FAMILY MEDICINE CLINIC | Facility: CLINIC | Age: 61
End: 2021-02-09
Payer: COMMERCIAL

## 2021-02-09 VITALS
DIASTOLIC BLOOD PRESSURE: 84 MMHG | RESPIRATION RATE: 18 BRPM | SYSTOLIC BLOOD PRESSURE: 134 MMHG | HEART RATE: 74 BPM | OXYGEN SATURATION: 95 % | BODY MASS INDEX: 27.74 KG/M2 | TEMPERATURE: 97.6 F | HEIGHT: 66 IN | WEIGHT: 172.6 LBS

## 2021-02-09 DIAGNOSIS — K46.9 NON-RECURRENT ABDOMINAL HERNIA WITHOUT OBSTRUCTION OR GANGRENE, UNSPECIFIED HERNIA TYPE: Primary | ICD-10-CM

## 2021-02-09 PROBLEM — K43.2 INCISIONAL HERNIA OF ANTERIOR ABDOMINAL WALL WITHOUT OBSTRUCTION OR GANGRENE: Status: RESOLVED | Noted: 2021-02-08 | Resolved: 2021-02-09

## 2021-02-09 PROCEDURE — 3725F SCREEN DEPRESSION PERFORMED: CPT | Performed by: FAMILY MEDICINE

## 2021-02-09 PROCEDURE — 3008F BODY MASS INDEX DOCD: CPT | Performed by: FAMILY MEDICINE

## 2021-02-09 PROCEDURE — 1036F TOBACCO NON-USER: CPT | Performed by: FAMILY MEDICINE

## 2021-02-09 PROCEDURE — 99213 OFFICE O/P EST LOW 20 MIN: CPT | Performed by: FAMILY MEDICINE

## 2021-02-09 NOTE — LETTER
February 9, 2021     Patient: London Tabor   YOB: 1960   Date of Visit: 2/9/2021       To Whom it May Concern:    Fatuma Guerrero is under my professional care  She was seen in my office on 2/9/2021  Patient should avoid any lifting activities until further evaluated by General Surgery  If you have any questions or concerns, please don't hesitate to call           Sincerely,          Curtis Spatz, DO        CC: No Recipients

## 2021-02-12 DIAGNOSIS — D3A.012 CARCINOID TUMOR OF ILEUM, UNSPECIFIED WHETHER MALIGNANT: Primary | ICD-10-CM

## 2021-02-15 ENCOUNTER — HOSPITAL ENCOUNTER (OUTPATIENT)
Dept: MAMMOGRAPHY | Facility: CLINIC | Age: 61
Discharge: HOME | End: 2021-02-15
Payer: COMMERCIAL

## 2021-02-15 ENCOUNTER — TELEPHONE (OUTPATIENT)
Dept: MAMMOGRAPHY | Facility: CLINIC | Age: 61
End: 2021-02-15

## 2021-02-15 ENCOUNTER — HOSPITAL ENCOUNTER (OUTPATIENT)
Dept: ULTRASOUND IMAGING | Facility: CLINIC | Age: 61
Discharge: HOME | End: 2021-02-15
Payer: COMMERCIAL

## 2021-02-15 VITALS — BODY MASS INDEX: 27.8 KG/M2 | HEIGHT: 66 IN | WEIGHT: 173 LBS

## 2021-02-15 DIAGNOSIS — R92.8 ABNORMAL MAMMOGRAM: ICD-10-CM

## 2021-02-15 DIAGNOSIS — Z12.39 SCREENING FOR BREAST CANCER: ICD-10-CM

## 2021-02-15 DIAGNOSIS — R92.8 ABNORMAL MAMMOGRAM: Primary | ICD-10-CM

## 2021-02-15 PROCEDURE — 77066 DX MAMMO INCL CAD BI: CPT

## 2021-02-15 PROCEDURE — G0279 TOMOSYNTHESIS, MAMMO: HCPCS

## 2021-02-15 PROCEDURE — 76642 ULTRASOUND BREAST LIMITED: CPT

## 2021-02-15 NOTE — TELEPHONE ENCOUNTER
Epic email sent to Dr Boone Marker Dr Eros Moore,    The above patient had a bilateral breast US and the radiologist has some concerns and is recommending the patient be sent for a bilateral breast MRI   Our radiologist did discuss this with the patient   If you could please place an order for this testing for the patient and then contact the patient and let her know it was ordered it would be appreciated   She can then call central scheduling at [phone] to schedule her MRI  If I can be of any further assistance please let me know       Thank you,   Shine Batista, MSN, RN, CN-BN   Breast Nurse Navigator

## 2021-02-16 ENCOUNTER — HOSPITAL ENCOUNTER (OUTPATIENT)
Dept: INFUSION CENTER | Facility: CLINIC | Age: 61
Discharge: HOME | End: 2021-02-16
Payer: COMMERCIAL

## 2021-02-16 VITALS — TEMPERATURE: 96.2 F

## 2021-02-16 DIAGNOSIS — D3A.012 CARCINOID TUMOR OF ILEUM, UNSPECIFIED WHETHER MALIGNANT: Primary | ICD-10-CM

## 2021-02-16 PROCEDURE — 96372 THER/PROPH/DIAG INJ SC/IM: CPT

## 2021-02-16 RX ADMIN — OCTREOTIDE ACETATE 30 MG: KIT at 15:35

## 2021-02-16 NOTE — PROGRESS NOTES
Pt arrived for sandostatin injection  Pt offered no complaints  Pt was given injection into the R buttocks without incident  Pt declined AVS and was discharged in stable condition

## 2021-03-01 ENCOUNTER — TRANSCRIBE ORDERS (OUTPATIENT)
Dept: ADMINISTRATIVE | Facility: HOSPITAL | Age: 61
End: 2021-03-01

## 2021-03-01 DIAGNOSIS — K43.9 VENTRAL HERNIA WITHOUT OBSTRUCTION OR GANGRENE: Primary | ICD-10-CM

## 2021-03-03 ENCOUNTER — HOSPITAL ENCOUNTER (OUTPATIENT)
Dept: MRI IMAGING | Facility: HOSPITAL | Age: 61
Discharge: HOME | End: 2021-03-03
Payer: COMMERCIAL

## 2021-03-03 VITALS — WEIGHT: 169 LBS | HEIGHT: 66 IN | BODY MASS INDEX: 27.16 KG/M2

## 2021-03-03 DIAGNOSIS — R92.8 ABNORMAL MAMMOGRAM: ICD-10-CM

## 2021-03-03 PROCEDURE — G1004 CDSM NDSC: HCPCS

## 2021-03-03 PROCEDURE — C8937 CAD BREAST MRI: HCPCS

## 2021-03-03 PROCEDURE — A9585 GADOBUTROL INJECTION: HCPCS | Performed by: FAMILY MEDICINE

## 2021-03-03 PROCEDURE — C8908 MRI W/O FOL W/CONT, BREAST,: HCPCS

## 2021-03-03 RX ADMIN — GADOBUTROL 7 ML: 604.72 INJECTION INTRAVENOUS at 13:02

## 2021-03-10 DIAGNOSIS — R92.8 ABNORMAL MAMMOGRAM: Primary | ICD-10-CM

## 2021-03-11 ENCOUNTER — TELEPHONE (OUTPATIENT)
Dept: FAMILY MEDICINE CLINIC | Facility: CLINIC | Age: 61
End: 2021-03-11

## 2021-03-11 NOTE — PROGRESS NOTES
Call placed to patient regarding recommendation for R breast US with possible;    __X___ RIGHT ______LEFT      __X___Ultrasound guided  ______Stereotactic breast biopsy  Procedure explained to patient, additional questions answered at this time    __X___Verbalized understanding        Blood thinners:  _____yes __X___no    Date stopped: ___N/A____    Biopsy teaching sheet given:  _____yes __X__no (All teaching points discussed during call, pt with no questions at this time, pt adv to arrive for [address] followed by 0830 biopsy)    Pt given name/# for any further questions/needs

## 2021-03-11 NOTE — TELEPHONE ENCOUNTER
Patient called our office awaiting to hear back from you,states she had an ultrasound done in February, is this neccessary to repeat again  Patient doesn't feel it is necessary to repeat done on 2/15/21

## 2021-03-11 NOTE — TELEPHONE ENCOUNTER
Breast center RN is supposed to be calling her  [facility]   Can you see if you can get a phone # to give the patient for [facility]

## 2021-03-12 ENCOUNTER — HOSPITAL ENCOUNTER (OUTPATIENT)
Dept: ULTRASOUND IMAGING | Facility: HOSPITAL | Age: 61
Discharge: HOME | End: 2021-03-12
Payer: OTHER MISCELLANEOUS

## 2021-03-12 DIAGNOSIS — K43.9 VENTRAL HERNIA WITHOUT OBSTRUCTION OR GANGRENE: ICD-10-CM

## 2021-03-12 PROCEDURE — 76705 ECHO EXAM OF ABDOMEN: CPT

## 2021-03-16 ENCOUNTER — HOSPITAL ENCOUNTER (OUTPATIENT)
Dept: INFUSION CENTER | Facility: CLINIC | Age: 61
Discharge: HOME | End: 2021-03-16
Payer: COMMERCIAL

## 2021-03-16 VITALS — TEMPERATURE: 97.4 F

## 2021-03-16 DIAGNOSIS — D3A.012 CARCINOID TUMOR OF ILEUM, UNSPECIFIED WHETHER MALIGNANT: Primary | ICD-10-CM

## 2021-03-16 PROCEDURE — 96372 THER/PROPH/DIAG INJ SC/IM: CPT

## 2021-03-16 RX ADMIN — OCTREOTIDE ACETATE 30 MG: KIT at 15:58

## 2021-03-16 NOTE — PROGRESS NOTES
Pt presents for Sandostatin injection  Pt offers no complaints  Injection placed in L gluteus, tolerated well  AVS declined  Next appointment reviewed

## 2021-03-19 ENCOUNTER — HOSPITAL ENCOUNTER (OUTPATIENT)
Dept: ULTRASOUND IMAGING | Facility: CLINIC | Age: 61
Discharge: HOME | End: 2021-03-19
Payer: COMMERCIAL

## 2021-03-19 ENCOUNTER — HOSPITAL ENCOUNTER (OUTPATIENT)
Dept: MAMMOGRAPHY | Facility: CLINIC | Age: 61
Discharge: HOME | End: 2021-03-19
Payer: COMMERCIAL

## 2021-03-19 ENCOUNTER — HOSPITAL ENCOUNTER (OUTPATIENT)
Dept: MAMMOGRAPHY | Facility: CLINIC | Age: 61
Discharge: HOME | End: 2021-03-19

## 2021-03-19 VITALS — HEART RATE: 62 BPM | DIASTOLIC BLOOD PRESSURE: 83 MMHG | SYSTOLIC BLOOD PRESSURE: 128 MMHG

## 2021-03-19 DIAGNOSIS — R92.8 ABNORMAL ULTRASOUND OF BREAST: ICD-10-CM

## 2021-03-19 DIAGNOSIS — R92.8 ABNORMAL MAMMOGRAM: ICD-10-CM

## 2021-03-19 PROCEDURE — 88342 IMHCHEM/IMCYTCHM 1ST ANTB: CPT | Performed by: PATHOLOGY

## 2021-03-19 PROCEDURE — 88341 IMHCHEM/IMCYTCHM EA ADD ANTB: CPT | Performed by: PATHOLOGY

## 2021-03-19 PROCEDURE — 19083 BX BREAST 1ST LESION US IMAG: CPT

## 2021-03-19 PROCEDURE — 88305 TISSUE EXAM BY PATHOLOGIST: CPT | Performed by: PATHOLOGY

## 2021-03-19 PROCEDURE — A4648 IMPLANTABLE TISSUE MARKER: HCPCS

## 2021-03-19 PROCEDURE — 76642 ULTRASOUND BREAST LIMITED: CPT

## 2021-03-19 RX ORDER — LIDOCAINE HYDROCHLORIDE 10 MG/ML
5 INJECTION, SOLUTION EPIDURAL; INFILTRATION; INTRACAUDAL; PERINEURAL ONCE
Status: COMPLETED | OUTPATIENT
Start: 2021-03-19 | End: 2021-03-19

## 2021-03-19 RX ADMIN — LIDOCAINE HYDROCHLORIDE 5 ML: 10 INJECTION, SOLUTION EPIDURAL; INFILTRATION; INTRACAUDAL; PERINEURAL at 08:45

## 2021-03-19 NOTE — DISCHARGE INSTR - OTHER ORDERS
POST LARGE CORE BREAST BIOPSY PATIENT INFORMATION      1  Place an ice pack inside your bra over the top of the dressing every hour for 20 minutes (20 minutes on, 60 minutes off)  Do this until bedtime  2  Do not shower or bathe until the following morning  3  You may bathe your breast carefully with the steri-strips in place  Be careful    Not to loosen them  The steri-strips will fall off in 3-5 days  4  You may have mild discomfort, and you may have some bruising where the   Needle entered the skin  This should clear within 5-7 days  5  If you need medicine for discomfort, take acetaminophen products such as   Tylenol  You may also take Advil or Motrin products  6  Do not participate in strenuous activities such as-tennis, aerobics, skiing,  Weight lifting, etc  for 24 hours  Refrain from swimming/soaking for 72 hours  7  Wearing a bra for sleeping may be more comfortable for the first 24-48 hours  8  Watch for continued bleeding, pain or fever over 101; please call with any questions or concerns  For procedures done at the [facility] call:  Magda Mcdonough [facility] RN at [phone]                    *After 4 PM call the Interventional Radiology Department                    [phone] and ask to speak with the nurse on call  For procedures done at the [address] [place] [address] call:         Isaura Carlton RN at   *After 4 PM call the Interventional Radiology Department   [phone] and ask to speak with the nurse on call  For procedures done at [address] [place] [address] call: The Radiology Nurse at [phone]  *After 4 PM call your physician, or go to the Emergency Department  9          The final results of your biopsy are usually available within one week

## 2021-03-19 NOTE — PROGRESS NOTES
Ice pack given:    ___x__yes _____no    Discharge instructions signed by patient:    ___x__yes _____no    Discharge instructions given to patient:    ___x__yes _____no    Discharged via:    __x___amulatory    _____wheelchair    _____stretcher    Stable on discharge:    ___x__yes ____no  Patient would like results over the phone  Ok to leave a message

## 2021-03-22 NOTE — PROGRESS NOTES
Post procedure call completed    Bleeding: _____yes __X___no (pt denies)    Pain: _____yes ___X___no (pt denies, used ice on Friday, took Ibuprofen with relief)    Redness/Swelling: ______yes ___X___no (pt denies)    Band aid removed: __X___yes _____no    Steri-Strips intact: ___X___yes _____no (discussed with patient to remove steri strips on Wednesday if they have not come off on their own)    Pt with no questions at this time, adv will call when results available, adv to call with any questions or concerns, has name/# for contact

## 2021-03-23 ENCOUNTER — TELEPHONE (OUTPATIENT)
Dept: MAMMOGRAPHY | Facility: CLINIC | Age: 61
End: 2021-03-23

## 2021-04-09 ENCOUNTER — HOSPITAL ENCOUNTER (OUTPATIENT)
Dept: CT IMAGING | Facility: HOSPITAL | Age: 61
Discharge: HOME | End: 2021-04-09
Attending: INTERNAL MEDICINE
Payer: COMMERCIAL

## 2021-04-09 DIAGNOSIS — C7B.02 METASTATIC MALIGNANT CARCINOID TUMOR TO LIVER (HCC): ICD-10-CM

## 2021-04-09 PROCEDURE — G1004 CDSM NDSC: HCPCS

## 2021-04-09 PROCEDURE — 74177 CT ABD & PELVIS W/CONTRAST: CPT

## 2021-04-09 RX ADMIN — IOHEXOL 100 ML: 350 INJECTION, SOLUTION INTRAVENOUS at 12:16

## 2021-04-13 ENCOUNTER — APPOINTMENT (OUTPATIENT)
Dept: LAB | Facility: HOSPITAL | Age: 61
End: 2021-04-13
Attending: INTERNAL MEDICINE
Payer: COMMERCIAL

## 2021-04-13 ENCOUNTER — HOSPITAL ENCOUNTER (OUTPATIENT)
Dept: INFUSION CENTER | Facility: CLINIC | Age: 61
Discharge: HOME | End: 2021-04-13

## 2021-04-13 ENCOUNTER — HOSPITAL ENCOUNTER (OUTPATIENT)
Dept: INFUSION CENTER | Facility: CLINIC | Age: 61
Discharge: HOME | End: 2021-04-13
Payer: COMMERCIAL

## 2021-04-13 VITALS — TEMPERATURE: 98 F

## 2021-04-13 DIAGNOSIS — D3A.012 CARCINOID TUMOR OF ILEUM, UNSPECIFIED WHETHER MALIGNANT: Primary | ICD-10-CM

## 2021-04-13 PROCEDURE — 96372 THER/PROPH/DIAG INJ SC/IM: CPT

## 2021-04-13 RX ADMIN — OCTREOTIDE ACETATE 30 MG: KIT at 09:47

## 2021-04-13 NOTE — PROGRESS NOTES
Pt presents for sandostatin injection offering no complaints  Injection administered as ordered in pt's R glute without incident  Next appt scheduled and reviewed  AVS declined  Pt discharged in stable condition

## 2021-04-15 ENCOUNTER — OFFICE VISIT (OUTPATIENT)
Dept: SURGICAL ONCOLOGY | Facility: CLINIC | Age: 61
End: 2021-04-15
Payer: OTHER MISCELLANEOUS

## 2021-04-15 VITALS
WEIGHT: 174 LBS | HEIGHT: 66 IN | TEMPERATURE: 97.8 F | SYSTOLIC BLOOD PRESSURE: 120 MMHG | RESPIRATION RATE: 14 BRPM | DIASTOLIC BLOOD PRESSURE: 80 MMHG | BODY MASS INDEX: 27.97 KG/M2 | HEART RATE: 60 BPM

## 2021-04-15 DIAGNOSIS — D3A.012 CARCINOID TUMOR OF ILEUM, UNSPECIFIED WHETHER MALIGNANT: ICD-10-CM

## 2021-04-15 DIAGNOSIS — C7B.02 METASTATIC MALIGNANT CARCINOID TUMOR TO LIVER (HCC): Primary | ICD-10-CM

## 2021-04-15 PROCEDURE — 99215 OFFICE O/P EST HI 40 MIN: CPT | Performed by: SURGERY

## 2021-04-15 NOTE — LETTER
April 15, 2021     [place], [address]  [address] [facility]  [address]    Patient: Sylvie Mercedes   YOB: 1960   Date of Visit: 4/15/2021       Dear Dr General Rodriguez: Thank you for referring Cindy Uriostegui to me for evaluation  Below are my notes for this consultation  If you have questions, please do not hesitate to call me  I look forward to following your patient along with you  Sincerely,        Reese Parks MD        CC: Aurelio Bella MD PhD  DO Reese Mcnulty MD  4/15/2021 11:18 AM  Sign when Signing Visit               Surgical Oncology Follow Up       [address] [place]  1960  [id number]  [facility]  [facility]  [address]  [facility] [address]    Diagnoses and all orders for this visit:    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 [facility])  -     MRI abdomen w wo contrast; Future    Carcinoid tumor of ileum, unspecified whether malignant        Chief Complaint   Patient presents with    Follow-up     Hernia        Return in about 2 weeks (around 4/29/2021) for Office Visit, Imaging - See orders  Oncology History   Carcinoid tumor of ileum   6/7/2019 Initial Diagnosis    Carcinoid tumor of ileum     8/22/2019 Surgery    Right hemicolectomy, cholecystectomy  - 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)  - Lymphovascular and perineural invasion are present  T3N1M1  G1  3/24 LN         Staging:  Metastatic neuroendocrine tumor of the ileum, [id number]   August 2019  Treatment history:  Right hemicolectomy, intraoperative ultrasound of the liver and microwave ablation of four liver lesions (medial right lobe, segment 7, segment 6/7, segment 6)    Current treatment:  Octreotide   Disease status: ANYIA    History of Present Illness:   Patient returns earlier than her regular visit because she has an incisional hernia  She has seen 2 separate surgeons who were sure to be cleared  Her chromogranin level is still pending  Her CT from April 9, 2021 reveals a mixed response to her lesions  There is a lesion that appears new at the junction of segment 4A and 8  I personally reviewed the films  She has no significant flushing, diarrhea, palpitations, headaches, or sweats  Review of Systems  Complete ROS Surg Onc:   Complete ROS Surg Onc:   Constitutional: The patient denies new or recent history of general fatigue, no recent weight loss, no change in appetite  Eyes: No complaints of visual problems, no scleral icterus  ENT: no complaints of ear pain, no hoarseness, no difficulty swallowing,  no tinnitus and no new masses in head, oral cavity, or neck  Cardiovascular: No complaints of chest pain, no palpitations, no ankle edema  Respiratory: No complaints of shortness of breath, no cough  Gastrointestinal: No complaints of jaundice, no bloody stools, no pale stools  Genitourinary: No complaints of dysuria, no hematuria, no nocturia, no frequent urination, no urethral discharge  Musculoskeletal: No complaints of weakness, paralysis, joint stiffness or arthralgias  Integumentary: No complaints of rash, no new lesions  Neurological: No complaints of convulsions, no seizures, no dizziness  Hematologic/Lymphatic: No complaints of easy bruising  Endocrine:  No hot or cold intolerance  No polydipsia, polyphagia, or polyuria  Allergy/immunology:  No environmental allergies  No food allergies  Not immunocompromised  Skin:  No pallor or rash  No wound          Patient Active Problem List   Diagnosis    Chronic bilateral low back pain    Depression    Scoliosis of thoracic spine    Carcinoid tumor of ileum    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 ([facility] [address] )    Other long term (current) drug therapy    Non-recurrent abdominal hernia without obstruction or gangrene     Past Medical History:   Diagnosis Date    Cancer [facility]) 2019    neuroendocrine tumor    Psychiatric disorder     Scoliosis     Strep throat     Weight gain      Past Surgical History:   Procedure Laterality Date    BREAST BIOPSY Right 2016    benign    BREAST CYST EXCISION Right     in 30's-benign    CHOLECYSTECTOMY N/A 8/22/2019    Procedure: CHOLECYSTECTOMY;  Surgeon: Alejandro Martínez MD;  [facility];  Service: Surgical Oncology    COLECTOMY TOTAL Right 8/22/2019    Procedure: HEMICOLECTOMY;  Surgeon: Alejandro Martínez MD;  [facility];  Service: Surgical Oncology    HYSTERECTOMY  2010    LAPAROTOMY N/A 8/22/2019    Procedure: LAPAROTOMY EXPLORATORY;  Surgeon: Alejandro Martínez MD;  [facility];  Service: Surgical Oncology    PARTIAL HYSTERECTOMY      OH ABLTJ 1/> LVR BRYNN PRQ RF N/A 8/22/2019    Procedure: ABLATION MICROWAVE; INTRAOPERATIVE ULTRASOUND OF THE LIVER;  Surgeon: Alejandro Martínez MD;  [facility];  Service: Surgical Oncology    TUBAL LIGATION      US GUIDED BREAST BIOPSY RIGHT COMPLETE Right 3/19/2021     Family History   Problem Relation Age of Onset    Thyroid cancer Mother 76    Multiple myeloma Father 68    No Known Problems Maternal Grandmother     No Known Problems Paternal Grandmother     No Known Problems Maternal Aunt     No Known Problems Paternal Aunt     No Known Problems Paternal Aunt     No Known Problems Paternal Vitaliy Rye     Breast cancer Other         age unknown     Social History     Socioeconomic History    Marital status:      Spouse name: Not on file    Number of children: Not on file    Years of education: Not on file    Highest education level: Not on file   Occupational History    Not on file   Social Needs    Financial resource strain: Not on file    Food insecurity     Worry: Not on file     Inability: Not on file    Transportation needs     Medical: Not on file     Non-medical: Not on file   Tobacco Use    Smoking status: Never Smoker    Smokeless tobacco: Never Used   Substance and Sexual Activity    Alcohol use: Not Currently     Frequency: Never     Binge frequency: Never    Drug use: Not Currently    Sexual activity: Yes   Lifestyle    Physical activity     Days per week: Not on file     Minutes per session: Not on file    Stress: Not on file   Relationships    Social connections     Talks on phone: Not on file     Gets together: Not on file     Attends Sikhism service: Not on file     Active member of club or organization: Not on file     Attends meetings of clubs or organizations: Not on file     Relationship status: Not on file    Intimate partner violence     Fear of current or ex partner: Not on file     Emotionally abused: Not on file     Physically abused: Not on file     Forced sexual activity: Not on file   Other Topics Concern    Not on file   Social History Narrative      2 sons    2 Grandkids       Current Outpatient Medications:     Brexpiprazole (REXULTI) 0 5 MG tablet, Take 0 5 mg by mouth daily at bedtime , Disp: , Rfl:     citalopram (CeleXA) 20 mg tablet, Take 1 tablet (20 mg total) by mouth daily (Patient taking differently: Take 20 mg by mouth daily at bedtime ), Disp: 30 tablet, Rfl: 2    octreotide (SandoSTATIN LAR DEPOT) 10 mg IM injection kit, Inject 30 mg into a muscle every 28 days , Disp: , Rfl:   Allergies   Allergen Reactions    Prednisone Other (See Comments) and Confusion     Psychosis      Vitals:    04/15/21 1044   BP: 120/80   Pulse: 60   Resp: 14   Temp: 97 8 °F (36 6 °C)       Physical Exam  Constitutional: General appearance: The Patient is well-developed and well-nourished who appears the stated age in no acute distress  Patient is pleasant and talkative  HEENT:  Normocephalic  Sclerae are anicteric  Mucous membranes are moist  Neck is supple without adenopathy  No JVD  Chest: The lungs are clear to auscultation  Cardiac: Heart is regular rate  Abdomen: Abdomen is soft, non-tender, non-distended and without masses  There is an incisional hernia  Extremities: There is no clubbing or cyanosis  There is no edema  Symmetric  Neuro: Grossly nonfocal  Gait is normal      Lymphatic: No evidence of cervical adenopathy bilaterally  No evidence of axillary adenopathy bilaterally  No evidence of inguinal adenopathy bilaterally  Skin: Warm, anicteric  Psych:  Patient is pleasant and talkative  Breasts:        Pathology:  [unfilled]    Labs:      Imaging  Us Guided Breast Biopsy Right Complete, Mammo Post Biopsy Right    Addendum Date: 3/23/2021 Addendum:   ADDENDUM: PATHOLOGY RESULTS: A  Breast, Right, 8:00 Periareolar region, Biopsy: - Proliferative fibrocystic changes, without atypia, including usual ductal hyperplasia, apocrine metaplasia and stromal fibrosis  - Intraductal microcalcifications are present  - No evidence of malignancy  Imaging and benign pathology are concordant  There was architectural distortion on the diagnostic mammogram from February 2021 with no corresponding finding on MRI  Given the lack of associated MRI finding, this is low risk to be malignant  Six-month follow-up bilateral mammogram is recommended to evaluate for stability (due in August August 2021)  I personally discussed the pathology results and recommendation for follow-up mammogram with the patient via telephone at 11:30 a m  On 03/23/2021  We also discussed considering screening with annual MRI  She can also discuss possible MRI screening with her primary care providers  RECOMMENDATION:      - Diagnostic Mammogram in 6 months for both breasts  END ADDENDUM    Result Date: 3/19/2021  Narrative: DIAGNOSIS:   abnormal right breast imaging  INDICATION: Mae Veronica is a 62 y o  female presenting for ultrasound biopsy, right breast, 1 site    Mass in the right breast at 8 o'clock, periareolar region corresponds with architectural distortion seen on MRI  Ultrasound-guided core needle biopsy has been recommended  FINDINGS: RIGHT breast biopsy at 8 o'clock, periareolar region: Informed consent was obtained by myself, the performing physician  The right breast was marked  The biopsy site was marked  A time-out was performed  The skin was prepped in the usual fashion  Anesthesia was administered using 5 mL of lidocaine 1%  An introducer was placed  During the procedure, the probe was held so that the 8 o'clock cyst was visible near the left side of the image and the 12 o'clock cyst is sometimes visible on the right side of the image  This cyst at 8 o'clock was rupture during the biopsy  A 12 G spring-loaded biopsy device was used to obtain 3 samples under direct ultrasound guidance  A top hat clip was placed in the biopsy cavity under direct ultrasound guidance  The patient tolerated the procedure well  There were no immediate complications  Post biopsy mammogram:  Right 2D CC and ML views were obtained  The top hat clip appears well placed, in the biopsy site at 8 o'clock, 2 cm from the nipple  This does project slightly superior and anterior to the previous biopsy clip and corresponds with the area of concern seen on MRI  Impression:   Successful ultrasound-guided core needle biopsy of the right breast at 8 o'clock, periareolar region  The biopsy site is marked with a top hat clip which appears well placed  Management recommendations will be made once the pathology results are available  RECOMMENDATION:      - Waiting for pathology for the right breast  Workstation ID: [id number]    Us Breast Right Limited (diagnostic)    Result Date: 3/19/2021  Narrative: DIAGNOSIS: Abnormal mammogram TECHNIQUE: Ultrasound of the right breast(s) was performed   COMPARISONS: Prior breast imaging dated: 03/03/2021, 02/15/2021, 02/15/2021, and 08/17/2018 RELEVANT HISTORY: Family Breast Cancer History: History of breast cancer in Other  Family Medical History: Family medical history includes breast cancer in other  Personal History: Hormone history includes birth control  Surgical history includes breast biopsy, breast excisional biopsy, and hysterectomy  No known relevant medical history  RISK ASSESSMENT: 5 Year Tyrer-Cuzick: 1 39 % 10 Year Tyrer-Cuzick: 3 % Lifetime Tyrer-Cuzick: 7 85 % INDICATION: Gary Pressley is a 62 y o  female presenting for Abnormal mammogram   Patient has a history of carcinoid tumor of the bowel  FINDINGS: Limited right breast ultrasound:  Targeted real-time grayscale ultrasound performed by the technologist   There is a cyst at 12 o'clock, retroareolar region and a cyst at 8 o'clock, retroareolar region  There is a linear hypoechoic band extending from the retroareolar region adjacent to the 8 o'clock cyst   This corresponds with the area of architectural distortion seen on the MRI  This is suspicious  Ultrasound-guided core needle biopsy is recommended  The axilla was also evaluated with ultrasound and no abnormal lymph nodes were seen  Impression:  Right breast finding is suspicious and corresponds with the area of architectural distortion on MRI  Ultrasound-guided core needle biopsy is recommended  I personally discussed this recommendation with the patient  She was able to have the biopsy performed today  ASSESSMENT/BI-RADS CATEGORY: Right: 4 - Suspicious Overall: 4 - Suspicious RECOMMENDATION:      - Ultrasound-guided breast biopsy for the right breast  Workstation ID: [id number]    Ct Abdomen Pelvis W Contrast    Result Date: 4/15/2021  Narrative: CT ABDOMEN AND PELVIS WITH IV CONTRAST INDICATION:   C7B 02: Secondary carcinoid tumors of liver  COMPARISON:  12/16/2020; 6/19/2020; 3/2/2020 TECHNIQUE:  CT examination of the abdomen and pelvis was performed  Axial, sagittal, and coronal 2D reformatted images were created from the source data and submitted for interpretation   Radiation dose length product (DLP) for this visit:  881 mGy-cm   This examination, like all CT scans performed in the [facility], was performed utilizing techniques to minimize radiation dose exposure, including the use of iterative reconstruction and automated exposure control  IV Contrast:  100 mL of iohexol (OMNIPAQUE) Enteric Contrast:  Enteric contrast was not administered  FINDINGS: ABDOMEN LOWER CHEST:  No clinically significant abnormality identified in the visualized lower chest  LIVER/BILIARY TREE:  Circumscribed lesion involving segment 8 measures 2 6 x 1 6 cm without significant change from December  Some surrounding hyperenhancement is seen in the liver parenchyma     This may be slightly smaller than the study of September  Mixed attenuation lesion measures 2 3 x 2 1 cm on image 26 peripherally, previously 1 8 x 1 8 cm  1 8 x 1 7 cm lesion at the inferior pole of the liver image 35 is present without significant change  This may be slightly smaller than the study of September  Small 8 x 9 mm hyperattenuating focus is noted on image 66, series 601 and series 2 image 13 near the dome without change in segment 8  Lesion is noted at the junction of segment 4A and 8 on image 23, series 2 measuring 1 2 cm  This is more difficult to appreciate prior studies and may be new  GALLBLADDER:  No calcified gallstones  No pericholecystic inflammatory change  SPLEEN:  Unremarkable  PANCREAS:  Unremarkable  ADRENAL GLANDS:  Unremarkable  KIDNEYS/URETERS:  Circumscribed hypodensities are noted in the kidneys most likely cysts although one lesion is denser at the inferior pole right kidney on image 45 and is more indeterminate measuring 1 cm similar to the study of December  STOMACH AND BOWEL: No small bowel dilatation is seen  The left colon is underdistended with with possible wall thickening/muscular hypertrophy  Scattered diverticula are noted without inflammatory changes   APPENDIX:  Small appendix may be evident on coronal image 50  ABDOMINOPELVIC CAVITY:  No ascites  No pneumoperitoneum  No lymphadenopathy  VESSELS:  Unremarkable for patient's age  PELVIS REPRODUCTIVE ORGANS: The patient is status post hysterectomy  URINARY BLADDER:  The bladder is not well distended  Mild wall thickening is appreciated  ABDOMINAL WALL/INGUINAL REGIONS:  There is a [place] periumbilical hernia into which colon protrude similar to the prior study  OSSEOUS STRUCTURES:  No acute fracture or destructive osseous lesion  Scoliosis is noted  Impression: Evidence of mixed response in the liver with many stable lesions identified; however, there is one lesion larger laterally at the junction of segment 6 and 7 which is slightly larger and 8 new small lesion measuring 1 2 cm at the junction of segment 4 and 8 is noted  Hyperattenuating 1 cm lower pole right renal lesion is stable  Workstation performed: [id number]     I reviewed the above laboratory and imaging data  Discussion/Summary:  60-year-old female status post right hemicolectomy, and microwave ablation of four right-sided liver lesions for a [id number] neuroendocrine tumor ileum   She is clinically ANIYA at nearly 1-1/2 years,  But her imaging is concerning for a new recurrence  I have recommended obtaining an MRI to better delineate this  Once we have the MRI, we will discuss her case at upper GI working group  In regard to her hernia, I think this can be repaired  I would wait till the disease in her liver is adequately treated  Hopefully she can have this treated, but she should have precautions from an anesthesia standpoint   She is on octreotide  I will see her again after the MRI  She is agreeable to this plan   All her questions were answered

## 2021-04-15 NOTE — PROGRESS NOTES
Surgical Oncology Follow Up       Vinny [address]  [facility]ER [facility] SURGICAL ONCOL[facility] [place]  [address]  Prabhu Ruvalcaba  1960  [phone]  [address]  [facility]  [facility]ER [facility] Prabhu Baxtermonisha  [address]  Prabhu ERVIN [id number]    Diagnoses and all orders for this visit:    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 [facility])  -     MRI abdomen w wo contrast; Future    Carcinoid tumor of ileum, unspecified whether malignant        Chief Complaint   Patient presents with    Follow-up     Hernia        Return in about 2 weeks (around 4/29/2021) for Office Visit, Imaging - See orders  Oncology History   Carcinoid tumor of ileum   6/7/2019 Initial Diagnosis    Carcinoid tumor of ileum     8/22/2019 Surgery    Right hemicolectomy, cholecystectomy  - 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)  - Lymphovascular and perineural invasion are present  T3N1M1  G1  3/24 LN         Staging:  Metastatic neuroendocrine tumor of the ileum, [id number]   August 2019  Treatment history:  Right hemicolectomy, intraoperative ultrasound of the liver and microwave ablation of four liver lesions (medial right lobe, segment 7, segment 6/7, segment 6)  Current treatment:  Octreotide   Disease status: ANIYA    History of Present Illness:   Patient returns earlier than her regular visit because she has an incisional hernia  She has seen 2 separate surgeons who were sure to be cleared  Her chromogranin level is still pending  Her CT from April 9, 2021 reveals a mixed response to her lesions  There is a lesion that appears new at the junction of segment 4A and 8  I personally reviewed the films  She has no significant flushing, diarrhea, palpitations, headaches, or sweats      Review of Systems  Complete ROS Surg Onc:   Complete ROS Surg Onc:   Constitutional: The patient denies new or recent history of general fatigue, no recent weight loss, no change in appetite  Eyes: No complaints of visual problems, no scleral icterus  ENT: no complaints of ear pain, no hoarseness, no difficulty swallowing,  no tinnitus and no new masses in head, oral cavity, or neck  Cardiovascular: No complaints of chest pain, no palpitations, no ankle edema  Respiratory: No complaints of shortness of breath, no cough  Gastrointestinal: No complaints of jaundice, no bloody stools, no pale stools  Genitourinary: No complaints of dysuria, no hematuria, no nocturia, no frequent urination, no urethral discharge  Musculoskeletal: No complaints of weakness, paralysis, joint stiffness or arthralgias  Integumentary: No complaints of rash, no new lesions  Neurological: No complaints of convulsions, no seizures, no dizziness  Hematologic/Lymphatic: No complaints of easy bruising  Endocrine:  No hot or cold intolerance  No polydipsia, polyphagia, or polyuria  Allergy/immunology:  No environmental allergies  No food allergies  Not immunocompromised  Skin:  No pallor or rash  No wound          Patient Active Problem List   Diagnosis    Chronic bilateral low back pain    Depression    Scoliosis of thoracic spine    Carcinoid tumor of ileum    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 ([facility] [address] )    Other long term (current) drug therapy    Non-recurrent abdominal hernia without obstruction or gangrene     Past Medical History:   Diagnosis Date    Cancer ([address] ) 2019    neuroendocrine tumor    Psychiatric disorder     Scoliosis     Strep throat     Weight gain      Past Surgical History:   Procedure Laterality Date    BREAST BIOPSY Right 2016    benign    BREAST CYST EXCISION Right     in 30's-benign    CHOLECYSTECTOMY N/A 8/22/2019    Procedure: CHOLECYSTECTOMY;  Surgeon: Hamilton Mcwilliams MD;  [facility];  Service: Surgical Oncology    COLECTOMY TOTAL Right 8/22/2019    Procedure: HEMICOLECTOMY;  Surgeon: Hamilton Mcwilliams, MD;  [facility];  Service: Surgical Oncology    HYSTERECTOMY  2010    LAPAROTOMY N/A 8/22/2019    Procedure: LAPAROTOMY EXPLORATORY;  Surgeon: Lynsey Hare MD;  [facility];  Service: Surgical Oncology    PARTIAL HYSTERECTOMY      MD ABLTJ 1/> LVR BRYNN PRQ RF N/A 8/22/2019    Procedure: ABLATION MICROWAVE; INTRAOPERATIVE ULTRASOUND OF THE LIVER;  Surgeon: Lynsey Hare MD;  [facility];  Service: Surgical Oncology    TUBAL LIGATION      US GUIDED BREAST BIOPSY RIGHT COMPLETE Right 3/19/2021     Family History   Problem Relation Age of Onset    Thyroid cancer Mother 76    Multiple myeloma Father 68    No Known Problems Maternal Grandmother     No Known Problems Paternal Grandmother     No Known Problems Maternal Aunt     No Known Problems Paternal Aunt     No Known Problems Paternal Aunt     No Known Problems Paternal [de-identified]     Breast cancer Other         age unknown     Social History     Socioeconomic History    Marital status:      Spouse name: Not on file    Number of children: Not on file    Years of education: Not on file    Highest education level: Not on file   Occupational History    Not on file   Social Needs    Financial resource strain: Not on file    Food insecurity     Worry: Not on file     Inability: Not on file   [employer] needs     Medical: Not on file     Non-medical: Not on file   Tobacco Use    Smoking status: Never Smoker    Smokeless tobacco: Never Used   Substance and Sexual Activity    Alcohol use: Not Currently     Frequency: Never     Binge frequency: Never    Drug use: Not Currently    Sexual activity: Yes   Lifestyle    Physical activity     Days per week: Not on file     Minutes per session: Not on file    Stress: Not on file   Relationships    Social connections     Talks on phone: Not on file     Gets together: Not on file     Attends Buddhism service: Not on file     Active member of club or organization: Not on file     Attends meetings of clubs or organizations: Not on file     Relationship status: Not on file    Intimate partner violence     Fear of current or ex partner: Not on file     Emotionally abused: Not on file     Physically abused: Not on file     Forced sexual activity: Not on file   Other Topics Concern    Not on file   Social History Narrative      2 sons    2 Grandkids       Current Outpatient Medications:     Brexpiprazole (REXULTI) 0 5 MG tablet, Take 0 5 mg by mouth daily at bedtime , Disp: , Rfl:     citalopram (CeleXA) 20 mg tablet, Take 1 tablet (20 mg total) by mouth daily (Patient taking differently: Take 20 mg by mouth daily at bedtime ), Disp: 30 tablet, Rfl: 2    octreotide (SandoSTATIN LAR DEPOT) 10 mg IM injection kit, Inject 30 mg into a muscle every 28 days , Disp: , Rfl:   Allergies   Allergen Reactions    Prednisone Other (See Comments) and Confusion     Psychosis      Vitals:    04/15/21 1044   BP: 120/80   Pulse: 60   Resp: 14   Temp: 97 8 °F (36 6 °C)       Physical Exam  Constitutional: General appearance: The Patient is well-developed and well-nourished who appears the stated age in no acute distress  Patient is pleasant and talkative  HEENT:  Normocephalic  Sclerae are anicteric  Mucous membranes are moist  Neck is supple without adenopathy  No JVD  Chest: The lungs are clear to auscultation  Cardiac: Heart is regular rate  Abdomen: Abdomen is soft, non-tender, non-distended and without masses  There is an incisional hernia  Extremities: There is no clubbing or cyanosis  There is no edema  Symmetric  Neuro: Grossly nonfocal  Gait is normal      Lymphatic: No evidence of cervical adenopathy bilaterally  No evidence of axillary adenopathy bilaterally  No evidence of inguinal adenopathy bilaterally  Skin: Warm, anicteric  Psych:  Patient is pleasant and talkative    Breasts:        Pathology:  [unfilled]    Labs:      Imaging  Us Guided Breast Biopsy Right Complete, Mammo Post Biopsy Right    Addendum Date: 3/23/2021 Addendum:   ADDENDUM: PATHOLOGY RESULTS: A  Breast, Right, 8:00 Periareolar region, Biopsy: - Proliferative fibrocystic changes, without atypia, including usual ductal hyperplasia, apocrine metaplasia and stromal fibrosis  - Intraductal microcalcifications are present  - No evidence of malignancy  Imaging and benign pathology are concordant  There was architectural distortion on the diagnostic mammogram from February 2021 with no corresponding finding on MRI  Given the lack of associated MRI finding, this is low risk to be malignant  Six-month follow-up bilateral mammogram is recommended to evaluate for stability (due in August August 2021)  I personally discussed the pathology results and recommendation for follow-up mammogram with the patient via telephone at 11:30 a m  On 03/23/2021  We also discussed considering screening with annual MRI  She can also discuss possible MRI screening with her primary care providers  RECOMMENDATION:      - Diagnostic Mammogram in 6 months for both breasts  END ADDENDUM    Result Date: 3/19/2021  Narrative: DIAGNOSIS:   abnormal right breast imaging  INDICATION: Dilip Mckinnon is a 62 y o  female presenting for ultrasound biopsy, right breast, 1 site  Mass in the right breast at 8 o'clock, periareolar region corresponds with architectural distortion seen on MRI  Ultrasound-guided core needle biopsy has been recommended  FINDINGS: RIGHT breast biopsy at 8 o'clock, periareolar region: Informed consent was obtained by myself, the performing physician  The right breast was marked  The biopsy site was marked  A time-out was performed  The skin was prepped in the usual fashion  Anesthesia was administered using 5 mL of lidocaine 1%  An introducer was placed    During the procedure, the probe was held so that the 8 o'clock cyst was visible near the left side of the image and the 12 o'clock cyst is sometimes visible on the right side of the image  This cyst at 8 o'clock was rupture during the biopsy  A 12 G spring-loaded biopsy device was used to obtain 3 samples under direct ultrasound guidance  A top hat clip was placed in the biopsy cavity under direct ultrasound guidance  The patient tolerated the procedure well  There were no immediate complications  Post biopsy mammogram:  Right 2D CC and ML views were obtained  The top hat clip appears well placed, in the biopsy site at 8 o'clock, 2 cm from the nipple  This does project slightly superior and anterior to the previous biopsy clip and corresponds with the area of concern seen on MRI  Impression:   Successful ultrasound-guided core needle biopsy of the right breast at 8 o'clock, periareolar region  The biopsy site is marked with a top hat clip which appears well placed  Management recommendations will be made once the pathology results are available  RECOMMENDATION:      - Waiting for pathology for the right breast  Workstation ID: [id number]    Us Breast Right Limited (diagnostic)    Result Date: 3/19/2021  Narrative: DIAGNOSIS: Abnormal mammogram TECHNIQUE: Ultrasound of the right breast(s) was performed  COMPARISONS: Prior breast imaging dated: 03/03/2021, 02/15/2021, 02/15/2021, and 08/17/2018 RELEVANT HISTORY: Family Breast Cancer History: History of breast cancer in Other  Family Medical History: Family medical history includes breast cancer in other  Personal History: Hormone history includes birth control  Surgical history includes breast biopsy, breast excisional biopsy, and hysterectomy  No known relevant medical history  RISK ASSESSMENT: 5 Year Tyrer-Cuzick: 1 39 % 10 Year Tyrer-Cuzick: 3 % Lifetime Tyrer-Cuzick: 7 85 % INDICATION: Chema Sheriff is a 62 y o  female presenting for Abnormal mammogram   Patient has a history of carcinoid tumor of the bowel   FINDINGS: Limited right breast ultrasound:  Targeted real-time grayscale ultrasound performed by the technologist   There is a cyst at 12 o'clock, retroareolar region and a cyst at 8 o'clock, retroareolar region  There is a linear hypoechoic band extending from the retroareolar region adjacent to the 8 o'clock cyst   This corresponds with the area of architectural distortion seen on the MRI  This is suspicious  Ultrasound-guided core needle biopsy is recommended  The axilla was also evaluated with ultrasound and no abnormal lymph nodes were seen  Impression:  Right breast finding is suspicious and corresponds with the area of architectural distortion on MRI  Ultrasound-guided core needle biopsy is recommended  I personally discussed this recommendation with the patient  She was able to have the biopsy performed today  ASSESSMENT/BI-RADS CATEGORY: Right: 4 - Suspicious Overall: 4 - Suspicious RECOMMENDATION:      - Ultrasound-guided breast biopsy for the right breast  Workstation ID: [id number]    Ct Abdomen Pelvis W Contrast    Result Date: 4/15/2021  Narrative: CT ABDOMEN AND PELVIS WITH IV CONTRAST INDICATION:   C7B 02: Secondary carcinoid tumors of liver  COMPARISON:  12/16/2020; 6/19/2020; 3/2/2020 TECHNIQUE:  CT examination of the abdomen and pelvis was performed  Axial, sagittal, and coronal 2D reformatted images were created from the source data and submitted for interpretation  Radiation dose length product (DLP) for this visit:  881 mGy-cm   This examination, like all CT scans performed in the [facility], was performed utilizing techniques to minimize radiation dose exposure, including the use of iterative reconstruction and automated exposure control  IV Contrast:  100 mL of iohexol (OMNIPAQUE) Enteric Contrast:  Enteric contrast was not administered   FINDINGS: ABDOMEN LOWER CHEST:  No clinically significant abnormality identified in the visualized lower chest  LIVER/BILIARY TREE:  Circumscribed lesion involving segment 8 measures 2 6 x 1 6 cm without significant change from December  Some surrounding hyperenhancement is seen in the liver parenchyma     This may be slightly smaller than the study of September  Mixed attenuation lesion measures 2 3 x 2 1 cm on image 26 peripherally, previously 1 8 x 1 8 cm  1 8 x 1 7 cm lesion at the inferior pole of the liver image 35 is present without significant change  This may be slightly smaller than the study of September  Small 8 x 9 mm hyperattenuating focus is noted on image 66, series 601 and series 2 image 13 near the dome without change in segment 8  Lesion is noted at the junction of segment 4A and 8 on image 23, series 2 measuring 1 2 cm  This is more difficult to appreciate prior studies and may be new  GALLBLADDER:  No calcified gallstones  No pericholecystic inflammatory change  SPLEEN:  Unremarkable  PANCREAS:  Unremarkable  ADRENAL GLANDS:  Unremarkable  KIDNEYS/URETERS:  Circumscribed hypodensities are noted in the kidneys most likely cysts although one lesion is denser at the inferior pole right kidney on image 45 and is more indeterminate measuring 1 cm similar to the study of December  STOMACH AND BOWEL: No small bowel dilatation is seen  The left colon is underdistended with with possible wall thickening/muscular hypertrophy  Scattered diverticula are noted without inflammatory changes  APPENDIX:  Small appendix may be evident on coronal image 50  ABDOMINOPELVIC CAVITY:  No ascites  No pneumoperitoneum  No lymphadenopathy  VESSELS:  Unremarkable for patient's age  PELVIS REPRODUCTIVE ORGANS: The patient is status post hysterectomy  URINARY BLADDER:  The bladder is not well distended  Mild wall thickening is appreciated  ABDOMINAL WALL/INGUINAL REGIONS:  There is a [place] periumbilical hernia into which colon protrude similar to the prior study  OSSEOUS STRUCTURES:  No acute fracture or destructive osseous lesion  Scoliosis is noted       Impression: Evidence of mixed response in the liver with many stable lesions identified; however, there is one lesion larger laterally at the junction of segment 6 and 7 which is slightly larger and 8 new small lesion measuring 1 2 cm at the junction of segment 4 and 8 is noted  Hyperattenuating 1 cm lower pole right renal lesion is stable  Workstation performed: [id number]     I reviewed the above laboratory and imaging data  Discussion/Summary:  52-year-old female status post right hemicolectomy, and microwave ablation of four right-sided liver lesions for a [id number] neuroendocrine tumor ileum   She is clinically ANIYA at nearly 1-1/2 years,  But her imaging is concerning for a new recurrence  I have recommended obtaining an MRI to better delineate this  Once we have the MRI, we will discuss her case at upper GI working group  In regard to her hernia, I think this can be repaired  I would wait till the disease in her liver is adequately treated  Hopefully she can have this treated, but she should have precautions from an anesthesia standpoint   She is on octreotide  I will see her again after the MRI  She is agreeable to this plan   All her questions were answered

## 2021-04-26 ENCOUNTER — OFFICE VISIT (OUTPATIENT)
Dept: HEMATOLOGY ONCOLOGY | Facility: CLINIC | Age: 61
End: 2021-04-26
Payer: COMMERCIAL

## 2021-04-26 VITALS
HEART RATE: 68 BPM | OXYGEN SATURATION: 95 % | RESPIRATION RATE: 18 BRPM | WEIGHT: 175 LBS | DIASTOLIC BLOOD PRESSURE: 86 MMHG | HEIGHT: 66 IN | BODY MASS INDEX: 28.12 KG/M2 | SYSTOLIC BLOOD PRESSURE: 138 MMHG

## 2021-04-26 DIAGNOSIS — C7B.02 METASTATIC MALIGNANT CARCINOID TUMOR TO LIVER (HCC): Primary | ICD-10-CM

## 2021-04-26 PROCEDURE — 1036F TOBACCO NON-USER: CPT | Performed by: INTERNAL MEDICINE

## 2021-04-26 PROCEDURE — 3008F BODY MASS INDEX DOCD: CPT | Performed by: INTERNAL MEDICINE

## 2021-04-26 PROCEDURE — 99214 OFFICE O/P EST MOD 30 MIN: CPT | Performed by: INTERNAL MEDICINE

## 2021-04-26 NOTE — PROGRESS NOTES
HEMATOLOGY / ONCOLOGY CLINIC NOTE    Primary Care Provider: Gladys Dhaliwal MD  Referring Provider:    MRN: [id number]  : 1960    Reason for Encounter:    Chief Complaint   Patient presents with    Follow-up         History of Hematology / Oncology Illness:     Chema Sheriff is a 62 y o  female who came in  to establish care with oncology    1, GI carcinoid tumor, T3N1M1  G1  - presented with diarrhea ongoing for 2 years  Colonoscopy showed polyps, biopsy showed GI carcinoid tumor     - Ki 67 less than 3 percent  Low to intermediate grade    -  PET-CT showed intra-abdominal lymphadenopathy and liver leison  Cancer marker all normal before surgery    - 2019 : s/p Right hemicolectomy, cholecystectomy, 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); Found Lymphovascular and perineural invasion are present;   T3N1M1  G1;  3/24 LN  -  :  Intraoperative  liver lesion ablation  - 2021 : CT C/a/P Showed questionable liver lesion  MRI to be done      Current treatment:  Sandostatin, every month started in 10/2019 , plan for 2 years             Assessment / Plan:         1  Carcinoid tumor of ileum  - MRI  Liver to be done  To evaluate liver lesions  Patient initially presented with diarrhea, as for now her bowel movement habits remains the same   -   Will continue Sandostatin for now  If MRI showed diffuse liver recurrence will consider PRRT  -   If MRI showed no major findings will continue current treatment repeat scan in 3 months   -   Patient has abdominal hernia, from Oncology standpoint there is no concern to proceed with surgical correction  - CT chest abdomen pelvis w contrast; Future          2  Carcinoid tumor of ileum, unspecified whether malignant     - CT abdomen pelvis w contrast; Future         3   Diarrhea  -  intermittent, tolerable            Made patient aware regarding side effects of chemotherapy, including but not limited to fatigue cytopenia, increased risk for infection, potential kidney, liver injuries neuropathies et al    Made patient aware to call MD or go to ED for any fever,  Chills, bleeding, easy bruise, unhealed wound, chest pain, abdominal pain et al            25    minutes were spent face to face with patient with greater than 50% of the time spent in counseling or coordination of care including discussions of treatment instructions  All of the patient's questions were answered to their satisfactory during this discussion  Advised pt to call if there is any further questions  Interval History:      7/2/2019 :  Patient is a 60-year-old female, with history of depression, chronic diarrhea, recently diagnosed GI neuroendocrine tumor  [facility]  Patient reported other than diarrhea, she has no flushing, no dyspnea, no abdominal pain  She has no other malignancies in the past, her diarrhea is about 3 to 4 times a day watery usually after meal   She has been evaluated by GI, her diarrhea is considered due to combination of IBS and carcinoid tumor  Patient is a nonsmoker, drinks alcohol socially  9/13/2019 :  Patient came in for follow-up  Reported after surgery has recovered very well, bowel movement back to normal   No fatigue, no sweating no flushing  12/19/2019 :  Came for follow-up  Reported doing okay, has some very mild intermittent right upper quadrant abdominal pain however normal skin color no nausea vomiting no diarrhea  Tolerating injection, no other issues  6/25/2020 :  Came for follow-up, overall doing well  Patient with recently diagnosed COVID with her  as well  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  Recovered well, never need hospitalization  Has some intermittent diarrhea, nothing concerning    No skin color change no abdominal pain distension    9/28/2020 :  Patient came in for follow-up doing well, has some mild diarrhea, otherwise no major issues  Body weight stable, no flushing  12/21/2020 :  Patient came in for follow-up  Subjectively doing okay  Mild diarrhea however not very severe and not like the initial symptoms when cancer was diagnosed  Body weight stable; no flushing    4/26/2021 : Patient came in for follow-up, subjective patient doing well  No abdominal pain blood shin  Bowel movement habits remains the same  Problem list:       Patient Active Problem List   Diagnosis    Chronic bilateral low back pain    Depression    Scoliosis of thoracic spine    Carcinoid tumor of ileum    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 ([facility] [address] )    Other long term (current) drug therapy    Non-recurrent abdominal hernia without obstruction or gangrene         PHYSICIAL EXAMINATION:     Vital Signs:   [unfilled]  Body mass index is 28 25 kg/m²  Body surface area is 1 89 meters squared  Appears comfortable no major finding, normal skin color no major difference from previous visit        GEN: Alert, awake oriented x3, in no acute distress  HEENT- No pallor, icterus, cyanosis, no oral mucosal lesions,   LAD - no palpable cervical, clavicle, axillary, inguinal LAD  Heart- normal S1 S2, regular rate and rhythm, No murmur, rubs  Lungs- decreased breathing sound bilateral    Abdomen- soft, Non tender, bowel sounds present  Extremities- No cyanosis, clubbing, edema  Neuro- No focal neurological deficit           PAST MEDICAL HISTORY:   has a past medical history of Cancer ([facility] [address] ) (2019), Psychiatric disorder, Scoliosis, Strep throat, and Weight gain  PAST SURGICAL HISTORY:   has a past surgical history that includes Partial hysterectomy; Tubal ligation; pr abltj 1/> lvr bandar prq rf (N/A, 8/22/2019); COLECTOMY TOTAL (Right, 8/22/2019); Cholecystectomy (N/A, 8/22/2019); LAPAROTOMY (N/A, 8/22/2019); Hysterectomy (2010);  Breast cyst excision (Right); Breast biopsy (Right, 2016); and US guided breast biopsy right complete (Right, 3/19/2021)  CURRENT MEDICATIONS:     Current Outpatient Medications   Medication Sig Dispense Refill    Brexpiprazole (REXULTI) 0 5 MG tablet Take 0 5 mg by mouth daily at bedtime       citalopram (CeleXA) 20 mg tablet Take 1 tablet (20 mg total) by mouth daily (Patient taking differently: Take 20 mg by mouth daily at bedtime ) 30 tablet 2    octreotide (SandoSTATIN LAR DEPOT) 10 mg IM injection kit Inject 30 mg into a muscle every 28 days        No current facility-administered medications for this visit  [unfilled]    SOCIAL HISTORY:   reports that she has never smoked  She has never used smokeless tobacco  She reports previous alcohol use  She reports previous drug use  FAMILY HISTORY:  family history includes Breast cancer in her other; Multiple myeloma (age of onset: 68) in her father; No Known Problems in her maternal aunt, maternal grandmother, paternal aunt, paternal aunt, paternal aunt, and paternal grandmother; Thyroid cancer (age of onset: 76) in her mother  ALLERGIES:  is allergic to prednisone  REVIEW OF SYSTEMS:  Please note that a 14-point review of systems was performed to include Constitutional, HEENT, Respiratory, CVS, GI, , Musculoskeletal, Integumentary, Neurologic, Rheumatologic, Endocrinologic, Psychiatric, Lymphatic, and Hematologic/Oncologic systems were reviewed and are negative unless otherwise stated in HPI  Positive and negative findings pertinent to this evaluation are incorporated into the history of present illness            Lab Results   Component Value Date    SODIUM 140 04/13/2021    K 4 1 04/13/2021     04/13/2021    CO2 27 04/13/2021    AGAP 9 04/13/2021    BUN 16 04/13/2021    CREATININE 0 80 04/13/2021    GLUC 110 12/14/2020    GLUF 139 (H) 04/13/2021    CALCIUM 9 4 04/13/2021    AST 47 (H) 04/13/2021     (H) 04/13/2021    ALKPHOS 79 04/13/2021    TP 7 7 04/13/2021    TBILI 0 43 04/13/2021    EGFR 81 04/13/2021       CBC with diff:         Invalid input(s):  RBC, TOTALCELLSCOUNTED, SEGS%, GRANS%, LYMPHS%, EOS%, BASO%, ABNEUT, ABGRANS, ABLYMPHS, ABMOMOS, ABEOS, ABBASO    CMP:        Invalid input(s): ALBUMIN    IMAGING:    CT chest abdomen pelvis w contrast    (Results Pending)     Ct Small Bowel Enterography    Result Date: 6/20/2019  Narrative: CT ABDOMEN AND PELVIS WITH IV CONTRAST- ENTEROGRAPHY - WITH CONTRAST INDICATION:   D3A 012: Benign carcinoid tumor of the ileum  COMPARISON:  None  TECHNIQUE:  Contrast-enhanced CT examination of the abdomen and pelvis was performed utilizing thin section technique and after the administration of low density enteric contrast according to protocol designed specifically to obtain sensitive evaluation of the small bowel  Axial, sagittal, and coronal 2D reformatted images were created from the source data and submitted for interpretation  Radiation dose length product (DLP) for this visit:  374 7 mGy-cm   This examination, like all CT scans performed in the [facility], was performed utilizing techniques to minimize radiation dose exposure, including the use of iterative reconstruction and automated exposure control  IV Contrast:  100 mL of iohexol (OMNIPAQUE) Enteric Contrast:  1500 cc of VoLumen enteric contrast was administered  FINDINGS: ABDOMEN SMALL BOWEL: There is an enhancing nodule in the terminal ileum, adjacent to the ileocecal valve, measuring 1 9 cm  Presumably this corresponds with the known neoplasm  There is no small bowel wall thickening or obstruction  LARGE BOWEL:  Normal caliber  No focal wall thickening or pericolonic inflammatory change  There is sigmoid diverticulosis without diverticulitis  STOMACH:  Unremarkable  LOWER CHEST:  No clinically significant abnormality identified in the visualized lower chest  LIVER/BILIARY TREE:  Ill-defined enhancing lesion in the right hepatic lobe posterior segment measuring approximately 2 3 x 1 6 cm on image 2/42  Additional 1 2 cm enhancing focus at the lateral margin of the right hepatic lobe at the same level, on image 2/37  Additional enhancing lesions in the posterior segment more inferiorly, measuring 1 2 x 1 4 cm on image 2/59, and 1 8 x 1 7 cm on image 2/79  GALLBLADDER:  No calcified gallstones  No pericholecystic inflammatory change  SPLEEN:  Unremarkable  PANCREAS:  Unremarkable  ADRENAL GLANDS:  Unremarkable  KIDNEYS/URETERS:  Indeterminate 1 0 cm hypodense nodule at the lower pole of the right kidney posteriorly on image 2/106  No hydronephrosis or calculi  ABDOMINOPELVIC CAVITY: Enhancing nodule with possible necrotic component in the right lower quadrant mesentery, measuring 2 6 x 1 9 cm in short axis, suspicious for metastatic adenopathy  No ascites  No pneumoperitoneum  VESSELS:  Unremarkable for patient's age  PELVIS REPRODUCTIVE ORGANS:  Patient is status post hysterectomy  URINARY BLADDER:  Unremarkable  ABDOMINAL WALL/INGUINAL REGIONS:  Unremarkable  OSSEOUS STRUCTURES:  No acute fracture or destructive osseous lesion  Impression: 1 9 cm nodule in the terminal ileum, presumably corresponding with known neoplasm  There is a 2 6 cm right lower quadrant mesenteric nodule which is suspicious for metastatic disease  Multiple enhancing lesions in the right hepatic lobe, concerning for metastatic disease  Suggest hepatic protocol MRI for further evaluation  Indeterminate 1 0 cm hypodense right renal nodule  Suggest ultrasound for further evaluation  The study was marked in EPIC for significant notification  Workstation performed: [id number]     Nm Pet Ct Skull Base To Mid Thigh    Result Date: 6/24/2019  Narrative: PET/CT SCAN INDICATION: Suspected carcinoid tumor of the terminal ileum  Initial staging    D3A 012: Benign carcinoid tumor of the ileum MODIFIER: PI COMPARISON: CT small bowel enterography 6/18/2019 CELL TYPE:  preliminary pathology of minute group of epithelial cells with neuroendocrine differentiation of uncertain significance (bx terminal ileum polyp 6/7/19) TECHNIQUE:   8 6 mCi F-18-FDG administered IV  Multiplanar attenuation corrected and non attenuation corrected PET images are available for interpretation, and contiguous, low dose, axial CT sections were obtained from the skull base through the femurs   Intravenous contrast material was not utilized  This examination, like all CT scans performed in the [facility], was performed utilizing techniques to minimize radiation dose exposure, including the use of iterative reconstruction and automated exposure control  Fasting serum glucose: 95 mg/dl FINDINGS: VISUALIZED BRAIN:   No acute abnormalities are seen  HEAD/NECK:   There is a physiologic distribution of FDG  Fairly symmetric FDG uptake in Waldeyer's ring is likely physiologic  No FDG avid cervical adenopathy is seen  CT images: Unremarkable  CHEST:   No FDG avid soft tissue lesions are seen  CT images: Unremarkable  ABDOMEN/PELVIS:  Mild focal FDG uptake at the terminal ileum, SUV max of 3 1 where there is a known lesion on prior CT  The activity is similar to normal bowel activity  Scattered vague hypodense lesions in the liver, not FDG avid above liver background, SUV max of 3 1  These are better seen on the prior contrast CT where they were hyperdense and enhancing  Enlarged right mesenteric lymph node again noted, with mild FDG uptake, SUV max of 3 0  This measures 2 6 x 2 0 cm image 179 series 3  Linear FDG uptake at the right hip may be physiologic or related to tendinitis  CT images: Otherwise unremarkable  OSSEOUS STRUCTURES: No FDG avid lesions are seen  CT images: S-shaped scoliosis noted of the thoracolumbar spine  Scattered multilevel degenerative changes of the spine  Impression: 1  Mild FDG uptake in the region of the known terminal ileal lesion and in the right mesenteric lymph node  Findings favor low-grade neoplasm   2  The scattered hepatic lesions do not demonstrate significant FDG uptake above liver background   3   Given the pathology findings of suspected carcinoid disease, gallium-68 Dotatate scan may be a more sensitive test  Workstation performed: [id number]

## 2021-05-03 ENCOUNTER — HOSPITAL ENCOUNTER (OUTPATIENT)
Dept: MRI IMAGING | Facility: HOSPITAL | Age: 61
Discharge: HOME | End: 2021-05-03
Attending: SURGERY
Payer: COMMERCIAL

## 2021-05-03 DIAGNOSIS — C7B.02 METASTATIC MALIGNANT CARCINOID TUMOR TO LIVER (HCC): ICD-10-CM

## 2021-05-03 PROCEDURE — 74183 MRI ABD W/O CNTR FLWD CNTR: CPT

## 2021-05-03 PROCEDURE — A9585 GADOBUTROL INJECTION: HCPCS | Performed by: SURGERY

## 2021-05-03 PROCEDURE — G1004 CDSM NDSC: HCPCS

## 2021-05-03 RX ADMIN — GADOBUTROL 7 ML: 604.72 INJECTION INTRAVENOUS at 08:54

## 2021-05-06 ENCOUNTER — OFFICE VISIT (OUTPATIENT)
Dept: SURGICAL ONCOLOGY | Facility: CLINIC | Age: 61
End: 2021-05-06
Payer: COMMERCIAL

## 2021-05-06 VITALS
HEART RATE: 63 BPM | TEMPERATURE: 97.8 F | RESPIRATION RATE: 17 BRPM | BODY MASS INDEX: 28.12 KG/M2 | DIASTOLIC BLOOD PRESSURE: 82 MMHG | SYSTOLIC BLOOD PRESSURE: 120 MMHG | WEIGHT: 175 LBS | HEIGHT: 66 IN

## 2021-05-06 DIAGNOSIS — C7B.02 METASTATIC MALIGNANT CARCINOID TUMOR TO LIVER (HCC): Primary | ICD-10-CM

## 2021-05-06 PROCEDURE — 1036F TOBACCO NON-USER: CPT | Performed by: SURGERY

## 2021-05-06 PROCEDURE — 99214 OFFICE O/P EST MOD 30 MIN: CPT | Performed by: SURGERY

## 2021-05-06 PROCEDURE — 3008F BODY MASS INDEX DOCD: CPT | Performed by: SURGERY

## 2021-05-06 NOTE — LETTER
May 6, 2021     Jennifer Nunez MD  [address]  [address]    Patient: Vania Hancock   YOB: 1960   Date of Visit: 5/6/2021       Dear Dr Jennifer Dumont: Thank you for referring Nurys Clark to me for evaluation  Below are my notes for this consultation  If you have questions, please do not hesitate to call me  I look forward to following your patient along with you  Sincerely,        Cristofer Pace MD        CC: Sherie López MD PhD  DO Cristofer Del Castillo MD  5/6/2021 12:50 PM  Incomplete               Surgical Oncology Follow Up       [address] [facility] SURGICAL ONCOLOGY [facility]  [address] [facility] [address]  [facility] [place]  1960  [id number]  [facility]  [facility] SURGICAL ONCOLOGY [facility]  [address]  [facility] [address]    Diagnoses and all orders for this visit:    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 [facility])  -     Chromogranin A; Future        Chief Complaint   Patient presents with    Follow-up       Return in about 6 months (around 11/6/2021) for Office Visit, Labs - See Treatment Plan  Oncology History   Carcinoid tumor of ileum   6/7/2019 Initial Diagnosis    Carcinoid tumor of ileum     8/22/2019 Surgery    Right hemicolectomy, cholecystectomy  - 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)  - Lymphovascular and perineural invasion are present  T3N1M1  G1  3/24 LN         Staging:  Metastatic neuroendocrine tumor of the ileum, [id number]   August 2019  Treatment history:  Right hemicolectomy, intraoperative ultrasound of the liver and microwave ablation of four liver lesions (medial right lobe, segment 7, segment 6/7, segment 6)  Current treatment:  Octreotide   Disease status: ANIYA    History of Present Illness:  Patient returns in follow-up of her MRI  She is feeling well  No flushing, palpitations, headaches, or sweats  She still has some occasional diarrhea that she relates to foods that she eats  MRI from May 3rd revealed a stable enhancing lesion in segment 8 that measured 2 5 by VI cm  Enhancing lesion in segment 7 measuring 1 8 x 1 5 cm that was stable in size  A 1 8 x 1 6 cm segment 6 lesion that was stable  A 9 mm segment 8 lesion that was stable and a 4 mm lesion in the dome of the liver that was not seen on CT  I personally reviewed the films  She comes in now to discuss further therapy  Review of Systems  Complete ROS Surg Onc:   Complete ROS Surg Onc:   Constitutional: The patient denies new or recent history of general fatigue, no recent weight loss, no change in appetite  Eyes: No complaints of visual problems, no scleral icterus  ENT: no complaints of ear pain, no hoarseness, no difficulty swallowing,  no tinnitus and no new masses in head, oral cavity, or neck  Cardiovascular: No complaints of chest pain, no palpitations, no ankle edema  Respiratory: No complaints of shortness of breath, no cough  Gastrointestinal: No complaints of jaundice, no bloody stools, no pale stools  Genitourinary: No complaints of dysuria, no hematuria, no nocturia, no frequent urination, no urethral discharge  Musculoskeletal: No complaints of weakness, paralysis, joint stiffness or arthralgias  Integumentary: No complaints of rash, no new lesions  Neurological: No complaints of convulsions, no seizures, no dizziness  Hematologic/Lymphatic: No complaints of easy bruising  Endocrine:  No hot or cold intolerance  No polydipsia, polyphagia, or polyuria  Allergy/immunology:  No environmental allergies  No food allergies  Not immunocompromised  Skin:  No pallor or rash  No wound          Patient Active Problem List   Diagnosis    Chronic bilateral low back pain    Depression    Scoliosis of thoracic spine    Carcinoid tumor of ileum    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 ([facility] [address] )    Other long term (current) drug therapy    Non-recurrent abdominal hernia without obstruction or gangrene     Past Medical History:   Diagnosis Date    Cancer [facility]) 2019    neuroendocrine tumor    Psychiatric disorder     Scoliosis     Strep throat     Weight gain      Past Surgical History:   Procedure Laterality Date    BREAST BIOPSY Right 2016    benign    BREAST CYST EXCISION Right     in 30's-benign    CHOLECYSTECTOMY N/A 8/22/2019    Procedure: CHOLECYSTECTOMY;  Surgeon: Joselyn Kirby MD;  [facility];  Service: Surgical Oncology    COLECTOMY TOTAL Right 8/22/2019    Procedure: HEMICOLECTOMY;  Surgeon: Joselyn Kirby MD;  [facility];  Service: Surgical Oncology    HYSTERECTOMY  2010    LAPAROTOMY N/A 8/22/2019    Procedure: LAPAROTOMY EXPLORATORY;  Surgeon: Joselyn Kirby MD;  [facility];  Service: Surgical Oncology    PARTIAL HYSTERECTOMY      MS ABLTJ 1/> LVR BRYNN PRQ RF N/A 8/22/2019    Procedure: ABLATION MICROWAVE; INTRAOPERATIVE ULTRASOUND OF THE LIVER;  Surgeon: Joselyn Kirby MD;  [facility];  Service: Surgical Oncology    TUBAL LIGATION      US GUIDED BREAST BIOPSY RIGHT COMPLETE Right 3/19/2021     Family History   Problem Relation Age of Onset    Thyroid cancer Mother 76    Multiple myeloma Father 68    No Known Problems Maternal Grandmother     No Known Problems Paternal Grandmother     No Known Problems Maternal Aunt     No Known Problems Paternal Aunt     No Known Problems Paternal Aunt     No Known Problems Paternal Aunt     Breast cancer Other         age unknown     Social History     Socioeconomic History    Marital status:      Spouse name: Not on file    Number of children: Not on file    Years of education: Not on file    Highest education level: Not on file   Occupational History    Not on file   Social Needs    Financial resource strain: Not on file    Food insecurity     Worry: Not on file     Inability: Not on file   Mavis Cousin Transportation needs     Medical: Not on file     Non-medical: Not on file   Tobacco Use    Smoking status: Never Smoker    Smokeless tobacco: Never Used   Substance and Sexual Activity    Alcohol use: Not Currently     Frequency: Never     Binge frequency: Never    Drug use: Not Currently    Sexual activity: Yes   Lifestyle    Physical activity     Days per week: Not on file     Minutes per session: Not on file    Stress: Not on file   Relationships    Social connections     Talks on phone: Not on file     Gets together: Not on file     Attends Orthodoxy service: Not on file     Active member of club or organization: Not on file     Attends meetings of clubs or organizations: Not on file     Relationship status: Not on file    Intimate partner violence     Fear of current or ex partner: Not on file     Emotionally abused: Not on file     Physically abused: Not on file     Forced sexual activity: Not on file   Other Topics Concern    Not on file   Social History Narrative      2 sons    2 Grandkids       Current Outpatient Medications:     Brexpiprazole (REXULTI) 0 5 MG tablet, Take 0 5 mg by mouth daily at bedtime , Disp: , Rfl:     citalopram (CeleXA) 20 mg tablet, Take 1 tablet (20 mg total) by mouth daily (Patient taking differently: Take 20 mg by mouth daily at bedtime ), Disp: 30 tablet, Rfl: 2    octreotide (SandoSTATIN LAR DEPOT) 10 mg IM injection kit, Inject 30 mg into a muscle every 28 days , Disp: , Rfl:   Allergies   Allergen Reactions    Prednisone Other (See Comments) and Confusion     Psychosis      Vitals:    05/06/21 1230   BP: 120/82   Pulse: 63   Resp: 17   Temp: 97 8 °F (36 6 °C)       Physical Exam  Constitutional: General appearance: The Patient is well-developed and well-nourished who appears the stated age in no acute distress  Patient is pleasant and talkative  HEENT:  Normocephalic  Sclerae are anicteric   Mucous membranes are moist  Neck is supple without adenopathy  No JVD  Chest: The lungs are clear to auscultation  Cardiac: Heart is regular rate  Abdomen: Abdomen is soft, non-tender, non-distended and without masses  Extremities: There is no clubbing or cyanosis  There is no edema  Symmetric  Neuro: Grossly nonfocal  Gait is normal      Lymphatic: No evidence of cervical adenopathy bilaterally  No evidence of axillary adenopathy bilaterally  Skin: Warm, anicteric  Psych:  Patient is pleasant and talkative  Breasts:        Pathology:  [unfilled]    Labs:   Ref Range & Units 4/13/21 10:36 AM   Chromogranin A 0 0 - 101 8 ng/mL 14 5          Imaging  Mri Abdomen W Wo Contrast    Result Date: 5/5/2021  Narrative: MRI - ABDOMEN - WITH AND WITHOUT CONTRAST INDICATION:  Metastatic neuroendocrine tumor  COMPARISON: CT abdomen/pelvis dated 4/9/2021  TECHNIQUE:  The following pulse sequences were obtained prior to and following the administration of intravenous contrast:     Coronal and axial T2 with TE of 90 and 180 respectively, axial T2 with fat saturation, axial FIESTA fat-sat, axial T1-weighted in-and-out-of phase, axial DWI/ADC, precontrast axial T1 with fat saturation, post-contrast dynamic axial T1 with fat saturation at 20, 70, and 180 seconds, coronal T1  with fat saturation and 7 minute delayed axial T1 with fat saturation  IV Contrast:  7 mL of Gadobutrol injection (SINGLE-DOSE) FINDINGS: LOWER CHEST:   Unremarkable  LIVER: The liver is enlarged measuring up to 19 8 cm  There is diffuse loss of signal throughout the liver on opposed phase imaging consistent with fatty infiltration   Again demonstrated are multiple liver metastases as follows: -2 5 x 1 6 cm enhancing segment 8 lesion (image 200, series 12), stable -1 8 x 1 5 cm enhancing segment 7 lesion (image 211, series 12), not significantly changed after remeasurement  -1 8 x 1 6 cm segment 6 lesion (image 92, series 12), stable -9 mm enhancing peripheral segment 8 lesion (image 29, series 12), stable -4 mm enhancing focus near the dome of the liver (image 40, series 12), likely too small to be seen on CT The hepatic veins and portal veins are patent  BILE DUCTS: No intrahepatic or extrahepatic bile duct dilation  GALLBLADDER:  Surgically absent  PANCREAS: No pancreatic mass  No main pancreatic ductal dilation  ADRENAL GLANDS:  Unremarkable  SPLEEN:  Unremarkable  KIDNEYS/PROXIMAL URETERS: No hydroureteronephrosis  No suspicious renal mass  Small bilateral renal cysts measuring up to 9 mm on the left  BOWEL:   No dilated loops of bowel  PERITONEUM/RETROPERITONEUM: No ascites  LYMPH NODES: No abdominal lymphadenopathy  VASCULAR STRUCTURES:  No aneurysm  ABDOMINAL WALL:  Redemonstrated umbilical hernia containing nonobstructed bowel  OSSEOUS STRUCTURES:  No suspicious osseous lesion  Thoracolumbar levoscoliosis  Impression: Multiple overall stable liver metastases with the largest measuring up to 2 5 cm in segment 8  Hepatomegaly/hepatic steatosis  Redemonstrated umbilical hernia containing nonobstructed bowel  Workstation performed: [id number]     Ct Abdomen Pelvis W Contrast    Result Date: 4/15/2021  Narrative: CT ABDOMEN AND PELVIS WITH IV CONTRAST INDICATION:   C7B 02: Secondary carcinoid tumors of liver  COMPARISON:  12/16/2020; 6/19/2020; 3/2/2020 TECHNIQUE:  CT examination of the abdomen and pelvis was performed  Axial, sagittal, and coronal 2D reformatted images were created from the source data and submitted for interpretation  Radiation dose length product (DLP) for this visit:  881 mGy-cm   This examination, like all CT scans performed in the [facility], was performed utilizing techniques to minimize radiation dose exposure, including the use of iterative reconstruction and automated exposure control  IV Contrast:  100 mL of iohexol (OMNIPAQUE) Enteric Contrast:  Enteric contrast was not administered   FINDINGS: ABDOMEN LOWER CHEST:  No clinically significant abnormality identified in the visualized lower chest  LIVER/BILIARY TREE:  Circumscribed lesion involving segment 8 measures 2 6 x 1 6 cm without significant change from December  Some surrounding hyperenhancement is seen in the liver parenchyma     This may be slightly smaller than the study of September  Mixed attenuation lesion measures 2 3 x 2 1 cm on image 26 peripherally, previously 1 8 x 1 8 cm  1 8 x 1 7 cm lesion at the inferior pole of the liver image 35 is present without significant change  This may be slightly smaller than the study of September  Small 8 x 9 mm hyperattenuating focus is noted on image 66, series 601 and series 2 image 13 near the dome without change in segment 8  Lesion is noted at the junction of segment 4A and 8 on image 23, series 2 measuring 1 2 cm  This is more difficult to appreciate prior studies and may be new  GALLBLADDER:  No calcified gallstones  No pericholecystic inflammatory change  SPLEEN:  Unremarkable  PANCREAS:  Unremarkable  ADRENAL GLANDS:  Unremarkable  KIDNEYS/URETERS:  Circumscribed hypodensities are noted in the kidneys most likely cysts although one lesion is denser at the inferior pole right kidney on image 45 and is more indeterminate measuring 1 cm similar to the study of December  STOMACH AND BOWEL: No small bowel dilatation is seen  The left colon is underdistended with with possible wall thickening/muscular hypertrophy  Scattered diverticula are noted without inflammatory changes  APPENDIX:  Small appendix may be evident on coronal image 50  ABDOMINOPELVIC CAVITY:  No ascites  No pneumoperitoneum  No lymphadenopathy  VESSELS:  Unremarkable for patient's age  PELVIS REPRODUCTIVE ORGANS: The patient is status post hysterectomy  URINARY BLADDER:  The bladder is not well distended  Mild wall thickening is appreciated  ABDOMINAL WALL/INGUINAL REGIONS:  There is a [place] periumbilical hernia into which colon protrude similar to the prior study  OSSEOUS STRUCTURES:  No acute fracture or destructive osseous lesion  Scoliosis is noted  Impression: Evidence of mixed response in the liver with many stable lesions identified; however, there is one lesion larger laterally at the junction of segment 6 and 7 which is slightly larger and 8 new small lesion measuring 1 2 cm at the junction of segment 4 and 8 is noted  Hyperattenuating 1 cm lower pole right renal lesion is stable  Workstation performed: [id number]     I reviewed the above laboratory and imaging data  Discussion/Summary: 41-year-old female status post right hemicolectomy, and microwave ablation of four right-sided liver lesions for a [id number] neuroendocrine tumor ileum   She is clinically ANIYA at 1-1/2 years, but her imaging is concerning for a new recurrence  These are stable in size from the CT  These are small  Her chromogranin level is normal   I have recommended continued observation and taking octreotide  I will hold off on any liver directed therapy or PRRT at this time  We will discuss her case at upper GI working group and I will call her with those results  In regard to her hernia, I think this can be repaired  Hopefully she can have this treated, but she should have precautions from an anesthesia standpoint  In terms of follow-up imaging, she does have significant bills from her previous imaging  Consequently we have just elected to repeat her chromogranin level in 6 months  If this would become markedly elevated I would consider imaging again at that time  She is agreeable to this plan   All her questions were answered

## 2021-05-06 NOTE — PROGRESS NOTES
Surgical Oncology Follow Up       Vinny  42  [facility]  CANCER [facility] SURGICAL ONCOLOGY [place]  [address]  Melani Ruvalcaba  1960  [id number]  [facility]  [facility] SURGICAL ONCOLOGY [place]  [address]  [place] ERVIN [id number]    Diagnoses and all orders for this visit:    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 [facility])  -     Chromogranin A; Future        Chief Complaint   Patient presents with    Follow-up       Return in about 6 months (around 11/6/2021) for Office Visit, Labs - See Treatment Plan  Oncology History   Carcinoid tumor of ileum   6/7/2019 Initial Diagnosis    Carcinoid tumor of ileum     8/22/2019 Surgery    Right hemicolectomy, cholecystectomy  - 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)  - Lymphovascular and perineural invasion are present  T3N1M1  G1  3/24 LN         Staging:  Metastatic neuroendocrine tumor of the ileum, [id number]   August 2019  Treatment history:  Right hemicolectomy, intraoperative ultrasound of the liver and microwave ablation of four liver lesions (medial right lobe, segment 7, segment 6/7, segment 6)  Current treatment:  Octreotide   Disease status: ANIYA    History of Present Illness:  Patient returns in follow-up of her MRI  She is feeling well  No flushing, palpitations, headaches, or sweats  She still has some occasional diarrhea that she relates to foods that she eats  MRI from May 3rd revealed a stable enhancing lesion in segment 8 that measured 2 5 by VI cm  Enhancing lesion in segment 7 measuring 1 8 x 1 5 cm that was stable in size  A 1 8 x 1 6 cm segment 6 lesion that was stable  A 9 mm segment 8 lesion that was stable and a 4 mm lesion in the dome of the liver that was not seen on CT  I personally reviewed the films  She comes in now to discuss further therapy      Review of Systems  Complete ROS Surg Onc:   Complete ROS Surg Onc:   Constitutional: The patient denies new or recent history of general fatigue, no recent weight loss, no change in appetite  Eyes: No complaints of visual problems, no scleral icterus  ENT: no complaints of ear pain, no hoarseness, no difficulty swallowing,  no tinnitus and no new masses in head, oral cavity, or neck  Cardiovascular: No complaints of chest pain, no palpitations, no ankle edema  Respiratory: No complaints of shortness of breath, no cough  Gastrointestinal: No complaints of jaundice, no bloody stools, no pale stools  Genitourinary: No complaints of dysuria, no hematuria, no nocturia, no frequent urination, no urethral discharge  Musculoskeletal: No complaints of weakness, paralysis, joint stiffness or arthralgias  Integumentary: No complaints of rash, no new lesions  Neurological: No complaints of convulsions, no seizures, no dizziness  Hematologic/Lymphatic: No complaints of easy bruising  Endocrine:  No hot or cold intolerance  No polydipsia, polyphagia, or polyuria  Allergy/immunology:  No environmental allergies  No food allergies  Not immunocompromised  Skin:  No pallor or rash  No wound          Patient Active Problem List   Diagnosis    Chronic bilateral low back pain    Depression    Scoliosis of thoracic spine    Carcinoid tumor of ileum    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 ([facility] [address] )    Other long term (current) drug therapy    Non-recurrent abdominal hernia without obstruction or gangrene     Past Medical History:   Diagnosis Date    Cancer ([address] ) 2019    neuroendocrine tumor    Psychiatric disorder     Scoliosis     Strep throat     Weight gain      Past Surgical History:   Procedure Laterality Date    BREAST BIOPSY Right 2016    benign    BREAST CYST EXCISION Right     in 30's-benign    CHOLECYSTECTOMY N/A 8/22/2019    Procedure: CHOLECYSTECTOMY;  Surgeon: Dorothea Moses MD;  [facility];  Service: Surgical Oncology    COLECTOMY TOTAL Right 8/22/2019    Procedure: HEMICOLECTOMY;  Surgeon: Kalina Jackson MD;  [facility];  Service: Surgical Oncology    HYSTERECTOMY  2010    LAPAROTOMY N/A 8/22/2019    Procedure: LAPAROTOMY EXPLORATORY;  Surgeon: Kalina Jackson MD;  [facility];  Service: Surgical Oncology    PARTIAL HYSTERECTOMY      WY ABLTJ 1/> LVR BRYNN PRQ RF N/A 8/22/2019    Procedure: ABLATION MICROWAVE; INTRAOPERATIVE ULTRASOUND OF THE LIVER;  Surgeon: Kalina Jackson MD;  [facility];  Service: Surgical Oncology    TUBAL LIGATION      US GUIDED BREAST BIOPSY RIGHT COMPLETE Right 3/19/2021     Family History   Problem Relation Age of Onset    Thyroid cancer Mother 76    Multiple myeloma Father 68    No Known Problems Maternal Grandmother     No Known Problems Paternal Grandmother     No Known Problems Maternal Aunt     No Known Problems Paternal Aunt     No Known Problems Paternal Aunt     No Known Problems Paternal [de-identified]     Breast cancer Other         age unknown     Social History     Socioeconomic History    Marital status:      Spouse name: Not on file    Number of children: Not on file    Years of education: Not on file    Highest education level: Not on file   Occupational History    Not on file   Social Needs    Financial resource strain: Not on file    Food insecurity     Worry: Not on file     Inability: Not on file   [employer] needs     Medical: Not on file     Non-medical: Not on file   Tobacco Use    Smoking status: Never Smoker    Smokeless tobacco: Never Used   Substance and Sexual Activity    Alcohol use: Not Currently     Frequency: Never     Binge frequency: Never    Drug use: Not Currently    Sexual activity: Yes   Lifestyle    Physical activity     Days per week: Not on file     Minutes per session: Not on file    Stress: Not on file   Relationships    Social connections     Talks on phone: Not on file     Gets together: Not on file Attends Denominational service: Not on file     Active member of club or organization: Not on file     Attends meetings of clubs or organizations: Not on file     Relationship status: Not on file    Intimate partner violence     Fear of current or ex partner: Not on file     Emotionally abused: Not on file     Physically abused: Not on file     Forced sexual activity: Not on file   Other Topics Concern    Not on file   Social History Narrative      2 sons    2 Grandkids       Current Outpatient Medications:     Brexpiprazole (REXULTI) 0 5 MG tablet, Take 0 5 mg by mouth daily at bedtime , Disp: , Rfl:     citalopram (CeleXA) 20 mg tablet, Take 1 tablet (20 mg total) by mouth daily (Patient taking differently: Take 20 mg by mouth daily at bedtime ), Disp: 30 tablet, Rfl: 2    octreotide (SandoSTATIN LAR DEPOT) 10 mg IM injection kit, Inject 30 mg into a muscle every 28 days , Disp: , Rfl:   Allergies   Allergen Reactions    Prednisone Other (See Comments) and Confusion     Psychosis      Vitals:    05/06/21 1230   BP: 120/82   Pulse: 63   Resp: 17   Temp: 97 8 °F (36 6 °C)       Physical Exam  Constitutional: General appearance: The Patient is well-developed and well-nourished who appears the stated age in no acute distress  Patient is pleasant and talkative  HEENT:  Normocephalic  Sclerae are anicteric  Mucous membranes are moist  Neck is supple without adenopathy  No JVD  Chest: The lungs are clear to auscultation  Cardiac: Heart is regular rate  Abdomen: Abdomen is soft, non-tender, non-distended and without masses  Extremities: There is no clubbing or cyanosis  There is no edema  Symmetric  Neuro: Grossly nonfocal  Gait is normal      Lymphatic: No evidence of cervical adenopathy bilaterally  No evidence of axillary adenopathy bilaterally  Skin: Warm, anicteric  Psych:  Patient is pleasant and talkative    Breasts:        Pathology:  [unfilled]    Labs:   Ref Range & Units 4/13/21 10:36 AM   Chromogranin A 0 0 - 101 8 ng/mL 14 5          Imaging  Mri Abdomen W Wo Contrast    Result Date: 5/5/2021  Narrative: MRI - ABDOMEN - WITH AND WITHOUT CONTRAST INDICATION:  Metastatic neuroendocrine tumor  COMPARISON: CT abdomen/pelvis dated 4/9/2021  TECHNIQUE:  The following pulse sequences were obtained prior to and following the administration of intravenous contrast:     Coronal and axial T2 with TE of 90 and 180 respectively, axial T2 with fat saturation, axial FIESTA fat-sat, axial T1-weighted in-and-out-of phase, axial DWI/ADC, precontrast axial T1 with fat saturation, post-contrast dynamic axial T1 with fat saturation at 20, 70, and 180 seconds, coronal T1  with fat saturation and 7 minute delayed axial T1 with fat saturation  IV Contrast:  7 mL of Gadobutrol injection (SINGLE-DOSE) FINDINGS: LOWER CHEST:   Unremarkable  LIVER: The liver is enlarged measuring up to 19 8 cm  There is diffuse loss of signal throughout the liver on opposed phase imaging consistent with fatty infiltration  Again demonstrated are multiple liver metastases as follows: -2 5 x 1 6 cm enhancing segment 8 lesion (image 200, series 12), stable -1 8 x 1 5 cm enhancing segment 7 lesion (image 211, series 12), not significantly changed after remeasurement  -1 8 x 1 6 cm segment 6 lesion (image 92, series 12), stable -9 mm enhancing peripheral segment 8 lesion (image 29, series 12), stable -4 mm enhancing focus near the dome of the liver (image 40, series 12), likely too small to be seen on CT The hepatic veins and portal veins are patent  BILE DUCTS: No intrahepatic or extrahepatic bile duct dilation  GALLBLADDER:  Surgically absent  PANCREAS: No pancreatic mass  No main pancreatic ductal dilation  ADRENAL GLANDS:  Unremarkable  SPLEEN:  Unremarkable  KIDNEYS/PROXIMAL URETERS: No hydroureteronephrosis  No suspicious renal mass  Small bilateral renal cysts measuring up to 9 mm on the left   BOWEL:   No dilated loops of bowel  PERITONEUM/RETROPERITONEUM: No ascites  LYMPH NODES: No abdominal lymphadenopathy  VASCULAR STRUCTURES:  No aneurysm  ABDOMINAL WALL:  Redemonstrated umbilical hernia containing nonobstructed bowel  OSSEOUS STRUCTURES:  No suspicious osseous lesion  Thoracolumbar levoscoliosis  Impression: Multiple overall stable liver metastases with the largest measuring up to 2 5 cm in segment 8  Hepatomegaly/hepatic steatosis  Redemonstrated umbilical hernia containing nonobstructed bowel  Workstation performed: [id number]     Ct Abdomen Pelvis W Contrast    Result Date: 4/15/2021  Narrative: CT ABDOMEN AND PELVIS WITH IV CONTRAST INDICATION:   C7B 02: Secondary carcinoid tumors of liver  COMPARISON:  12/16/2020; 6/19/2020; 3/2/2020 TECHNIQUE:  CT examination of the abdomen and pelvis was performed  Axial, sagittal, and coronal 2D reformatted images were created from the source data and submitted for interpretation  Radiation dose length product (DLP) for this visit:  881 mGy-cm   This examination, like all CT scans performed in the [facility], was performed utilizing techniques to minimize radiation dose exposure, including the use of iterative reconstruction and automated exposure control  IV Contrast:  100 mL of iohexol (OMNIPAQUE) Enteric Contrast:  Enteric contrast was not administered  FINDINGS: ABDOMEN LOWER CHEST:  No clinically significant abnormality identified in the visualized lower chest  LIVER/BILIARY TREE:  Circumscribed lesion involving segment 8 measures 2 6 x 1 6 cm without significant change from December  Some surrounding hyperenhancement is seen in the liver parenchyma     This may be slightly smaller than the study of September  Mixed attenuation lesion measures 2 3 x 2 1 cm on image 26 peripherally, previously 1 8 x 1 8 cm  1 8 x 1 7 cm lesion at the inferior pole of the liver image 35 is present without significant change    This may be slightly smaller than the study of September  Small 8 x 9 mm hyperattenuating focus is noted on image 66, series 601 and series 2 image 13 near the dome without change in segment 8  Lesion is noted at the junction of segment 4A and 8 on image 23, series 2 measuring 1 2 cm  This is more difficult to appreciate prior studies and may be new  GALLBLADDER:  No calcified gallstones  No pericholecystic inflammatory change  SPLEEN:  Unremarkable  PANCREAS:  Unremarkable  ADRENAL GLANDS:  Unremarkable  KIDNEYS/URETERS:  Circumscribed hypodensities are noted in the kidneys most likely cysts although one lesion is denser at the inferior pole right kidney on image 45 and is more indeterminate measuring 1 cm similar to the study of December  STOMACH AND BOWEL: No small bowel dilatation is seen  The left colon is underdistended with with possible wall thickening/muscular hypertrophy  Scattered diverticula are noted without inflammatory changes  APPENDIX:  Small appendix may be evident on coronal image 50  ABDOMINOPELVIC CAVITY:  No ascites  No pneumoperitoneum  No lymphadenopathy  VESSELS:  Unremarkable for patient's age  PELVIS REPRODUCTIVE ORGANS: The patient is status post hysterectomy  URINARY BLADDER:  The bladder is not well distended  Mild wall thickening is appreciated  ABDOMINAL WALL/INGUINAL REGIONS:  There is a [place] periumbilical hernia into which colon protrude similar to the prior study  OSSEOUS STRUCTURES:  No acute fracture or destructive osseous lesion  Scoliosis is noted  Impression: Evidence of mixed response in the liver with many stable lesions identified; however, there is one lesion larger laterally at the junction of segment 6 and 7 which is slightly larger and 8 new small lesion measuring 1 2 cm at the junction of segment 4 and 8 is noted  Hyperattenuating 1 cm lower pole right renal lesion is stable  Workstation performed: [id number]     I reviewed the above laboratory and imaging data      Discussion/Summary: 60-year-old female status post right hemicolectomy, and microwave ablation of four right-sided liver lesions for a [id number] neuroendocrine tumor ileum   She is clinically ANIYA at 1-1/2 years, but her imaging is concerning for a new recurrence  These are stable in size from the CT  These are small  Her chromogranin level is normal   I have recommended continued observation and taking octreotide  I will hold off on any liver directed therapy or PRRT at this time  We will discuss her case at upper GI working group and I will call her with those results  In regard to her hernia, I think this can be repaired  Hopefully she can have this treated, but she should have precautions from an anesthesia standpoint  In terms of follow-up imaging, she does have significant bills from her previous imaging  Consequently we have just elected to repeat her chromogranin level in 6 months  If this would become markedly elevated I would consider imaging again at that time  She is agreeable to this plan   All her questions were answered

## 2021-05-11 ENCOUNTER — HOSPITAL ENCOUNTER (OUTPATIENT)
Dept: INFUSION CENTER | Facility: CLINIC | Age: 61
Discharge: HOME | End: 2021-05-11
Payer: COMMERCIAL

## 2021-05-11 VITALS
DIASTOLIC BLOOD PRESSURE: 68 MMHG | HEART RATE: 75 BPM | RESPIRATION RATE: 18 BRPM | TEMPERATURE: 97.9 F | SYSTOLIC BLOOD PRESSURE: 117 MMHG

## 2021-05-11 DIAGNOSIS — D3A.012 CARCINOID TUMOR OF ILEUM, UNSPECIFIED WHETHER MALIGNANT: Primary | ICD-10-CM

## 2021-05-11 PROCEDURE — 96372 THER/PROPH/DIAG INJ SC/IM: CPT

## 2021-05-11 RX ADMIN — OCTREOTIDE ACETATE 30 MG: KIT at 10:17

## 2021-05-11 NOTE — PROGRESS NOTES
Pt here today for Sandostatin, denies any discomforts  Sandostatin given IM in left ventrogluteal, pt tolerated well    Pt aware of next appointment, declines AVS

## 2021-05-12 ENCOUNTER — CONSULT (OUTPATIENT)
Dept: SURGERY | Facility: CLINIC | Age: 61
End: 2021-05-12
Payer: OTHER MISCELLANEOUS

## 2021-05-12 DIAGNOSIS — C7B.02 METASTATIC MALIGNANT CARCINOID TUMOR TO LIVER (HCC): ICD-10-CM

## 2021-05-12 DIAGNOSIS — K43.2 INCISIONAL HERNIA OF ANTERIOR ABDOMINAL WALL WITHOUT OBSTRUCTION OR GANGRENE: Primary | ICD-10-CM

## 2021-05-12 PROCEDURE — 99244 OFF/OP CNSLTJ NEW/EST MOD 40: CPT | Performed by: SURGERY

## 2021-05-12 NOTE — PROGRESS NOTES
Assessment/Plan:    Diagnoses and all orders for this visit:    Incisional hernia of anterior abdominal wall without obstruction or gangrene      Risks and benefits for incisional hernia repair were discussed with her including potential for bowel injury or recurrence and she agrees to proceed  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 [facility])  -     Ambulatory referral to General Surgery      Her present stage is stable  Will proceed with hernia repair to get her back to her daily living activities  Subjective:      Patient ID: Jahaira Doyle is a 62 y o  female  Presents for evaluation of abdominal hernia  Bulge present since February  Patient states she was moving a patient at work on February 4 when pain began  Achy pain worse with certain movements or positions  CT, MRI and US of abdomen have been completed  Underwent  Right hemicolectomy for terminal ileal carcinoid tumor  She does presently have stable liver metastasis  She works as a CNA  While lifting at work she felt pain and developed this hernia around the umbilicus under her old incision  Occasionally has diarrhea issues attributed to not only her carcinoid but expected after the removal of the ileocecal valve in the past       The following portions of the patient's history were reviewed and updated as appropriate:     She  has a past medical history of Cancer ([address] ) (2019), Psychiatric disorder, Scoliosis, Strep throat, and Weight gain  She  has a past surgical history that includes Partial hysterectomy; Tubal ligation; pr abltj 1/> lvr bandar prq rf (N/A, 8/22/2019); COLECTOMY TOTAL (Right, 8/22/2019); Cholecystectomy (N/A, 8/22/2019); LAPAROTOMY (N/A, 8/22/2019); Hysterectomy (2010); Breast cyst excision (Right); Breast biopsy (Right, 2016); and US guided breast biopsy right complete (Right, 3/19/2021)    Her family history includes Breast cancer in her other; Multiple myeloma (age of onset: 68) in her father; No Known Problems in her maternal aunt, maternal grandmother, paternal aunt, paternal aunt, paternal aunt, and paternal grandmother; Thyroid cancer (age of onset: 76) in her mother  She  reports that she has never smoked  She has never used smokeless tobacco  She reports previous alcohol use  She reports previous drug use  Current Outpatient Medications   Medication Sig Dispense Refill    Brexpiprazole (REXULTI) 0 5 MG tablet Take 0 5 mg by mouth daily at bedtime       citalopram (CeleXA) 20 mg tablet Take 1 tablet (20 mg total) by mouth daily (Patient taking differently: Take 20 mg by mouth daily at bedtime ) 30 tablet 2    octreotide (SandoSTATIN LAR DEPOT) 10 mg IM injection kit Inject 30 mg into a muscle every 28 days        No current facility-administered medications for this visit  She is allergic to prednisone       Review of Systems   Gastrointestinal: Positive for diarrhea  Negative for abdominal pain  All other systems reviewed and are negative  Objective: There were no vitals taken for this visit  Physical Exam  Constitutional:       Appearance: Normal appearance  HENT:      Head: Atraumatic  Mouth/Throat:      Mouth: Mucous membranes are moist    Eyes:      Extraocular Movements: Extraocular movements intact  Neck:      Musculoskeletal: Normal range of motion and neck supple  Cardiovascular:      Rate and Rhythm: Normal rate  Pulmonary:      Effort: Pulmonary effort is normal    Abdominal:      General: Abdomen is flat  Bowel sounds are normal       Palpations: Abdomen is soft  Hernia: A hernia (Incisional hernia through her old scar around the umbilicus ) is present  Musculoskeletal:         General: No swelling  Skin:     General: Skin is warm and dry  Neurological:      Mental Status: She is alert

## 2021-05-13 ENCOUNTER — PREP FOR PROCEDURE (OUTPATIENT)
Dept: SURGERY | Facility: CLINIC | Age: 61
End: 2021-05-13

## 2021-05-13 DIAGNOSIS — K43.2 INCISIONAL HERNIA: Primary | ICD-10-CM

## 2021-05-20 ENCOUNTER — DOCUMENTATION (OUTPATIENT)
Dept: HEMATOLOGY ONCOLOGY | Facility: CLINIC | Age: 61
End: 2021-05-20

## 2021-05-20 NOTE — PROGRESS NOTES
RECTAL/GI MULTIDISCIPLINARY CASE REVIEW    DATE: 5/20/21      PRESENTING DOCTOR: Dr Dunia Mar      DIAGNOSIS: Metastatic NET of Ileum      [place] Session was presented at the Rectal/GI Multidisciplinary Conference today  PHYSICIAN RECOMMENDED PLAN:    -Observation of chromagranin A in 6 months to 1 year  -If symptoms develop, consider proceeding with imaging    -Patient is currently on Octreotide therapy    Team agreed to plan      NCCN guidelines were readily available for review at this discussion

## 2021-06-08 ENCOUNTER — HOSPITAL ENCOUNTER (OUTPATIENT)
Dept: INFUSION CENTER | Facility: CLINIC | Age: 61
Discharge: HOME | End: 2021-06-08
Payer: COMMERCIAL

## 2021-06-08 VITALS — TEMPERATURE: 97.4 F

## 2021-06-08 DIAGNOSIS — D3A.012 CARCINOID TUMOR OF ILEUM, UNSPECIFIED WHETHER MALIGNANT: Primary | ICD-10-CM

## 2021-06-08 PROCEDURE — 96372 THER/PROPH/DIAG INJ SC/IM: CPT

## 2021-06-08 RX ADMIN — OCTREOTIDE ACETATE 30 MG: KIT at 10:38

## 2021-06-08 NOTE — PROGRESS NOTES
Pt here for sandostatin injection, offering no complaints at present time  Tolerated injection in the Right upper glute  AVS declined  Making a new appt on way out  Walked out in stable condition

## 2021-06-15 ENCOUNTER — HOSPITAL ENCOUNTER (OUTPATIENT)
Dept: INFUSION CENTER | Facility: CLINIC | Age: 61
End: 2021-06-15

## 2021-07-07 ENCOUNTER — ANESTHESIA EVENT (OUTPATIENT)
Dept: PERIOP | Facility: AMBULARY SURGERY CENTER | Age: 61
End: 2021-07-07
Payer: OTHER MISCELLANEOUS

## 2021-07-12 ENCOUNTER — OFFICE VISIT (OUTPATIENT)
Dept: LAB | Facility: HOSPITAL | Age: 61
End: 2021-07-12
Payer: OTHER MISCELLANEOUS

## 2021-07-12 DIAGNOSIS — K43.2 INCISIONAL HERNIA: ICD-10-CM

## 2021-07-12 LAB
ATRIAL RATE: 60 BPM
P AXIS: 34 DEGREES
PR INTERVAL: 140 MS
QRS AXIS: -34 DEGREES
QRSD INTERVAL: 88 MS
QT INTERVAL: 448 MS
QTC INTERVAL: 448 MS
T WAVE AXIS: -1 DEGREES
VENTRICULAR RATE: 60 BPM

## 2021-07-12 PROCEDURE — 93010 ELECTROCARDIOGRAM REPORT: CPT | Performed by: INTERNAL MEDICINE

## 2021-07-12 PROCEDURE — 93005 ELECTROCARDIOGRAM TRACING: CPT

## 2021-07-13 DIAGNOSIS — D3A.012 CARCINOID TUMOR OF ILEUM, UNSPECIFIED WHETHER MALIGNANT: Primary | ICD-10-CM

## 2021-07-14 NOTE — PRE-PROCEDURE INSTRUCTIONS
Pre-Surgery Instructions:   Medication Instructions    Brexpiprazole (REXULTI) 0 5 MG tablet Take per normal schedule     citalopram (CeleXA) 20 mg tablet Take per normal schedule     octreotide (SandoSTATIN LAR DEPOT) 10 mg IM injection kit Take per normal schedule     Pre op,medications and showering instructions reviewed-Patient has hibiclens  Instructed to avoid all ASA/NSAIDs and OTC Vit/Supp from now until after surgery  Tylenol ok prn  Pt  Verbalized an understanding of all instructions reviewed and offers no concerns at this time  Pt

## 2021-07-15 ENCOUNTER — HOSPITAL ENCOUNTER (OUTPATIENT)
Dept: INFUSION CENTER | Facility: CLINIC | Age: 61
Discharge: HOME | End: 2021-07-15
Payer: COMMERCIAL

## 2021-07-15 VITALS — TEMPERATURE: 97.8 F

## 2021-07-15 DIAGNOSIS — D3A.012 CARCINOID TUMOR OF ILEUM, UNSPECIFIED WHETHER MALIGNANT: Primary | ICD-10-CM

## 2021-07-15 PROCEDURE — 96372 THER/PROPH/DIAG INJ SC/IM: CPT

## 2021-07-15 RX ADMIN — OCTREOTIDE ACETATE 30 MG: KIT at 13:10

## 2021-07-19 ENCOUNTER — ANESTHESIA (OUTPATIENT)
Dept: PERIOP | Facility: AMBULARY SURGERY CENTER | Age: 61
End: 2021-07-19
Payer: OTHER MISCELLANEOUS

## 2021-07-19 ENCOUNTER — HOSPITAL ENCOUNTER (OUTPATIENT)
Facility: AMBULARY SURGERY CENTER | Age: 61
Setting detail: OUTPATIENT SURGERY
Discharge: HOME | End: 2021-07-19
Attending: SURGERY | Admitting: SURGERY
Payer: OTHER MISCELLANEOUS

## 2021-07-19 VITALS
HEART RATE: 62 BPM | HEIGHT: 66 IN | DIASTOLIC BLOOD PRESSURE: 66 MMHG | RESPIRATION RATE: 15 BRPM | WEIGHT: 174.38 LBS | OXYGEN SATURATION: 96 % | SYSTOLIC BLOOD PRESSURE: 135 MMHG | BODY MASS INDEX: 28.03 KG/M2 | TEMPERATURE: 98.4 F

## 2021-07-19 DIAGNOSIS — K46.9 NON-RECURRENT ABDOMINAL HERNIA WITHOUT OBSTRUCTION OR GANGRENE, UNSPECIFIED HERNIA TYPE: Primary | ICD-10-CM

## 2021-07-19 PROCEDURE — 49568 PR IMPLANT MESH HERNIA REPAIR/DEBRIDEMENT CLOSURE: CPT | Performed by: SURGERY

## 2021-07-19 PROCEDURE — C1781 MESH (IMPLANTABLE): HCPCS | Performed by: SURGERY

## 2021-07-19 PROCEDURE — 49560 PR REPAIR INCISIONAL HERNIA,REDUCIBLE: CPT | Performed by: SURGERY

## 2021-07-19 PROCEDURE — 99024 POSTOP FOLLOW-UP VISIT: CPT | Performed by: SURGERY

## 2021-07-19 DEVICE — VENTRIO ST HERNIA PATCH
Type: IMPLANTABLE DEVICE | Site: ABDOMEN | Status: FUNCTIONAL
Brand: VENTRIO ST HERNIA PATCH

## 2021-07-19 DEVICE — FIXATION SECURESTRAP OPEN  W/ CVD CANNULA: Type: IMPLANTABLE DEVICE | Site: ABDOMEN | Status: FUNCTIONAL

## 2021-07-19 RX ORDER — MORPHINE SULFATE 4 MG/ML
4 INJECTION, SOLUTION INTRAMUSCULAR; INTRAVENOUS
Status: DISCONTINUED | OUTPATIENT
Start: 2021-07-19 | End: 2021-07-19 | Stop reason: HOSPADM

## 2021-07-19 RX ORDER — ROCURONIUM BROMIDE 10 MG/ML
INJECTION, SOLUTION INTRAVENOUS AS NEEDED
Status: DISCONTINUED | OUTPATIENT
Start: 2021-07-19 | End: 2021-07-19

## 2021-07-19 RX ORDER — LIDOCAINE HYDROCHLORIDE 20 MG/ML
INJECTION, SOLUTION EPIDURAL; INFILTRATION; INTRACAUDAL; PERINEURAL AS NEEDED
Status: DISCONTINUED | OUTPATIENT
Start: 2021-07-19 | End: 2021-07-19

## 2021-07-19 RX ORDER — ONDANSETRON 2 MG/ML
4 INJECTION INTRAMUSCULAR; INTRAVENOUS EVERY 4 HOURS PRN
Status: DISCONTINUED | OUTPATIENT
Start: 2021-07-19 | End: 2021-07-19 | Stop reason: HOSPADM

## 2021-07-19 RX ORDER — SODIUM CHLORIDE, SODIUM LACTATE, POTASSIUM CHLORIDE, CALCIUM CHLORIDE 600; 310; 30; 20 MG/100ML; MG/100ML; MG/100ML; MG/100ML
125 INJECTION, SOLUTION INTRAVENOUS CONTINUOUS
Status: DISCONTINUED | OUTPATIENT
Start: 2021-07-19 | End: 2021-07-19 | Stop reason: HOSPADM

## 2021-07-19 RX ORDER — MIDAZOLAM HYDROCHLORIDE 2 MG/2ML
INJECTION, SOLUTION INTRAMUSCULAR; INTRAVENOUS AS NEEDED
Status: DISCONTINUED | OUTPATIENT
Start: 2021-07-19 | End: 2021-07-19

## 2021-07-19 RX ORDER — FENTANYL CITRATE 50 UG/ML
INJECTION, SOLUTION INTRAMUSCULAR; INTRAVENOUS AS NEEDED
Status: DISCONTINUED | OUTPATIENT
Start: 2021-07-19 | End: 2021-07-19

## 2021-07-19 RX ORDER — OXYCODONE HYDROCHLORIDE 5 MG/1
5 TABLET ORAL EVERY 4 HOURS PRN
Status: DISCONTINUED | OUTPATIENT
Start: 2021-07-19 | End: 2021-07-19 | Stop reason: HOSPADM

## 2021-07-19 RX ORDER — CEFAZOLIN SODIUM 1 G/3ML
INJECTION, POWDER, FOR SOLUTION INTRAMUSCULAR; INTRAVENOUS AS NEEDED
Status: DISCONTINUED | OUTPATIENT
Start: 2021-07-19 | End: 2021-07-19

## 2021-07-19 RX ORDER — PROPOFOL 10 MG/ML
INJECTION, EMULSION INTRAVENOUS AS NEEDED
Status: DISCONTINUED | OUTPATIENT
Start: 2021-07-19 | End: 2021-07-19

## 2021-07-19 RX ORDER — ONDANSETRON 2 MG/ML
INJECTION INTRAMUSCULAR; INTRAVENOUS AS NEEDED
Status: DISCONTINUED | OUTPATIENT
Start: 2021-07-19 | End: 2021-07-19

## 2021-07-19 RX ORDER — MAGNESIUM HYDROXIDE 1200 MG/15ML
LIQUID ORAL AS NEEDED
Status: DISCONTINUED | OUTPATIENT
Start: 2021-07-19 | End: 2021-07-19 | Stop reason: HOSPADM

## 2021-07-19 RX ORDER — KETOROLAC TROMETHAMINE 30 MG/ML
INJECTION, SOLUTION INTRAMUSCULAR; INTRAVENOUS AS NEEDED
Status: DISCONTINUED | OUTPATIENT
Start: 2021-07-19 | End: 2021-07-19

## 2021-07-19 RX ORDER — OXYCODONE HYDROCHLORIDE 5 MG/1
5 TABLET ORAL EVERY 4 HOURS PRN
Qty: 10 TABLET | Refills: 0 | Status: SHIPPED | OUTPATIENT
Start: 2021-07-19 | End: 2021-07-29

## 2021-07-19 RX ORDER — FENTANYL CITRATE/PF 50 MCG/ML
25 SYRINGE (ML) INJECTION
Status: DISCONTINUED | OUTPATIENT
Start: 2021-07-19 | End: 2021-07-19 | Stop reason: HOSPADM

## 2021-07-19 RX ORDER — SODIUM CHLORIDE, SODIUM LACTATE, POTASSIUM CHLORIDE, CALCIUM CHLORIDE 600; 310; 30; 20 MG/100ML; MG/100ML; MG/100ML; MG/100ML
INJECTION, SOLUTION INTRAVENOUS CONTINUOUS PRN
Status: DISCONTINUED | OUTPATIENT
Start: 2021-07-19 | End: 2021-07-19

## 2021-07-19 RX ORDER — DIPHENHYDRAMINE HYDROCHLORIDE 50 MG/ML
12.5 INJECTION INTRAMUSCULAR; INTRAVENOUS ONCE AS NEEDED
Status: DISCONTINUED | OUTPATIENT
Start: 2021-07-19 | End: 2021-07-19 | Stop reason: HOSPADM

## 2021-07-19 RX ORDER — BUPIVACAINE HYDROCHLORIDE 2.5 MG/ML
INJECTION, SOLUTION EPIDURAL; INFILTRATION; INTRACAUDAL AS NEEDED
Status: DISCONTINUED | OUTPATIENT
Start: 2021-07-19 | End: 2021-07-19 | Stop reason: HOSPADM

## 2021-07-19 RX ORDER — ONDANSETRON 2 MG/ML
4 INJECTION INTRAMUSCULAR; INTRAVENOUS ONCE AS NEEDED
Status: DISCONTINUED | OUTPATIENT
Start: 2021-07-19 | End: 2021-07-19 | Stop reason: HOSPADM

## 2021-07-19 RX ORDER — HYDROMORPHONE HCL/PF 1 MG/ML
0.5 SYRINGE (ML) INJECTION
Status: DISCONTINUED | OUTPATIENT
Start: 2021-07-19 | End: 2021-07-19 | Stop reason: HOSPADM

## 2021-07-19 RX ADMIN — ROCURONIUM BROMIDE 50 MG: 10 INJECTION, SOLUTION INTRAVENOUS at 12:06

## 2021-07-19 RX ADMIN — MIDAZOLAM HYDROCHLORIDE 2 MG: 1 INJECTION, SOLUTION INTRAMUSCULAR; INTRAVENOUS at 11:58

## 2021-07-19 RX ADMIN — FENTANYL CITRATE 25 MCG: 50 INJECTION INTRAMUSCULAR; INTRAVENOUS at 13:34

## 2021-07-19 RX ADMIN — CEFAZOLIN SODIUM 2000 MG: 1 INJECTION, POWDER, FOR SOLUTION INTRAMUSCULAR; INTRAVENOUS at 11:58

## 2021-07-19 RX ADMIN — OXYCODONE HYDROCHLORIDE 5 MG: 5 TABLET ORAL at 14:24

## 2021-07-19 RX ADMIN — SODIUM CHLORIDE, SODIUM LACTATE, POTASSIUM CHLORIDE, AND CALCIUM CHLORIDE: .6; .31; .03; .02 INJECTION, SOLUTION INTRAVENOUS at 11:54

## 2021-07-19 RX ADMIN — MORPHINE SULFATE 4 MG: 4 INJECTION INTRAVENOUS at 15:18

## 2021-07-19 RX ADMIN — FENTANYL CITRATE 25 MCG: 50 INJECTION INTRAMUSCULAR; INTRAVENOUS at 13:39

## 2021-07-19 RX ADMIN — SUGAMMADEX 300 MG: 100 INJECTION, SOLUTION INTRAVENOUS at 13:11

## 2021-07-19 RX ADMIN — FENTANYL CITRATE 100 MCG: 50 INJECTION, SOLUTION INTRAMUSCULAR; INTRAVENOUS at 12:06

## 2021-07-19 RX ADMIN — KETOROLAC TROMETHAMINE 15 MG: 30 INJECTION, SOLUTION INTRAMUSCULAR at 12:53

## 2021-07-19 RX ADMIN — PROPOFOL 200 MG: 10 INJECTION, EMULSION INTRAVENOUS at 12:06

## 2021-07-19 RX ADMIN — ONDANSETRON 4 MG: 2 INJECTION INTRAMUSCULAR; INTRAVENOUS at 12:06

## 2021-07-19 RX ADMIN — SODIUM CHLORIDE, SODIUM LACTATE, POTASSIUM CHLORIDE, AND CALCIUM CHLORIDE: .6; .31; .03; .02 INJECTION, SOLUTION INTRAVENOUS at 12:27

## 2021-07-19 RX ADMIN — LIDOCAINE HYDROCHLORIDE 100 MG: 20 INJECTION, SOLUTION EPIDURAL; INFILTRATION; INTRACAUDAL at 12:06

## 2021-07-19 NOTE — H&P
History and Physical -General Surgical Care   Jason White 62 y o  female MRN: [id number]  Unit/Bed#: OR [place] Encounter: [id number]       Principal Problem:  Incisional hernia    HPI: Jason White is a 62y o  year old female who presents with incisional hernia  Please see office note from 05/12/2021        Review of Systems    Historical Information   Past Medical History:   Diagnosis Date    Cancer [facility]) 2019    neuroendocrine tumor    Psychiatric disorder     Scoliosis     Strep throat     Weight gain      Past Surgical History:   Procedure Laterality Date    BREAST BIOPSY Right 2016    benign    BREAST CYST EXCISION Right     in 30's-benign    CHOLECYSTECTOMY N/A 8/22/2019    Procedure: CHOLECYSTECTOMY;  Surgeon: Aleksandr Stover MD;  [facility];  Service: Surgical Oncology    COLECTOMY TOTAL Right 8/22/2019    Procedure: HEMICOLECTOMY;  Surgeon: Aleksandr Stover MD;  [facility];  Service: Surgical Oncology    HYSTERECTOMY  2010    LAPAROTOMY N/A 8/22/2019    Procedure: LAPAROTOMY EXPLORATORY;  Surgeon: Aleksandr Stover MD;  [facility];  Service: Surgical Oncology    PARTIAL HYSTERECTOMY      AR ABLTJ 1/> LVR BRYNN PRQ RF N/A 8/22/2019    Procedure: ABLATION MICROWAVE; INTRAOPERATIVE ULTRASOUND OF THE LIVER;  Surgeon: Aleksandr Stover MD;  [facility];  Service: Surgical Oncology    TUBAL LIGATION      US GUIDED BREAST BIOPSY RIGHT COMPLETE Right 3/19/2021     Social History   Social History     Substance and Sexual Activity   Alcohol Use Not Currently     Social History     Substance and Sexual Activity   Drug Use Not Currently     Social History     Tobacco Use   Smoking Status Never Smoker   Smokeless Tobacco Never Used     Family History   Problem Relation Age of Onset    Thyroid cancer Mother 76    Multiple myeloma Father 68    No Known Problems Maternal Grandmother     No Known Problems Paternal Grandmother     No Known Problems Maternal Aunt     No Known Problems Paternal Aunt     No Known Problems Paternal Aunt     No Known Problems Paternal Aunt     Breast cancer Other         age unknown    No Known Problems Brother        Meds/Allergies     Medications Prior to Admission   Medication    Brexpiprazole (REXULTI) 0 5 MG tablet    citalopram (CeleXA) 20 mg tablet    octreotide (SandoSTATIN LAR DEPOT) 10 mg IM injection kit     No current facility-administered medications for this encounter  Allergies   Allergen Reactions    Prednisone Other (See Comments) and Confusion     Psychosis            Blood pressure 127/72, pulse 60, temperature (!) 97 °F (36 1 °C), temperature source Temporal, resp  rate 16, height 5' 6" (1 676 m), weight 79 1 kg (174 lb 6 oz), SpO2 96 %, not currently breastfeeding  No intake or output data in the 24 hours ending 07/19/21 1158    PHYSICAL EXAM  General appearance: alert and oriented, in no acute distress  Lungs: clear to auscultation bilaterally  Heart: regular rate and rhythm, S1, S2 normal, no murmur, click, rub or gallop  Abdomen: soft, non-tender; bowel sounds normal; no masses,  no organomegaly  Large midline incision  Incisional hernia at and just above the umbilicus  Soft and reducible  Rectal: deferred  Skin: Skin color, texture, turgor normal  No rashes or lesions    Lab Results:   No visits with results within 1 Day(s) from this visit     Latest known visit with results is:   Office Visit on 07/12/2021   Component Date Value    Ventricular Rate 07/12/2021 60     Atrial Rate 07/12/2021 60     CA Interval 07/12/2021 140     QRSD Interval 07/12/2021 88     QT Interval 07/12/2021 448     QTC Interval 07/12/2021 448     P Axis 07/12/2021 34     QRS Axis 07/12/2021 -34     T Wave Axis 07/12/2021 -1      Imaging Studies: I have personally reviewed pertinent films in PACS    ASSESSMENT:  Incisional hernia    PLAN:  Risks and benefits for incisional hernia repair discussed with her and her  including potential for bowel injury or recurrence and she agrees to proceed    Counseling / Coordination of Care  Total time spent today  20 minutes  Greater than 50% of total time was spent with the patient and / or family counseling and / or coordination of care

## 2021-07-19 NOTE — DISCHARGE INSTRUCTIONS
Please call the office when you leave to schedule an appointment to be seen in 2-3 weeks    Activity:    Do not lift more than 25 lb for 4 weeks post-operatively                 Walking is encouraged  Normal daily activities including climbing steps are okay  Do not engage in strenuous activity or contact sports for 4-6 weeks post-operatively    Return to work:   Return to work to be discussed at first post-operative visit    Diet:   You may return to your normal heart healthy diet    Wound Care: Your wound is closed with skin glue  It is okay to shower  Wash incision gently with soap and water and pat dry  Do not soak incisions in bath water or swim for two weeks  Do not apply any creams or ointments  Apply ice as needed to wound    Pain Medication:   Please take as directed  No driving while taking narcotic pain medications    Other:  If you have questions after discharge please call the office      If you have increased pain, fever >101 5, increased drainage, redness or a bad smell at your surgery site, please call us immediately or come directly to the Emergency Room

## 2021-07-19 NOTE — ANESTHESIA PREPROCEDURE EVALUATION
Procedure:  INCISIONAL HERNIA REPAIR (N/A Abdomen)    Relevant Problems   GI/HEPATIC   (+) 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)      MUSCULOSKELETAL   (+) Chronic bilateral low back pain   (+) Scoliosis of thoracic spine      NEURO/PSYCH   (+) Depression        Physical Exam    Airway    Mallampati score: III  TM Distance: >3 FB       Dental       Cardiovascular  Rhythm: regular,     Pulmonary  Breath sounds clear to auscultation,     Other Findings        Anesthesia Plan  ASA Score- 3     Anesthesia Type- IV sedation with anesthesia with ASA Monitors  Additional Monitors:   Airway Plan:           Plan Factors-Exercise tolerance (METS): >4 METS  Patient is not a current smoker  Induction- intravenous  Postoperative Plan- Plan for postoperative opioid use       Informed Consent-

## 2021-07-19 NOTE — OP NOTE
OPERATIVE REPORT  PATIENT NAME: Olu Salinas    :  1960  MRN: [id number]  Pt [facility]    SURGERY DATE: 2021    Surgeon(s) and Role:     * Nel Jimenez DO - Primary     * Janneth Lackey MD - Assisting    Preop Diagnosis:  Incisional hernia [K43 2]    Post-Op Diagnosis Codes:     * Incisional hernia [K43 2] x3    Procedure(s) (LRB):  INCISIONAL HERNIA REPAIR (N/A) x3 with 8 x 12 cm Ventrio mesh    Specimen(s):  * No specimens in log *    Estimated Blood Loss:   Minimal    Drains:  * No LDAs found *    Anesthesia Type:   General/local    Operative Indications:  Incisional hernia [K43 2]      Operative Findings:  Incisional hernia x3  Height 66 in weight 79 kg/174 lb BMI 28  ASA 2  Wound class 2    Complications:   None    Procedure and Technique:  Patient was brought the operative suite and identified by visualization, conversation, by armband  Sequential compression pumps were placed  She was given Ancef perioperatively  Once under general anesthesia abdomen is then prepped and draped in a sterile fashion  Time-out was performed was assured that the prep was dry  Local was instilled at her old incision around the umbilicus  Skin incision was made through her old scar extended somewhat superiorly  Underlying subcutaneous tissue was divided hot cautery  Hernia sac was initially noted about the umbilicus  This was freed up  We did enter into it and there was small amount omentum within it which usually pushed away  Trimmed away the sac  Then reach more superiorly could feel 2 further hernias in the epigastric region  These fascial bridges were cut created 1 larger defect  Some omentum and small bowel to the undersurface of the fascia were carefully pushed away  We had adequate space under the fascial openings for our mesh  An 8 x 12 cm Ventrio mesh was chosen  Was anchored at the 12 and 6:00 a m  Positions with 1  PDS suture    An Mempile SecureStrap device was then used to anchor the circumference of the mesh to the undersurface of the fascia in 1 cm increments  Assured no bowel was in the way  Once adequately placed, irrigation was carried out  More local was instilled  Fascia was then closed on top of the mesh using 1  PDS in serial figure-of-eight fashion  More local was instilled  Irrigation is carried out  Two 0 Vicryl was used to reapproximate subcutaneous tissues  Four Monocryl was used to close skin in a subcuticular fashion  Wounds washed and dried  Sterile skin glue was applied  She was awake in the operating returned to the recovery area in stable condition having tolerated the procedure well     I was present for the entire procedure    Patient Disposition:  PACU     SIGNATURE: Jasiel Joshua DO  DATE: July 19, 2021  TIME: 1:00 PM

## 2021-07-19 NOTE — ANESTHESIA POSTPROCEDURE EVALUATION
Post-Op Assessment Note    CV Status:  Stable  Pain Score: 0    Pain management: adequate     Mental Status:  Awake and alert   Hydration Status:  Stable   PONV Controlled:  None   Airway Patency:  Patent and adequate      Post Op Vitals Reviewed: Yes      Staff: CRNA, Anesthesiologist         No complications documented      BP   117/63   Temp  98 5   Pulse  62   Resp   16   SpO2   100%

## 2021-07-24 ENCOUNTER — TELEPHONE (OUTPATIENT)
Dept: OTHER | Facility: OTHER | Age: 61
End: 2021-07-24

## 2021-07-24 ENCOUNTER — DOCUMENTATION (OUTPATIENT)
Dept: OTHER | Facility: HOSPITAL | Age: 61
End: 2021-07-24

## 2021-07-24 DIAGNOSIS — L08.9 SKIN INFECTION: Primary | ICD-10-CM

## 2021-07-24 RX ORDER — SULFAMETHOXAZOLE AND TRIMETHOPRIM 800; 160 MG/1; MG/1
1 TABLET ORAL EVERY 12 HOURS SCHEDULED
Qty: 10 TABLET | Refills: 0 | Status: SHIPPED | OUTPATIENT
Start: 2021-07-24 | End: 2021-07-29

## 2021-07-24 NOTE — PROGRESS NOTES
Patient called regarding redness over her incision  Called back the patient and she said that she has no fever, tolerating diet  No increase in pain  She says that she has redness over part of her incision  No purulent drainage  Due to the fact that she has a mesh I am sending a prescription for 5 days of antibiotics  She will contact me if things get worse overnight

## 2021-07-26 ENCOUNTER — HOSPITAL ENCOUNTER (OUTPATIENT)
Dept: CT IMAGING | Facility: HOSPITAL | Age: 61
Discharge: HOME | End: 2021-07-26
Attending: INTERNAL MEDICINE
Payer: COMMERCIAL

## 2021-07-26 ENCOUNTER — APPOINTMENT (OUTPATIENT)
Dept: LAB | Facility: HOSPITAL | Age: 61
End: 2021-07-26
Attending: INTERNAL MEDICINE
Payer: COMMERCIAL

## 2021-07-26 DIAGNOSIS — C7B.02 METASTATIC MALIGNANT CARCINOID TUMOR TO LIVER (HCC): ICD-10-CM

## 2021-07-26 PROCEDURE — 71260 CT THORAX DX C+: CPT

## 2021-07-26 PROCEDURE — 74177 CT ABD & PELVIS W/CONTRAST: CPT

## 2021-07-26 RX ADMIN — IOHEXOL 100 ML: 350 INJECTION, SOLUTION INTRAVENOUS at 09:26

## 2021-07-29 ENCOUNTER — TELEMEDICINE (OUTPATIENT)
Dept: SURGERY | Facility: CLINIC | Age: 61
End: 2021-07-29

## 2021-07-29 DIAGNOSIS — K43.2 INCISIONAL HERNIA OF ANTERIOR ABDOMINAL WALL WITHOUT OBSTRUCTION OR GANGRENE: Primary | ICD-10-CM

## 2021-07-29 PROCEDURE — 99024 POSTOP FOLLOW-UP VISIT: CPT | Performed by: SURGERY

## 2021-07-29 NOTE — PROGRESS NOTES
Virtual Regular Visit    Verification of patient location:    Patient is located in the following state in which I hold an active license [place]      Assessment/Plan:    Problem List Items Addressed This Visit     None      Visit Diagnoses     Incisional hernia of anterior abdominal wall without obstruction or gangrene    -  Primary         10 day status post incisional hernia repair  I find no need to continue with any antibiotics  Encouraged her to ambulate and walk more rather than just around the house  She does have an appointment in 1 week in the office       Reason for visit is No chief complaint on file  Encounter provider Jeremie Mendoza DO    Provider located at [facility]  [id number] [place] [address] [address]  [facility] [id number] [phone]      Recent Visits  No visits were found meeting these conditions  Showing recent visits within past 7 days and meeting all other requirements  Future Appointments  No visits were found meeting these conditions  Showing future appointments within next 150 days and meeting all other requirements       The patient was identified by name and date of birth  Leopold Mura was informed that this is a telemedicine visit and that the visit is being conducted [place] [facility] and patient was informed that this is a secure, HIPAA-compliant platform  She agrees to proceed     My office door was closed  No one else was in the room  She acknowledged consent and understanding of privacy and security of the video platform  The patient has agreed to participate and understands they can discontinue the visit at any time  Patient is aware this is a billable service  Subjective  Leopold Mura is a 62 y o  female  Is 10 days out from incisional hernia repair x3  States he had a temperature of 99  Feeling a little tired and fatigued  She has not really walked much since her surgery  Concerned about some mild redness    Apparently called the covering surgeon over the weekend and was given Bactrim  She also had a CT scan done in follow-up of for underlying carcinoid  This did mention expected fluid around the   However the radiology report does make reference to that a mesh infection could possibly need to be ruled out  Her pain is improving         HPI     Past Medical History:   Diagnosis Date    Cancer [facility]) 2019    neuroendocrine tumor    Psychiatric disorder     Scoliosis     Strep throat     Weight gain        Past Surgical History:   Procedure Laterality Date    BREAST BIOPSY Right 2016    benign    BREAST CYST EXCISION Right     in 30's-benign    CHOLECYSTECTOMY N/A 8/22/2019    Procedure: CHOLECYSTECTOMY;  Surgeon: Luis Sun MD;  [facility];  Service: Surgical Oncology    COLECTOMY TOTAL Right 8/22/2019    Procedure: HEMICOLECTOMY;  Surgeon: Luis Sun MD;  [facility];  Service: Surgical Oncology    HYSTERECTOMY  2010    LAPAROTOMY N/A 8/22/2019    Procedure: LAPAROTOMY EXPLORATORY;  Surgeon: Luis Sun MD;  [facility];  Service: Surgical Oncology    PARTIAL HYSTERECTOMY      NV ABLTJ 1/> LVR BRYNN PRQ RF N/A 8/22/2019    Procedure: ABLATION MICROWAVE; INTRAOPERATIVE ULTRASOUND OF THE LIVER;  Surgeon: Luis Sun MD;  [facility];  Service: Surgical Oncology    NV REPAIR INCISIONAL HERNIA,REDUCIBLE N/A 7/19/2021    Procedure: INCISIONAL HERNIA REPAIR;  Surgeon: Vanesa Hassan DO;  [facility];  Service: General    TUBAL LIGATION      US GUIDED BREAST BIOPSY RIGHT COMPLETE Right 3/19/2021       Current Outpatient Medications   Medication Sig Dispense Refill    Brexpiprazole (REXULTI) 0 5 MG tablet Take 0 5 mg by mouth daily at bedtime       citalopram (CeleXA) 20 mg tablet Take 1 tablet (20 mg total) by mouth daily (Patient taking differently: Take 20 mg by mouth daily at bedtime ) 30 tablet 2    octreotide (SandoSTATIN LAR DEPOT) 10 mg IM injection kit Inject 30 mg into a muscle every 28 days       oxyCODONE (ROXICODONE) 5 mg immediate release tablet Take 1 tablet (5 mg total) by mouth every 4 (four) hours as needed for moderate pain for up to 10 daysMax Daily Amount: 30 mg 10 tablet 0    sulfamethoxazole-trimethoprim (BACTRIM DS) 800-160 mg per tablet Take 1 tablet by mouth every 12 (twelve) hours for 5 days 10 tablet 0     No current facility-administered medications for this visit  Allergies   Allergen Reactions    Prednisone Other (See Comments) and Confusion     Psychosis        Review of Systems    Video Exam     incision appears to have some resolving ecchymosis  Does not appear to have any drainage  Skin glue was intact  She looked well in talking to her on the   Video phone call    I spent Ten minutes directly with the patient during this visit    VIRTUAL VISIT Jermaine verbally agrees to participate in [employer]  Pt is aware that [employer] could be limited without vital signs or the ability to perform a full hands-on physical [place] [employer] understands she or the provider may request at any time to terminate the video visit and request the patient to seek care or treatment in person

## 2021-08-02 ENCOUNTER — OFFICE VISIT (OUTPATIENT)
Dept: HEMATOLOGY ONCOLOGY | Facility: CLINIC | Age: 61
End: 2021-08-02
Payer: COMMERCIAL

## 2021-08-02 VITALS
HEART RATE: 79 BPM | SYSTOLIC BLOOD PRESSURE: 148 MMHG | HEIGHT: 66 IN | WEIGHT: 175 LBS | OXYGEN SATURATION: 97 % | BODY MASS INDEX: 28.12 KG/M2 | DIASTOLIC BLOOD PRESSURE: 88 MMHG | RESPIRATION RATE: 18 BRPM

## 2021-08-02 DIAGNOSIS — C7B.02 METASTATIC MALIGNANT CARCINOID TUMOR TO LIVER (HCC): Primary | ICD-10-CM

## 2021-08-02 DIAGNOSIS — D3A.012 CARCINOID TUMOR OF ILEUM, UNSPECIFIED WHETHER MALIGNANT: ICD-10-CM

## 2021-08-02 PROCEDURE — 1036F TOBACCO NON-USER: CPT | Performed by: INTERNAL MEDICINE

## 2021-08-02 PROCEDURE — 99214 OFFICE O/P EST MOD 30 MIN: CPT | Performed by: INTERNAL MEDICINE

## 2021-08-02 PROCEDURE — 3008F BODY MASS INDEX DOCD: CPT | Performed by: INTERNAL MEDICINE

## 2021-08-02 NOTE — PROGRESS NOTES
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          History of Hematology / Oncology Illness:     Peggy Fox is a 62 y o  female who came in  to establish care with oncology    1, GI carcinoid tumor, T3N1M1  G1  - presented with diarrhea ongoing for 2 years  Colonoscopy showed polyps, biopsy showed GI carcinoid tumor     - Ki 67 less than 3 percent  Low to intermediate grade    -  PET-CT showed intra-abdominal lymphadenopathy and liver leison  Cancer marker all normal before surgery    - 2019 : s/p Right hemicolectomy, cholecystectomy, 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); Found Lymphovascular and perineural invasion are present;   T3N1M1  G1;  3/24 LN  -  :  Intraoperative  liver lesion ablation  - 2021 : CT C/a/P Showed questionable liver lesion  MRI showed liver lesion is stable  - 2021:  CT chest abdomen pelvic showed stable liver lesion  Status post hernia surgery in 2021      Current treatment:  Sandostatin, every month started in 10/2019 , plan for 2 years             Assessment / Plan:       1  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 [facility])    - will continue treatment for 3 more months, follow patient after, as for now plan to stop treatment if condition is stable and start observation  Due to financial reasons patient favor to be monitor by CT scan every 6 months          - CT chest abdomen pelvis w contrast; Future    2   Carcinoid tumor of ileum, unspecified whether malignant     - CT chest abdomen pelvis w contrast; Future         Made patient aware regarding side effects of chemotherapy, including but not limited to fatigue cytopenia, increased risk for infection, potential kidney, liver injuries neuropathies et al    Made patient aware to call MD or go to ED for any fever,  Chills, bleeding, easy bruise, unhealed wound, chest pain, abdominal pain et al            25    minutes were spent face to face with patient with greater than 50% of the time spent in counseling or coordination of care including discussions of treatment instructions  All of the patient's questions were answered to their satisfactory during this discussion  Advised pt to call if there is any further questions  Interval History:      7/2/2019 :  Patient is a 42-year-old female, with history of depression, chronic diarrhea, recently diagnosed GI neuroendocrine tumor  [facility]  Patient reported other than diarrhea, she has no flushing, no dyspnea, no abdominal pain  She has no other malignancies in the past, her diarrhea is about 3 to 4 times a day watery usually after meal   She has been evaluated by GI, her diarrhea is considered due to combination of IBS and carcinoid tumor  Patient is a nonsmoker, drinks alcohol socially  9/13/2019 :  Patient came in for follow-up  Reported after surgery has recovered very well, bowel movement back to normal   No fatigue, no sweating no flushing  12/19/2019 :  Came for follow-up  Reported doing okay, has some very mild intermittent right upper quadrant abdominal pain however normal skin color no nausea vomiting no diarrhea  Tolerating injection, no other issues  6/25/2020 :  Came for follow-up, overall doing well  Patient with recently diagnosed COVID with her  as well  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  Recovered well, never need hospitalization  Has some intermittent diarrhea, nothing concerning  No skin color change no abdominal pain distension    9/28/2020 :  Patient came in for follow-up doing well, has some mild diarrhea, otherwise no major issues  Body weight stable, no flushing  12/21/2020 :  Patient came in for follow-up  Subjectively doing okay  Mild diarrhea however not very severe and not like the initial symptoms when cancer was diagnosed  Body weight stable; no flushing    4/26/2021 : Patient came in for follow-up, subjective patient doing well  No abdominal pain blood shin  Bowel movement habits remains the same  8/2/2021:  Patient came for follow-up doing okay body weight stable  No change of bowel habits  Recently had hiatal hernia surgery went well no major issues afterwards  Problem list:       Patient Active Problem List   Diagnosis    Chronic bilateral low back pain    Depression    Scoliosis of thoracic spine    Carcinoid tumor of ileum    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 ([facility] [address] )    Other long term (current) drug therapy    Non-recurrent abdominal hernia without obstruction or gangrene         PHYSICIAL EXAMINATION:     Vital Signs:   [unfilled]  Body mass index is 28 25 kg/m²  Body surface area is 1 89 meters squared  Patient appears comfortable no major issues  Normal skin color  Status post hernia surgery      GEN: Alert, awake oriented x3, in no acute distress  HEENT- No pallor, icterus, cyanosis, no oral mucosal lesions,   LAD - no palpable cervical, clavicle, axillary, inguinal LAD  Heart- normal S1 S2, regular rate and rhythm, No murmur, rubs  Lungs- decreased breathing sound bilateral    Abdomen- soft, Non tender, bowel sounds present  Extremities- No cyanosis, clubbing, edema  Neuro- No focal neurological deficit           PAST MEDICAL HISTORY:   has a past medical history of Cancer ([facility] [address] ) (2019), Psychiatric disorder, Scoliosis, Strep throat, and Weight gain  PAST SURGICAL HISTORY:   has a past surgical history that includes Partial hysterectomy; Tubal ligation; pr abltj 1/> lvr bandar prq rf (N/A, 8/22/2019); COLECTOMY TOTAL (Right, 8/22/2019); Cholecystectomy (N/A, 8/22/2019); LAPAROTOMY (N/A, 8/22/2019);  Hysterectomy (2010); Breast cyst excision (Right); Breast biopsy (Right, 2016); US guided breast biopsy right complete (Right, 3/19/2021); and pr repair incisional hernia,reducible (N/A, 7/19/2021)  CURRENT MEDICATIONS:     Current Outpatient Medications   Medication Sig Dispense Refill    Brexpiprazole (REXULTI) 0 5 MG tablet Take 0 5 mg by mouth daily at bedtime       citalopram (CeleXA) 20 mg tablet Take 1 tablet (20 mg total) by mouth daily (Patient taking differently: Take 20 mg by mouth daily at bedtime ) 30 tablet 2    octreotide (SandoSTATIN LAR DEPOT) 10 mg IM injection kit Inject 30 mg into a muscle every 28 days        No current facility-administered medications for this visit  [unfilled]    SOCIAL HISTORY:   reports that she has never smoked  She has never used smokeless tobacco  She reports previous alcohol use  She reports previous drug use  FAMILY HISTORY:  family history includes Breast cancer in her other; Multiple myeloma (age of onset: 68) in her father; No Known Problems in her brother, maternal aunt, maternal grandmother, paternal aunt, paternal aunt, paternal aunt, and paternal grandmother; Thyroid cancer (age of onset: 76) in her mother  ALLERGIES:  is allergic to prednisone  REVIEW OF SYSTEMS:  Please note that a 14-point review of systems was performed to include Constitutional, HEENT, Respiratory, CVS, GI, , Musculoskeletal, Integumentary, Neurologic, Rheumatologic, Endocrinologic, Psychiatric, Lymphatic, and Hematologic/Oncologic systems were reviewed and are negative unless otherwise stated in HPI  Positive and negative findings pertinent to this evaluation are incorporated into the history of present illness            Lab Results   Component Value Date    SODIUM 136 07/26/2021    K 4 5 07/26/2021     07/26/2021    CO2 24 07/26/2021    AGAP 10 07/26/2021    BUN 13 07/26/2021    CREATININE 0 98 07/26/2021    GLUC 110 12/14/2020    GLUF 123 (H) 07/26/2021    CALCIUM 9 1 07/26/2021    AST 45 07/26/2021     (H) 07/26/2021    ALKPHOS 89 07/26/2021    TP 7 4 07/26/2021    TBILI 0 39 07/26/2021    EGFR 63 07/26/2021       CBC with diff:         Invalid input(s):  RBC, TOTALCELLSCOUNTED, SEGS%, GRANS%, LYMPHS%, EOS%, BASO%, ABNEUT, ABGRANS, ABLYMPHS, ABMOMOS, ABEOS, ABBASO    CMP:        Invalid input(s): ALBUMIN    IMAGING:    CT chest abdomen pelvis w contrast    (Results Pending)     Ct Small Bowel Enterography    Result Date: 6/20/2019  Narrative: CT ABDOMEN AND PELVIS WITH IV CONTRAST- ENTEROGRAPHY - WITH CONTRAST INDICATION:   D3A 012: Benign carcinoid tumor of the ileum  COMPARISON:  None  TECHNIQUE:  Contrast-enhanced CT examination of the abdomen and pelvis was performed utilizing thin section technique and after the administration of low density enteric contrast according to protocol designed specifically to obtain sensitive evaluation of the small bowel  Axial, sagittal, and coronal 2D reformatted images were created from the source data and submitted for interpretation  Radiation dose length product (DLP) for this visit:  374 7 mGy-cm   This examination, like all CT scans performed in the [facility], was performed utilizing techniques to minimize radiation dose exposure, including the use of iterative reconstruction and automated exposure control  IV Contrast:  100 mL of iohexol (OMNIPAQUE) Enteric Contrast:  1500 cc of VoLumen enteric contrast was administered  FINDINGS: ABDOMEN SMALL BOWEL: There is an enhancing nodule in the terminal ileum, adjacent to the ileocecal valve, measuring 1 9 cm  Presumably this corresponds with the known neoplasm  There is no small bowel wall thickening or obstruction  LARGE BOWEL:  Normal caliber  No focal wall thickening or pericolonic inflammatory change  There is sigmoid diverticulosis without diverticulitis  STOMACH:  Unremarkable   LOWER CHEST:  No clinically significant abnormality identified in the visualized lower chest  LIVER/BILIARY TREE:  Ill-defined enhancing lesion in the right hepatic lobe posterior segment measuring approximately 2 3 x 1 6 cm on image 2/42  Additional 1 2 cm enhancing focus at the lateral margin of the right hepatic lobe at the same level, on image 2/37  Additional enhancing lesions in the posterior segment more inferiorly, measuring 1 2 x 1 4 cm on image 2/59, and 1 8 x 1 7 cm on image 2/79  GALLBLADDER:  No calcified gallstones  No pericholecystic inflammatory change  SPLEEN:  Unremarkable  PANCREAS:  Unremarkable  ADRENAL GLANDS:  Unremarkable  KIDNEYS/URETERS:  Indeterminate 1 0 cm hypodense nodule at the lower pole of the right kidney posteriorly on image 2/106  No hydronephrosis or calculi  ABDOMINOPELVIC CAVITY: Enhancing nodule with possible necrotic component in the right lower quadrant mesentery, measuring 2 6 x 1 9 cm in short axis, suspicious for metastatic adenopathy  No ascites  No pneumoperitoneum  VESSELS:  Unremarkable for patient's age  PELVIS REPRODUCTIVE ORGANS:  Patient is status post hysterectomy  URINARY BLADDER:  Unremarkable  ABDOMINAL WALL/INGUINAL REGIONS:  Unremarkable  OSSEOUS STRUCTURES:  No acute fracture or destructive osseous lesion  Impression: 1 9 cm nodule in the terminal ileum, presumably corresponding with known neoplasm  There is a 2 6 cm right lower quadrant mesenteric nodule which is suspicious for metastatic disease  Multiple enhancing lesions in the right hepatic lobe, concerning for metastatic disease  Suggest hepatic protocol MRI for further evaluation  Indeterminate 1 0 cm hypodense right renal nodule  Suggest ultrasound for further evaluation  The study was marked in EPIC for significant notification  Workstation performed: [id number]     Nm Pet Ct Skull Base To Mid Thigh    Result Date: 6/24/2019  Narrative: PET/CT SCAN INDICATION: Suspected carcinoid tumor of the terminal ileum  Initial staging    D3A 012: Benign carcinoid tumor of the ileum MODIFIER: PI COMPARISON: CT small bowel enterography 6/18/2019 CELL TYPE:  preliminary pathology of minute group of epithelial cells with neuroendocrine differentiation of uncertain significance (bx terminal ileum polyp 6/7/19) TECHNIQUE:   8 6 mCi F-18-FDG administered IV  Multiplanar attenuation corrected and non attenuation corrected PET images are available for interpretation, and contiguous, low dose, axial CT sections were obtained from the skull base through the femurs   Intravenous contrast material was not utilized  This examination, like all CT scans performed in the [facility], was performed utilizing techniques to minimize radiation dose exposure, including the use of iterative reconstruction and automated exposure control  Fasting serum glucose: 95 mg/dl FINDINGS: VISUALIZED BRAIN:   No acute abnormalities are seen  HEAD/NECK:   There is a physiologic distribution of FDG  Fairly symmetric FDG uptake in Waldeyer's ring is likely physiologic  No FDG avid cervical adenopathy is seen  CT images: Unremarkable  CHEST:   No FDG avid soft tissue lesions are seen  CT images: Unremarkable  ABDOMEN/PELVIS:  Mild focal FDG uptake at the terminal ileum, SUV max of 3 1 where there is a known lesion on prior CT  The activity is similar to normal bowel activity  Scattered vague hypodense lesions in the liver, not FDG avid above liver background, SUV max of 3 1  These are better seen on the prior contrast CT where they were hyperdense and enhancing  Enlarged right mesenteric lymph node again noted, with mild FDG uptake, SUV max of 3 0  This measures 2 6 x 2 0 cm image 179 series 3  Linear FDG uptake at the right hip may be physiologic or related to tendinitis  CT images: Otherwise unremarkable  OSSEOUS STRUCTURES: No FDG avid lesions are seen  CT images: S-shaped scoliosis noted of the thoracolumbar spine  Scattered multilevel degenerative changes of the spine       Impression: 1   Mild FDG uptake in the region of the known terminal ileal lesion and in the right mesenteric lymph node  Findings favor low-grade neoplasm  2  The scattered hepatic lesions do not demonstrate significant FDG uptake above liver background   3   Given the pathology findings of suspected carcinoid disease, gallium-68 Dotatate scan may be a more sensitive test  Workstation performed: [id number]

## 2021-08-04 ENCOUNTER — OFFICE VISIT (OUTPATIENT)
Dept: SURGERY | Facility: CLINIC | Age: 61
End: 2021-08-04

## 2021-08-04 DIAGNOSIS — K43.2 INCISIONAL HERNIA OF ANTERIOR ABDOMINAL WALL WITHOUT OBSTRUCTION OR GANGRENE: Primary | ICD-10-CM

## 2021-08-04 PROCEDURE — 99024 POSTOP FOLLOW-UP VISIT: CPT | Performed by: SURGERY

## 2021-08-04 NOTE — LETTER
August 4, 2021     Khadra Brennan[address],[facility] [address]  [address] [address]    Patient: Laure Pa   YOB: 1960   Date of Visit: 8/4/2021       Dear Dr Zaragoza Mercury:    Thank you for referring Radha Cruz to me for evaluation  Below are my notes for this consultation  If you have questions, please do not hesitate to call me  I look forward to following your patient along with you  Sincerely,        Jayne Vidal DO        CC: No Recipients  Jayne Vidal DO  8/4/2021 11:53 AM  Sign when Signing Visit  Assessment/Plan:    Diagnoses and all orders for this visit:    Incisional hernia of anterior abdominal wall without obstruction or gangrene    Doing quite well status post incisional hernia repair x3  Likely feel healing ridge for the next few months  May resume unrestricted activities as of 08/19/2021  Postoperative restrictions reviewed  All questions answered  ______________________________________________________  HPI: Patient presents post operatively  Incisional hernia repair  X3 7/19/2021 with 8 x 12 cm mesh   No particular complaints  Doing quite well  ROS:  General ROS: negative for - chills, fatigue, fever or night sweats, weight loss  Respiratory ROS: no cough, shortness of breath, or wheezing  Cardiovascular ROS: no chest pain or dyspnea on exertion  Genito-Urinary ROS: no dysuria, trouble voiding, or hematuria  Musculoskeletal ROS: negative for - gait disturbance, joint pain or muscle pain  Neurological ROS: no TIA or stroke symptoms  GI ROS: see HPI  Skin ROS: no new rashes or lesions   Lymphatic ROS: no new adenopathy noted by pt     GYN ROS: see HPI, no new GYN history or bleeding noted  Psy ROS: no new mental or behavioral disturbances         Patient Active Problem List   Diagnosis    Chronic bilateral low back pain    Depression    Scoliosis of thoracic spine    Carcinoid tumor of ileum    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)    Other long term (current) drug therapy    Non-recurrent abdominal hernia without obstruction or gangrene       Allergies:  Prednisone      Current Outpatient Medications:     Brexpiprazole (REXULTI) 0 5 MG tablet, Take 0 5 mg by mouth daily at bedtime , Disp: , Rfl:     citalopram (CeleXA) 20 mg tablet, Take 1 tablet (20 mg total) by mouth daily (Patient taking differently: Take 20 mg by mouth daily at bedtime ), Disp: 30 tablet, Rfl: 2    octreotide (SandoSTATIN LAR DEPOT) 10 mg IM injection kit, Inject 30 mg into a muscle every 28 days , Disp: , Rfl:     Past Medical History:   Diagnosis Date    Cancer ([facility] [address] ) 2019    neuroendocrine tumor    Psychiatric disorder     Scoliosis     Strep throat     Weight gain        Past Surgical History:   Procedure Laterality Date    BREAST BIOPSY Right 2016    benign    BREAST CYST EXCISION Right     in 30's-benign    CHOLECYSTECTOMY N/A 8/22/2019    Procedure: CHOLECYSTECTOMY;  Surgeon: Jacquie Billingsley MD;  [facility];  Service: Surgical Oncology    COLECTOMY TOTAL Right 8/22/2019    Procedure: HEMICOLECTOMY;  Surgeon: Jacquie Billingsley MD;  [facility];  Service: Surgical Oncology    HYSTERECTOMY  2010    LAPAROTOMY N/A 8/22/2019    Procedure: LAPAROTOMY EXPLORATORY;  Surgeon: Jacquie Billingsley MD;  [facility];  Service: Surgical Oncology    PARTIAL HYSTERECTOMY      TN ABLTJ 1/> LVR BRYNN PRQ RF N/A 8/22/2019    Procedure: ABLATION MICROWAVE; INTRAOPERATIVE ULTRASOUND OF THE LIVER;  Surgeon: Jacquie Billingsley MD;  [facility];  Service: Surgical Oncology    TN REPAIR INCISIONAL HERNIA,REDUCIBLE N/A 7/19/2021    Procedure: INCISIONAL HERNIA REPAIR;  Surgeon: Laila Rodas DO;  [facility];  Service: General    TUBAL LIGATION      US GUIDED BREAST BIOPSY RIGHT COMPLETE Right 3/19/2021       Family History   Problem Relation Age of Onset    Thyroid cancer Mother 76    Multiple myeloma Father 68    No Known Problems Maternal Grandmother     No Known Problems Paternal Grandmother     No Known Problems Maternal Aunt     No Known Problems Paternal Aunt     No Known Problems Paternal Aunt     No Known Problems Paternal Aunt     Breast cancer Other         age unknown    No Known Problems Brother         reports that she has never smoked  She has never used smokeless tobacco  She reports previous alcohol use  She reports previous drug use  PHYSICAL EXAM    There were no vitals taken for this visit  General: normal, cooperative, no distress  Abdominal: soft, nondistended or nontender  Incision: clean, dry, and intact and healing well  Healing ridge as expected        [employer], DO    Date: 8/4/2021 Time: 11:52 AM

## 2021-08-04 NOTE — PROGRESS NOTES
Assessment/Plan:    Diagnoses and all orders for this visit:    Incisional hernia of anterior abdominal wall without obstruction or gangrene    Doing quite well status post incisional hernia repair x3  Likely feel healing ridge for the next few months  May resume unrestricted activities as of 08/19/2021  Postoperative restrictions reviewed  All questions answered  ______________________________________________________  HPI: Patient presents post operatively  Incisional hernia repair  X3 7/19/2021 with 8 x 12 cm mesh   No particular complaints  Doing quite well  ROS:  General ROS: negative for - chills, fatigue, fever or night sweats, weight loss  Respiratory ROS: no cough, shortness of breath, or wheezing  Cardiovascular ROS: no chest pain or dyspnea on exertion  Genito-Urinary ROS: no dysuria, trouble voiding, or hematuria  Musculoskeletal ROS: negative for - gait disturbance, joint pain or muscle pain  Neurological ROS: no TIA or stroke symptoms  GI ROS: see HPI  Skin ROS: no new rashes or lesions   Lymphatic ROS: no new adenopathy noted by pt     GYN ROS: see HPI, no new GYN history or bleeding noted  Psy ROS: no new mental or behavioral disturbances         Patient Active Problem List   Diagnosis    Chronic bilateral low back pain    Depression    Scoliosis of thoracic spine    Carcinoid tumor of ileum    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)    Other long term (current) drug therapy    Non-recurrent abdominal hernia without obstruction or gangrene       Allergies:  Prednisone      Current Outpatient Medications:     Brexpiprazole (REXULTI) 0 5 MG tablet, Take 0 5 mg by mouth daily at bedtime , Disp: , Rfl:     citalopram (CeleXA) 20 mg tablet, Take 1 tablet (20 mg total) by mouth daily (Patient taking differently: Take 20 mg by mouth daily at bedtime ), Disp: 30 tablet, Rfl: 2    octreotide (SandoSTATIN LAR DEPOT) 10 mg IM injection kit, Inject 30 mg into a muscle every 28 days , Disp: , Rfl:     Past Medical History:   Diagnosis Date    Cancer ([address] ) 2019    neuroendocrine tumor    Psychiatric disorder     Scoliosis     Strep throat     Weight gain        Past Surgical History:   Procedure Laterality Date    BREAST BIOPSY Right 2016    benign    BREAST CYST EXCISION Right     in 30's-benign    CHOLECYSTECTOMY N/A 8/22/2019    Procedure: CHOLECYSTECTOMY;  Surgeon: Vera Mackey MD;  [facility];  Service: Surgical Oncology    COLECTOMY TOTAL Right 8/22/2019    Procedure: HEMICOLECTOMY;  Surgeon: Vera Mackey MD;  [facility];  Service: Surgical Oncology    HYSTERECTOMY  2010    LAPAROTOMY N/A 8/22/2019    Procedure: LAPAROTOMY EXPLORATORY;  Surgeon: Vera Mackey MD;  [facility];  Service: Surgical Oncology    PARTIAL HYSTERECTOMY      VT ABLTJ 1/> LVR BRYNN PRQ RF N/A 8/22/2019    Procedure: ABLATION MICROWAVE; INTRAOPERATIVE ULTRASOUND OF THE LIVER;  Surgeon: Vera Mackey MD;  [facility];  Service: Surgical Oncology    VT REPAIR INCISIONAL HERNIA,REDUCIBLE N/A 7/19/2021    Procedure: INCISIONAL HERNIA REPAIR;  Surgeon: Samra Juan DO;  [facility];  Service: General    TUBAL LIGATION      US GUIDED BREAST BIOPSY RIGHT COMPLETE Right 3/19/2021       Family History   Problem Relation Age of Onset    Thyroid cancer Mother 76    Multiple myeloma Father 68    No Known Problems Maternal Grandmother     No Known Problems Paternal Grandmother     No Known Problems Maternal Aunt     No Known Problems Paternal Aunt     No Known Problems Paternal Aunt     No Known Problems Paternal Aunt     Breast cancer Other         age unknown    No Known Problems Brother         reports that she has never smoked  She has never used smokeless tobacco  She reports previous alcohol use  She reports previous drug use  PHYSICAL EXAM    There were no vitals taken for this visit      General: normal, cooperative, no distress  Abdominal: soft, nondistended or nontender  Incision: clean, dry, and intact and healing well  Healing ridge as expected        Jassi Reyes DO    Date: 8/4/2021 Time: 11:52 AM

## 2021-08-12 ENCOUNTER — HOSPITAL ENCOUNTER (OUTPATIENT)
Dept: INFUSION CENTER | Facility: CLINIC | Age: 61
Discharge: HOME | End: 2021-08-12
Payer: COMMERCIAL

## 2021-08-12 DIAGNOSIS — D3A.012 CARCINOID TUMOR OF ILEUM, UNSPECIFIED WHETHER MALIGNANT: Primary | ICD-10-CM

## 2021-08-12 PROCEDURE — 96372 THER/PROPH/DIAG INJ SC/IM: CPT

## 2021-08-12 RX ADMIN — OCTREOTIDE ACETATE 30 MG: KIT at 10:45

## 2021-08-12 NOTE — PROGRESS NOTES
Pt presents for Sandostatin injection  Pt offers no complaints  Injection placed in R gluteus, tolerated well  AVS declined  Next appointment reviewed

## 2021-08-17 ENCOUNTER — VBI (OUTPATIENT)
Dept: ADMINISTRATIVE | Facility: OTHER | Age: 61
End: 2021-08-17

## 2021-09-09 ENCOUNTER — HOSPITAL ENCOUNTER (OUTPATIENT)
Dept: INFUSION CENTER | Facility: CLINIC | Age: 61
Discharge: HOME | End: 2021-09-09
Payer: COMMERCIAL

## 2021-09-09 VITALS — TEMPERATURE: 96.4 F

## 2021-09-09 DIAGNOSIS — D3A.012 CARCINOID TUMOR OF ILEUM, UNSPECIFIED WHETHER MALIGNANT: Primary | ICD-10-CM

## 2021-09-09 PROCEDURE — 96372 THER/PROPH/DIAG INJ SC/IM: CPT

## 2021-09-09 RX ADMIN — OCTREOTIDE ACETATE 30 MG: KIT at 10:38

## 2021-09-09 NOTE — PROGRESS NOTES
Pt presents for sandostatin injection offering no complaints  Injection administered as ordered in pt's L glute without complication  AVS declined  Pt discharged in stable condition

## 2021-09-22 ENCOUNTER — HOSPITAL ENCOUNTER (OUTPATIENT)
Dept: MAMMOGRAPHY | Facility: CLINIC | Age: 61
Discharge: HOME | End: 2021-09-22
Payer: COMMERCIAL

## 2021-09-22 ENCOUNTER — HOSPITAL ENCOUNTER (OUTPATIENT)
Dept: ULTRASOUND IMAGING | Facility: CLINIC | Age: 61
Discharge: HOME | End: 2021-09-22
Payer: COMMERCIAL

## 2021-09-22 VITALS — WEIGHT: 175 LBS | HEIGHT: 66 IN | BODY MASS INDEX: 28.12 KG/M2

## 2021-09-22 DIAGNOSIS — R92.8 ABNORMAL MAMMOGRAM: ICD-10-CM

## 2021-09-22 PROCEDURE — 77066 DX MAMMO INCL CAD BI: CPT

## 2021-09-22 PROCEDURE — G0279 TOMOSYNTHESIS, MAMMO: HCPCS

## 2021-09-22 PROCEDURE — 76642 ULTRASOUND BREAST LIMITED: CPT

## 2021-09-23 ENCOUNTER — TELEPHONE (OUTPATIENT)
Dept: HEMATOLOGY ONCOLOGY | Facility: CLINIC | Age: 61
End: 2021-09-23

## 2021-09-23 NOTE — TELEPHONE ENCOUNTER
LVM for patient explaining to patient her appt 11/18/21 with Dr Bret Borrero was changed to the morning at 11:00am

## 2021-10-07 ENCOUNTER — HOSPITAL ENCOUNTER (OUTPATIENT)
Dept: INFUSION CENTER | Facility: CLINIC | Age: 61
Discharge: HOME | End: 2021-10-07
Payer: COMMERCIAL

## 2021-10-07 VITALS — TEMPERATURE: 96.9 F

## 2021-10-07 DIAGNOSIS — D3A.012 CARCINOID TUMOR OF ILEUM, UNSPECIFIED WHETHER MALIGNANT: Primary | ICD-10-CM

## 2021-10-07 PROCEDURE — 96372 THER/PROPH/DIAG INJ SC/IM: CPT

## 2021-10-07 RX ADMIN — OCTREOTIDE ACETATE 30 MG: KIT at 11:10

## 2021-11-04 ENCOUNTER — HOSPITAL ENCOUNTER (OUTPATIENT)
Dept: INFUSION CENTER | Facility: CLINIC | Age: 61
Discharge: HOME | End: 2021-11-04
Payer: COMMERCIAL

## 2021-11-04 DIAGNOSIS — D3A.012 CARCINOID TUMOR OF ILEUM, UNSPECIFIED WHETHER MALIGNANT: Primary | ICD-10-CM

## 2021-11-04 PROCEDURE — 96372 THER/PROPH/DIAG INJ SC/IM: CPT

## 2021-11-04 RX ADMIN — OCTREOTIDE ACETATE 30 MG: KIT at 11:18

## 2021-11-08 ENCOUNTER — TELEPHONE (OUTPATIENT)
Dept: HEMATOLOGY ONCOLOGY | Facility: CLINIC | Age: 61
End: 2021-11-08

## 2021-11-08 DIAGNOSIS — D3A.012 CARCINOID TUMOR OF ILEUM, UNSPECIFIED WHETHER MALIGNANT: ICD-10-CM

## 2021-11-08 DIAGNOSIS — C7B.02 METASTATIC MALIGNANT CARCINOID TUMOR TO LIVER (HCC): Primary | ICD-10-CM

## 2021-11-10 ENCOUNTER — APPOINTMENT (OUTPATIENT)
Dept: LAB | Facility: CLINIC | Age: 61
End: 2021-11-10
Payer: COMMERCIAL

## 2021-11-10 DIAGNOSIS — C7B.02 METASTATIC MALIGNANT CARCINOID TUMOR TO LIVER (HCC): ICD-10-CM

## 2021-11-16 ENCOUNTER — HOSPITAL ENCOUNTER (OUTPATIENT)
Dept: RADIOLOGY | Facility: MEDICAL CENTER | Age: 61
Discharge: HOME | End: 2021-11-16
Payer: COMMERCIAL

## 2021-11-16 DIAGNOSIS — D3A.012 CARCINOID TUMOR OF ILEUM, UNSPECIFIED WHETHER MALIGNANT: ICD-10-CM

## 2021-11-16 DIAGNOSIS — C7B.02 METASTATIC MALIGNANT CARCINOID TUMOR TO LIVER (HCC): ICD-10-CM

## 2021-11-16 PROCEDURE — 74177 CT ABD & PELVIS W/CONTRAST: CPT

## 2021-11-16 RX ADMIN — IOHEXOL 100 ML: 350 INJECTION, SOLUTION INTRAVENOUS at 11:45

## 2021-11-18 ENCOUNTER — OFFICE VISIT (OUTPATIENT)
Dept: SURGICAL ONCOLOGY | Facility: CLINIC | Age: 61
End: 2021-11-18
Payer: COMMERCIAL

## 2021-11-18 VITALS
HEIGHT: 66 IN | HEART RATE: 64 BPM | DIASTOLIC BLOOD PRESSURE: 80 MMHG | TEMPERATURE: 98 F | SYSTOLIC BLOOD PRESSURE: 120 MMHG | WEIGHT: 182.5 LBS | RESPIRATION RATE: 14 BRPM | BODY MASS INDEX: 29.33 KG/M2 | OXYGEN SATURATION: 95 %

## 2021-11-18 DIAGNOSIS — C7B.02 METASTATIC MALIGNANT CARCINOID TUMOR TO LIVER (HCC): Primary | ICD-10-CM

## 2021-11-18 PROCEDURE — 99214 OFFICE O/P EST MOD 30 MIN: CPT | Performed by: SURGERY

## 2021-11-18 PROCEDURE — 3008F BODY MASS INDEX DOCD: CPT | Performed by: INTERNAL MEDICINE

## 2021-11-19 ENCOUNTER — TELEMEDICINE (OUTPATIENT)
Dept: HEMATOLOGY ONCOLOGY | Facility: CLINIC | Age: 61
End: 2021-11-19
Payer: COMMERCIAL

## 2021-11-19 ENCOUNTER — TELEPHONE (OUTPATIENT)
Dept: HEMATOLOGY ONCOLOGY | Facility: CLINIC | Age: 61
End: 2021-11-19

## 2021-11-19 DIAGNOSIS — C7B.02 METASTATIC MALIGNANT CARCINOID TUMOR TO LIVER (HCC): Primary | ICD-10-CM

## 2021-11-19 PROCEDURE — 1036F TOBACCO NON-USER: CPT | Performed by: INTERNAL MEDICINE

## 2021-11-19 PROCEDURE — 99214 OFFICE O/P EST MOD 30 MIN: CPT | Performed by: INTERNAL MEDICINE

## 2022-01-30 ENCOUNTER — TELEPHONE (OUTPATIENT)
Dept: HEMATOLOGY ONCOLOGY | Facility: CLINIC | Age: 62
End: 2022-01-30

## 2022-01-30 NOTE — TELEPHONE ENCOUNTER
LVM informing Dong Saunders that her appt on 2/21 w  Dr Wilfrid Guidry needs to be R/S'd due to Dr Wilfrid Guidry being on vacation  I R/S'd her appt to 3/14 at 8am w  TATO Leblanc at 8am at the [facility] location  If Dong Saunders should call back and this appt date, time or location does not work for her schedule, please R/S her to a more accommodating appt date  Please and thank you

## 2022-02-15 ENCOUNTER — TELEPHONE (OUTPATIENT)
Dept: HEMATOLOGY ONCOLOGY | Facility: CLINIC | Age: 62
End: 2022-02-15

## 2022-02-15 NOTE — TELEPHONE ENCOUNTER
Appointment Confirmation (to confirm pre existing appointments - ONLY)     Appointment with  Jet Hurt   Appointment date & time 3-14-22 @ 8:00am    Location [place]    Patient verbilized Understanding

## 2022-03-08 ENCOUNTER — APPOINTMENT (OUTPATIENT)
Dept: LAB | Facility: HOSPITAL | Age: 62
End: 2022-03-08
Payer: COMMERCIAL

## 2022-03-08 ENCOUNTER — TELEPHONE (OUTPATIENT)
Dept: HEMATOLOGY ONCOLOGY | Facility: CLINIC | Age: 62
End: 2022-03-08

## 2022-03-08 DIAGNOSIS — C7B.02 METASTATIC MALIGNANT CARCINOID TUMOR TO LIVER (HCC): ICD-10-CM

## 2022-03-08 LAB
ALBUMIN SERPL BCP-MCNC: 3.9 G/DL (ref 3.5–5)
ALP SERPL-CCNC: 68 U/L (ref 46–116)
ALT SERPL W P-5'-P-CCNC: 71 U/L (ref 12–78)
ANION GAP SERPL CALCULATED.3IONS-SCNC: 9 MMOL/L (ref 4–13)
AST SERPL W P-5'-P-CCNC: 33 U/L (ref 5–45)
BASOPHILS # BLD AUTO: 0.06 THOUSANDS/ΜL (ref 0–0.1)
BASOPHILS NFR BLD AUTO: 1 % (ref 0–1)
BILIRUB SERPL-MCNC: 0.47 MG/DL (ref 0.2–1)
BUN SERPL-MCNC: 15 MG/DL (ref 5–25)
CALCIUM SERPL-MCNC: 9.3 MG/DL (ref 8.3–10.1)
CHLORIDE SERPL-SCNC: 106 MMOL/L (ref 100–108)
CO2 SERPL-SCNC: 27 MMOL/L (ref 21–32)
CREAT SERPL-MCNC: 0.74 MG/DL (ref 0.6–1.3)
EOSINOPHIL # BLD AUTO: 0.23 THOUSAND/ΜL (ref 0–0.61)
EOSINOPHIL NFR BLD AUTO: 3 % (ref 0–6)
ERYTHROCYTE [DISTWIDTH] IN BLOOD BY AUTOMATED COUNT: 11.9 % (ref 11.6–15.1)
GFR SERPL CREATININE-BSD FRML MDRD: 88 ML/MIN/1.73SQ M
GLUCOSE SERPL-MCNC: 96 MG/DL (ref 65–140)
HCT VFR BLD AUTO: 38.3 % (ref 34.8–46.1)
HGB BLD-MCNC: 13.2 G/DL (ref 11.5–15.4)
IMM GRANULOCYTES # BLD AUTO: 0.02 THOUSAND/UL (ref 0–0.2)
IMM GRANULOCYTES NFR BLD AUTO: 0 % (ref 0–2)
LYMPHOCYTES # BLD AUTO: 1.86 THOUSANDS/ΜL (ref 0.6–4.47)
LYMPHOCYTES NFR BLD AUTO: 27 % (ref 14–44)
MCH RBC QN AUTO: 31.3 PG (ref 26.8–34.3)
MCHC RBC AUTO-ENTMCNC: 34.5 G/DL (ref 31.4–37.4)
MCV RBC AUTO: 91 FL (ref 82–98)
MONOCYTES # BLD AUTO: 0.38 THOUSAND/ΜL (ref 0.17–1.22)
MONOCYTES NFR BLD AUTO: 6 % (ref 4–12)
NEUTROPHILS # BLD AUTO: 4.36 THOUSANDS/ΜL (ref 1.85–7.62)
NEUTS SEG NFR BLD AUTO: 63 % (ref 43–75)
NRBC BLD AUTO-RTO: 0 /100 WBCS
PLATELET # BLD AUTO: 259 THOUSANDS/UL (ref 149–390)
PMV BLD AUTO: 11.2 FL (ref 8.9–12.7)
POTASSIUM SERPL-SCNC: 3.9 MMOL/L (ref 3.5–5.3)
PROT SERPL-MCNC: 7.4 G/DL (ref 6.4–8.2)
RBC # BLD AUTO: 4.22 MILLION/UL (ref 3.81–5.12)
SODIUM SERPL-SCNC: 142 MMOL/L (ref 136–145)
WBC # BLD AUTO: 6.91 THOUSAND/UL (ref 4.31–10.16)

## 2022-03-08 PROCEDURE — 80053 COMPREHEN METABOLIC PANEL: CPT

## 2022-03-08 PROCEDURE — 86316 IMMUNOASSAY TUMOR OTHER: CPT

## 2022-03-08 PROCEDURE — 85025 COMPLETE CBC W/AUTO DIFF WBC: CPT

## 2022-03-08 PROCEDURE — 36415 COLL VENOUS BLD VENIPUNCTURE: CPT

## 2022-03-08 NOTE — TELEPHONE ENCOUNTER
Appointment Confirmation (to confirm pre existing appointments - ONLY)     Appointment with Nereida Purvis   Appointment date & time 3/14/2022 @8AM   Location [facility]   Patient verbilized Understanding Yes

## 2022-03-10 LAB — CGA SERPL-MCNC: 42 NG/ML (ref 0–101.8)

## 2022-03-13 NOTE — PROGRESS NOTES
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 [facility])  This is a 20-year-old female who previously saw Dr Kyle Allen regarding the above  The following history was taken from his last note  This patient a history of ongoing diarrhea for 2 years  Colonoscopy showed in  polyps  Pathology showed GI carcinoid tumor  K 67 less than 3%  Low to intermediate grade  PET scan showed intra-abdominal lymphadenopathy and a liver lesion  2019, she is status post right hemicolectomy, cholecystectomy  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); Found Lymphovascular and perineural invasion are present;   T3N1M1  G1;  3/24 LN  She is status post intraoperative liver lesion ablation at that time as well  She was placed on Sandostatin once monthly from 10/2019 to 2021  Per Dr Kyle Allen last note, it was decided to stop treatment and place patient on observation based off of Promid trial     Patient has CT scan scheduled for 2022  CBC, CMP unremarkable  Chromogranin a however was mildly elevated at 42 from 18 2021  Clinically, patient states her diarrhea is stable  She has no constitutional symptoms, flushing, wheezing  We will recheck labs again in about 1 month  If her chromogranin continues to increase, we may consider pushing up CT scan  She will follow-up end of May with Dr Silviano Jordan      - CBC and differential; Future  - Comprehensive metabolic panel; Future  - Chromogranin A; Future    Patient voiced understanding and agreement to the above  The patient knows to call the office with any questions or concerns regarding the above      I have spent 30 minutes with Patient  today in which greater than 50% of this time was spent in counseling/coordination of care regarding Diagnostic results, Intructions for management, Patient and family education, Importance of tx compliance, Risk factor reductions and Impressions  Reason for visit:       Chief Complaint   Patient presents with    Follow-up       History of Hematology / Oncology Illness:     Chema Sheriff is a 62 y o  female who came in to follow up  - Previously seen by Dr Conway Side    1  GI carcinoid tumor, T3N1M1  G1  - presented with diarrhea ongoing for 2 years  Colonoscopy showed polyps, biopsy showed GI carcinoid tumor     - Ki 67 less than 3 percent  Low to intermediate grade     -  PET-CT showed intra-abdominal lymphadenopathy and liver leison  Cancer marker all normal before surgery     - 8/22/2019 : s/p Right hemicolectomy, cholecystectomy, 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); Found Lymphovascular and perineural invasion are present;   T3N1M1  G1;  3/24 LN  - 8 / 22/2019 :  Intraoperative  liver lesion ablation  - 4/2021 : CT C/a/P Showed questionable liver lesion  MRI showed liver lesion is stable  - 7/2021:  CT chest abdomen pelvic showed stable liver lesion  Status post hernia surgery in 07/19/2021  - 11/2021: CT abd/pelvis shows stable hepatic lesions  Patient saw Dr Omi Bella afterwards and Sandostatin Q monthly was stopped  She was on this - 11/2021  Now on observation       Oncology History   Carcinoid tumor of ileum   6/7/2019 Initial Diagnosis    Carcinoid tumor of ileum     8/22/2019 Surgery    Right hemicolectomy, cholecystectomy  - 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)  - Lymphovascular and perineural invasion are present  T3N1M1  G1  3/24 LN         Cancer Staging  Carcinoid tumor of ileum  Staging form: Neuroendocrine Tumor - Jejunum/Ileum, AJCC 8th Edition  - Pathologic stage from 8/22/2019: Stage IV (pT3, pN1, cM1) - Unsigned  Histologic grade (G): G1  Histologic grading system: 3 grade system  Sites of metastasis: Liver  Lymph-vascular invasion (LVI): BOTH lymphatic and small vessel AND venous (large vessel) invasion      ECO - Asymptomatic    Interval History:   " 2019 :  Patient is a 60-year-old female, with history of depression, chronic diarrhea, recently diagnosed GI neuroendocrine tumor  [facility]      Patient reported other than diarrhea, she has no flushing, no dyspnea, no abdominal pain  She has no other malignancies in the past, her diarrhea is about 3 to 4 times a day watery usually after meal   She has been evaluated by GI, her diarrhea is considered due to combination of IBS and carcinoid tumor      Patient is a nonsmoker, drinks alcohol socially         2019 :  Patient came in for follow-up  Reported after surgery has recovered very well, bowel movement back to normal   No fatigue, no sweating no flushing         2019 :  Came for follow-up  Reported doing okay, has some very mild intermittent right upper quadrant abdominal pain however normal skin color no nausea vomiting no diarrhea  Tolerating injection, no other issues      2020 :  Came for follow-up, overall doing well  Patient with recently diagnosed COVID with her  as well  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  Recovered well, never need hospitalization  Has some intermittent diarrhea, nothing concerning  No skin color change no abdominal pain distension     2020 :  Patient came in for follow-up doing well, has some mild diarrhea, otherwise no major issues  Body weight stable, no flushing       2020 :  Patient came in for follow-up  Subjectively doing okay  Mild diarrhea however not very severe and not like the initial symptoms when cancer was diagnosed  Body weight stable; no flushing     2021 : Patient came in for follow-up, subjective patient doing well  No abdominal pain blood shin    Bowel movement habits remains the same        2021:  Patient came for follow-up doing okay body weight stable  No change of bowel habits  Recently had hiatal hernia surgery went well no major issues afterwards  "    3/14/2022: Patient came in for follow-up  She states that her diarrhea is chronic and has not changed  She has no new constitutional symptoms  No flushing, wheezing  She is happy to be off of treatment and on observation  Problem list:       Patient Active Problem List   Diagnosis    Chronic bilateral low back pain    Depression    Scoliosis of thoracic spine    Carcinoid tumor of ileum    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 ([address] )    Other long term (current) drug therapy    Non-recurrent abdominal hernia without obstruction or gangrene       REVIEW OF SYMPTOMS:   Review of Systems   Constitutional: Negative for activity change, appetite change, fatigue and unexpected weight change  HENT: Negative for nosebleeds  Eyes: Negative for visual disturbance  Respiratory: Negative for cough and shortness of breath  Cardiovascular: Negative for chest pain, palpitations and leg swelling  Gastrointestinal: Positive for diarrhea (Chronic diarrhea which has not changed per patient  She takes Imodium as needed  )  Negative for abdominal pain, anal bleeding, blood in stool, constipation, nausea and vomiting  Endocrine: Negative for cold intolerance  Genitourinary: Negative for hematuria, menstrual problem and vaginal bleeding  Musculoskeletal: Negative for arthralgias  Skin: Negative for color change, pallor and rash  Neurological: Negative for dizziness, syncope, light-headedness and headaches  Hematological: Negative for adenopathy  Does not bruise/bleed easily  Psychiatric/Behavioral: Negative for sleep disturbance         PHYSICAL EXAMINATION:     Vital Signs:   /78 (BP Location: Left arm, Patient Position: Sitting, Cuff Size: Adult)   Pulse 64   Temp (!) 97 4 °F (36 3 °C)   Resp 18   Ht 5' 6" (1 676 m)   Wt 81 2 kg (179 lb)   SpO2 97% BMI 28 89 kg/m²   Body surface area is 1 91 meters squared  Ht Readings from Last 3 Encounters:   03/14/22 5' 6" (1 676 m)   11/18/21 5' 6" (1 676 m)   09/22/21 5' 6" (1 676 m)       Wt Readings from Last 3 Encounters:   03/14/22 81 2 kg (179 lb)   11/18/21 82 8 kg (182 lb 8 oz)   09/22/21 79 4 kg (175 lb)          Physical Exam  Constitutional:       General: She is not in acute distress  Appearance: Normal appearance  She is not ill-appearing, toxic-appearing or diaphoretic  HENT:      Head: Normocephalic and atraumatic  Eyes:      General: No scleral icterus  Extraocular Movements: Extraocular movements intact  Conjunctiva/sclera: Conjunctivae normal       Pupils: Pupils are equal, round, and reactive to light  Cardiovascular:      Rate and Rhythm: Normal rate and regular rhythm  Heart sounds: Normal heart sounds  No murmur heard  Pulmonary:      Effort: Pulmonary effort is normal  No respiratory distress  Breath sounds: Normal breath sounds  No stridor  No wheezing, rhonchi or rales  Abdominal:      Palpations: Abdomen is soft  Tenderness: There is no abdominal tenderness  Musculoskeletal:         General: No tenderness  Normal range of motion  Cervical back: Normal range of motion and neck supple  Right lower leg: No edema  Left lower leg: No edema  Lymphadenopathy:      Cervical: No cervical adenopathy  Skin:     General: Skin is warm and dry  Coloration: Skin is not jaundiced or pale  Findings: No bruising, erythema, lesion or rash  Neurological:      General: No focal deficit present  Mental Status: She is alert and oriented to person, place, and time  Mental status is at baseline  Cranial Nerves: No cranial nerve deficit  Motor: No weakness  Psychiatric:         Mood and Affect: Mood normal          Behavior: Behavior normal          Thought Content:  Thought content normal          Judgment: Judgment normal  Reviewed historical information          PAST MEDICAL HISTORY:    Past Medical History:   Diagnosis Date    Cancer [facility]) 2019    neuroendocrine tumor    Psychiatric disorder     Scoliosis     Strep throat     Weight gain        PAST SURGICAL HISTORY:    Past Surgical History:   Procedure Laterality Date    BREAST BIOPSY Right 2016    benign    BREAST BIOPSY Right 2021    benign    BREAST CYST EXCISION Right     in 30's-benign    CHOLECYSTECTOMY N/A 8/22/2019    Procedure: CHOLECYSTECTOMY;  Surgeon: Dunia Grimes MD;  [facility];  Service: Surgical Oncology    COLECTOMY TOTAL Right 8/22/2019    Procedure: HEMICOLECTOMY;  Surgeon: Dunia Grimes MD;  [facility];  Service: Surgical Oncology    HYSTERECTOMY  2010    LAPAROTOMY N/A 8/22/2019    Procedure: LAPAROTOMY EXPLORATORY;  Surgeon: Dunia Grimes MD;  [facility];  Service: Surgical Oncology    PARTIAL HYSTERECTOMY      AZ ABLTJ 1/> LVR BRYNN PRQ RF N/A 8/22/2019    Procedure: ABLATION MICROWAVE; INTRAOPERATIVE ULTRASOUND OF THE LIVER;  Surgeon: Dunia Grimes MD;  [facility];  Service: Surgical Oncology    AZ REPAIR INCISIONAL HERNIA,REDUCIBLE N/A 7/19/2021    Procedure: INCISIONAL HERNIA REPAIR;  Surgeon: Antonella Martinez DO;  [facility];  Service: General    TUBAL LIGATION      US GUIDED BREAST BIOPSY RIGHT COMPLETE Right 3/19/2021         CURRENT MEDICATIONS:     Current Outpatient Medications:     Brexpiprazole (REXULTI) 0 5 MG tablet, Take 0 5 mg by mouth daily at bedtime , Disp: , Rfl:     citalopram (CeleXA) 20 mg tablet, Take 1 tablet (20 mg total) by mouth daily (Patient taking differently: Take 20 mg by mouth daily at bedtime ), Disp: 30 tablet, Rfl: 2    octreotide (SandoSTATIN LAR DEPOT) 10 mg IM injection kit, Inject 30 mg into a muscle every 28 days  (Patient not taking: Reported on 3/14/2022 ), Disp: , Rfl:     Family History   Social History     Tobacco Use    Smoking status: Never Smoker    Smokeless tobacco: Never Used   Vaping Use    Vaping Use: Never used   Substance Use Topics    Alcohol use: Not Currently    Drug use: Not Currently    Relation Age of Onset     Mother 76     Father 68     Maternal Grandmother      Paternal Grandmother      Maternal Aunt      Pa  Family History   Problem Relation Age of Onset    Thyroid cancer Mother 76    Multiple myeloma Father 68    No Known Problems Maternal Grandmother     No Known Problems Paternal Grandmother     No Known Problems Maternal Aunt     No Known Problems Paternal Aunt     No Known Problems Paternal Aunt     No Known Problems Paternal Aunt     Breast cancer Other         age unknown    No Known Problems Brother    ternal Aunt     No Known Problems Paternal Aunt     No Known Problems Paternal Aunt     Breast cancer Other         age unknown    No Known Problems Brother       Allergen Reactions    Prednisone Other (See Comments) and Confusion     Psychosis        Lab Re  Lab Results   Component Value Date    WBC 6 91 03/08/2022    HGB 13 2 03/08/2022    HCT 38 3 03/08/2022    MCV 91 03/08/2022     03/08/2022   sults   Component Value Date    WBC 6 91 03/08/2022    HGB 13 2 03/08/2022    HCT 38 3 03/08/2022    MCV 91 03/08/2022     03/08/2022      Component Value Date    SODIUM 142 03/08/2022    K 3 9 03/08/2022     03/08/2022    CO2 27 03/08/2022    AGAP 9 03/08/2022    BUN 15 03/08/2022    CREATININE 0 74 03/08/2022    GLUC 96 03/08/2022    GLUF 123 (H) 07/26/2021    CALCIUM 9 3 03/08/2022    AST 33 03/08/2022    ALT 71 03/08/2022    ALKPHOS 68 03/08/2022    TP 7 4 03/08/2022    TBILI 0 47 03/08/2022    EGFR 88 03/08/2022       IMAGING:  CT abdomen pelvis w contrast  Narrative: CT ABDOMEN AND PELVIS WITH IV CONTRAST    INDICATION:   C7B 02: Secondary carcinoid tumors of liver  D3A 012: Benign carcinoid tumor of the ileum     "metatatic malignant cacinoid tumor to liver, carcinoid tumor of ileum, hx small bowel resection, 4 liver lesions removed, restaging"     COMPARISON:  CT chest abdomen pelvis 7/26/2021  MRI abdomen 5/3/2021  TECHNIQUE:  CT examination of the abdomen and pelvis was performed  Axial, sagittal, and coronal 2D reformatted images were created from the source data and submitted for interpretation  Radiation dose length product (DLP) for this visit:  699 mGy-cm   This examination, like all CT scans performed in the [facility], was performed utilizing techniques to minimize radiation dose exposure, including the use of iterative   reconstruction and automated exposure control  IV Contrast:  100 mL of iohexol (OMNIPAQUE)  Enteric Contrast:  Enteric contrast was not administered  FINDINGS:    ABDOMEN    LOWER CHEST:  No clinically significant abnormality identified in the visualized lower chest     LIVER/BILIARY TREE:  Stable hepatic lesions: Unchanged segment 7 7 mm lesion #2/19  Unchanged 23 x 16 mm segment 8 lesion #2/23  Unchanged 15 x 14 mm segment 7 subcapsular lesion #2/29  Unchanged exophytic segment 6 lesion measuring 16 x 15 mm #2/40  No new hepatic findings  Unchanged hepatomegaly and hepatic steatosis  GALLBLADDER:  Gallbladder is surgically absent  SPLEEN:  Unremarkable  PANCREAS:  Unremarkable  ADRENAL GLANDS:  Unremarkable  KIDNEYS/URETERS:  Unremarkable  No hydronephrosis  STOMACH AND BOWEL:  No new findings  Colonic diverticulosis without diverticulitis  Status post right hemicolectomy  No apparent anastomotic complication  APPENDIX:  Surgically absent  ABDOMINOPELVIC CAVITY:  No ascites  No pneumoperitoneum  No lymphadenopathy  VESSELS:  Unremarkable for patient's age  PELVIS    REPRODUCTIVE ORGANS:  Unremarkable for patient's age  URINARY BLADDER:  Unremarkable  ABDOMINAL WALL/INGUINAL REGIONS:  Status post ventral wall hernia repair      OSSEOUS STRUCTURES:  Moderate scoliosis unchanged from the prior studies  Impression: Stable hepatic lesions      Workstation performed: [id number]

## 2022-03-14 ENCOUNTER — OFFICE VISIT (OUTPATIENT)
Dept: HEMATOLOGY ONCOLOGY | Facility: CLINIC | Age: 62
End: 2022-03-14
Payer: COMMERCIAL

## 2022-03-14 VITALS
TEMPERATURE: 97.4 F | OXYGEN SATURATION: 97 % | SYSTOLIC BLOOD PRESSURE: 122 MMHG | DIASTOLIC BLOOD PRESSURE: 78 MMHG | BODY MASS INDEX: 28.77 KG/M2 | RESPIRATION RATE: 18 BRPM | WEIGHT: 179 LBS | HEIGHT: 66 IN | HEART RATE: 64 BPM

## 2022-03-14 DIAGNOSIS — K52.9 CHRONIC DIARRHEA: ICD-10-CM

## 2022-03-14 DIAGNOSIS — C7B.02 METASTATIC MALIGNANT CARCINOID TUMOR TO LIVER (HCC): Primary | ICD-10-CM

## 2022-03-14 PROCEDURE — 3008F BODY MASS INDEX DOCD: CPT | Performed by: INTERNAL MEDICINE

## 2022-03-14 PROCEDURE — 1036F TOBACCO NON-USER: CPT | Performed by: INTERNAL MEDICINE

## 2022-03-14 PROCEDURE — 99214 OFFICE O/P EST MOD 30 MIN: CPT | Performed by: INTERNAL MEDICINE

## 2022-03-22 ENCOUNTER — HOSPITAL ENCOUNTER (OUTPATIENT)
Dept: MAMMOGRAPHY | Facility: CLINIC | Age: 62
Discharge: HOME | End: 2022-03-22
Payer: COMMERCIAL

## 2022-03-22 DIAGNOSIS — R92.8 MAMMOGRAM ABNORMAL: ICD-10-CM

## 2022-03-22 PROCEDURE — 77065 DX MAMMO INCL CAD UNI: CPT

## 2022-03-22 PROCEDURE — G0279 TOMOSYNTHESIS, MAMMO: HCPCS

## 2022-04-07 ENCOUNTER — HOSPITAL ENCOUNTER (OUTPATIENT)
Dept: MRI IMAGING | Facility: HOSPITAL | Age: 62
Discharge: HOME | End: 2022-04-07
Payer: COMMERCIAL

## 2022-04-07 VITALS — WEIGHT: 172 LBS | BODY MASS INDEX: 27.64 KG/M2 | HEIGHT: 66 IN

## 2022-04-07 DIAGNOSIS — R92.8 ABNORMAL MAMMOGRAM: ICD-10-CM

## 2022-04-07 PROCEDURE — C8937 CAD BREAST MRI: HCPCS

## 2022-04-07 PROCEDURE — C8908 MRI W/O FOL W/CONT, BREAST,: HCPCS

## 2022-04-07 PROCEDURE — A9585 GADOBUTROL INJECTION: HCPCS | Performed by: FAMILY MEDICINE

## 2022-04-07 PROCEDURE — G1004 CDSM NDSC: HCPCS

## 2022-04-07 RX ADMIN — GADOBUTROL 7 ML: 604.72 INJECTION INTRAVENOUS at 08:18

## 2022-04-14 ENCOUNTER — APPOINTMENT (OUTPATIENT)
Dept: LAB | Facility: HOSPITAL | Age: 62
End: 2022-04-14
Payer: COMMERCIAL

## 2022-04-14 DIAGNOSIS — C7B.02 METASTATIC MALIGNANT CARCINOID TUMOR TO LIVER (HCC): ICD-10-CM

## 2022-04-14 LAB
ALBUMIN SERPL BCP-MCNC: 4 G/DL (ref 3.5–5)
ALP SERPL-CCNC: 63 U/L (ref 46–116)
ALT SERPL W P-5'-P-CCNC: 40 U/L (ref 12–78)
ANION GAP SERPL CALCULATED.3IONS-SCNC: 10 MMOL/L (ref 4–13)
AST SERPL W P-5'-P-CCNC: 19 U/L (ref 5–45)
BASOPHILS # BLD AUTO: 0.06 THOUSANDS/ΜL (ref 0–0.1)
BASOPHILS NFR BLD AUTO: 1 % (ref 0–1)
BILIRUB SERPL-MCNC: 0.55 MG/DL (ref 0.2–1)
BUN SERPL-MCNC: 17 MG/DL (ref 5–25)
CALCIUM SERPL-MCNC: 9.6 MG/DL (ref 8.3–10.1)
CHLORIDE SERPL-SCNC: 104 MMOL/L (ref 100–108)
CO2 SERPL-SCNC: 24 MMOL/L (ref 21–32)
CREAT SERPL-MCNC: 0.82 MG/DL (ref 0.6–1.3)
EOSINOPHIL # BLD AUTO: 0.22 THOUSAND/ΜL (ref 0–0.61)
EOSINOPHIL NFR BLD AUTO: 3 % (ref 0–6)
ERYTHROCYTE [DISTWIDTH] IN BLOOD BY AUTOMATED COUNT: 11.4 % (ref 11.6–15.1)
GFR SERPL CREATININE-BSD FRML MDRD: 78 ML/MIN/1.73SQ M
GLUCOSE P FAST SERPL-MCNC: 103 MG/DL (ref 65–99)
HCT VFR BLD AUTO: 38.2 % (ref 34.8–46.1)
HGB BLD-MCNC: 13.3 G/DL (ref 11.5–15.4)
IMM GRANULOCYTES # BLD AUTO: 0.02 THOUSAND/UL (ref 0–0.2)
IMM GRANULOCYTES NFR BLD AUTO: 0 % (ref 0–2)
LYMPHOCYTES # BLD AUTO: 1.7 THOUSANDS/ΜL (ref 0.6–4.47)
LYMPHOCYTES NFR BLD AUTO: 22 % (ref 14–44)
MCH RBC QN AUTO: 31.7 PG (ref 26.8–34.3)
MCHC RBC AUTO-ENTMCNC: 34.8 G/DL (ref 31.4–37.4)
MCV RBC AUTO: 91 FL (ref 82–98)
MONOCYTES # BLD AUTO: 0.39 THOUSAND/ΜL (ref 0.17–1.22)
MONOCYTES NFR BLD AUTO: 5 % (ref 4–12)
NEUTROPHILS # BLD AUTO: 5.34 THOUSANDS/ΜL (ref 1.85–7.62)
NEUTS SEG NFR BLD AUTO: 69 % (ref 43–75)
NRBC BLD AUTO-RTO: 0 /100 WBCS
PLATELET # BLD AUTO: 276 THOUSANDS/UL (ref 149–390)
PMV BLD AUTO: 10.3 FL (ref 8.9–12.7)
POTASSIUM SERPL-SCNC: 4.1 MMOL/L (ref 3.5–5.3)
PROT SERPL-MCNC: 7.6 G/DL (ref 6.4–8.2)
RBC # BLD AUTO: 4.2 MILLION/UL (ref 3.81–5.12)
SODIUM SERPL-SCNC: 138 MMOL/L (ref 136–145)
WBC # BLD AUTO: 7.73 THOUSAND/UL (ref 4.31–10.16)

## 2022-04-14 PROCEDURE — 85025 COMPLETE CBC W/AUTO DIFF WBC: CPT

## 2022-04-14 PROCEDURE — 86316 IMMUNOASSAY TUMOR OTHER: CPT

## 2022-04-14 PROCEDURE — 80053 COMPREHEN METABOLIC PANEL: CPT

## 2022-04-14 PROCEDURE — 36415 COLL VENOUS BLD VENIPUNCTURE: CPT

## 2022-04-15 ENCOUNTER — TELEPHONE (OUTPATIENT)
Dept: HEMATOLOGY ONCOLOGY | Facility: CLINIC | Age: 62
End: 2022-04-15

## 2022-04-19 LAB — CGA SERPL-MCNC: 29.5 NG/ML (ref 0–101.8)

## 2022-05-05 ENCOUNTER — TELEPHONE (OUTPATIENT)
Dept: OTHER | Facility: OTHER | Age: 62
End: 2022-05-05

## 2022-05-05 NOTE — TELEPHONE ENCOUNTER
Andrés Clark called in stating she needs any records from the surgery date until the present faxed to [phone]

## 2022-05-11 ENCOUNTER — HOSPITAL ENCOUNTER (OUTPATIENT)
Dept: ULTRASOUND IMAGING | Facility: HOSPITAL | Age: 62
Discharge: HOME | End: 2022-05-11
Payer: OTHER MISCELLANEOUS

## 2022-05-11 DIAGNOSIS — R10.13 ABDOMINAL PAIN, EPIGASTRIC: ICD-10-CM

## 2022-05-11 PROCEDURE — 76705 ECHO EXAM OF ABDOMEN: CPT

## 2022-05-18 ENCOUNTER — OFFICE VISIT (OUTPATIENT)
Dept: FAMILY MEDICINE CLINIC | Facility: CLINIC | Age: 62
End: 2022-05-18
Payer: COMMERCIAL

## 2022-05-18 VITALS
DIASTOLIC BLOOD PRESSURE: 84 MMHG | WEIGHT: 174.2 LBS | SYSTOLIC BLOOD PRESSURE: 122 MMHG | BODY MASS INDEX: 28 KG/M2 | TEMPERATURE: 97.9 F | HEART RATE: 76 BPM | HEIGHT: 66 IN | OXYGEN SATURATION: 95 %

## 2022-05-18 DIAGNOSIS — J01.10 ACUTE NON-RECURRENT FRONTAL SINUSITIS: Primary | ICD-10-CM

## 2022-05-18 DIAGNOSIS — Z23 NEED FOR DIPHTHERIA-TETANUS-PERTUSSIS (TDAP) VACCINE: ICD-10-CM

## 2022-05-18 DIAGNOSIS — J06.9 URI WITH COUGH AND CONGESTION: ICD-10-CM

## 2022-05-18 LAB
SARS-COV-2 AG UPPER RESP QL IA: NEGATIVE
VALID CONTROL: NORMAL

## 2022-05-18 PROCEDURE — 99213 OFFICE O/P EST LOW 20 MIN: CPT | Performed by: FAMILY MEDICINE

## 2022-05-18 PROCEDURE — 87811 SARS-COV-2 COVID19 W/OPTIC: CPT | Performed by: FAMILY MEDICINE

## 2022-05-18 PROCEDURE — 90715 TDAP VACCINE 7 YRS/> IM: CPT

## 2022-05-18 PROCEDURE — 90471 IMMUNIZATION ADMIN: CPT

## 2022-05-18 RX ORDER — AMOXICILLIN 875 MG/1
875 TABLET, COATED ORAL 2 TIMES DAILY
Qty: 14 TABLET | Refills: 0 | Status: SHIPPED | OUTPATIENT
Start: 2022-05-18 | End: 2022-05-25

## 2022-05-18 NOTE — PROGRESS NOTES
Mihir Baumann 1960 female MRN: [id number]    Acute Visit        ASSESSMENT/PLAN  Problem List Items Addressed This Visit    None     Visit Diagnoses     Acute non-recurrent frontal sinusitis    -  Primary    Relevant Medications    amoxicillin (AMOXIL) 875 mg tablet    URI with cough and congestion        Relevant Medications    amoxicillin (AMOXIL) 875 mg tablet    Other Relevant Orders    POCT Rapid Covid Ag (Completed)    Need for diphtheria-tetanus-pertussis (Tdap) vaccine        Relevant Orders    TDAP VACCINE GREATER THAN OR EQUAL TO 6YO IM (Completed)                Future Appointments   Date Time Provider Lucero Arias   5/19/2022 10:30 AM MO CT 1 MO CT [facility]   5/23/2022  9:00 AM Gadiel Dai MD HEM ONC STR [facility]   9/26/2022  1:00 PM MO MAMMO RBC 1 MO RBC Mammo MO RBC        SUBJECTIVE  CC: Sore Throat and Sinus Problem       Cough, congestion, post nasal drip, ear pain, sinus pressure for a week  Swollen glands  Not improving  Tried Mucinex which made her cough more  COVID in office negative    Sore Throat   Associated symptoms include congestion, coughing and ear pain  Sinus Problem  Associated symptoms include congestion, coughing, ear pain and a sore throat  Pertinent negatives include no chills  Mihir Baumann is a 62 y o  female who presented for an acute visit complaining of  Review of Systems   Constitutional: Negative for chills and fever  HENT: Positive for congestion, ear pain and sore throat  Respiratory: Positive for cough          Historical Information   The patient history was reviewed as follows:  Past Medical History:   Diagnosis Date    Cancer ([facility] [address] ) 2019    neuroendocrine tumor    Psychiatric disorder     Scoliosis     Strep throat     Weight gain      Past Surgical History:   Procedure Laterality Date    BREAST BIOPSY Right 2016    benign    BREAST BIOPSY Right 2021    benign    BREAST CYST EXCISION Right     in 30's-benign    CHOLECYSTECTOMY N/A 8/22/2019    Procedure: CHOLECYSTECTOMY;  Surgeon: Papa Magana MD;  [facility];  Service: Surgical Oncology    COLECTOMY TOTAL Right 8/22/2019    Procedure: HEMICOLECTOMY;  Surgeon: Papa Magana MD;  [facility];  Service: Surgical Oncology    HYSTERECTOMY  2010    LAPAROTOMY N/A 8/22/2019    Procedure: LAPAROTOMY EXPLORATORY;  Surgeon: Papa Magana MD;  [facility];  Service: Surgical Oncology    PARTIAL HYSTERECTOMY      NH ABLTJ 1/> LVR BRYNN PRQ RF N/A 8/22/2019    Procedure: ABLATION MICROWAVE; INTRAOPERATIVE ULTRASOUND OF THE LIVER;  Surgeon: Papa Magana MD;  [facility];  Service: Surgical Oncology    NH REPAIR INCISIONAL HERNIA,REDUCIBLE N/A 7/19/2021    Procedure: INCISIONAL HERNIA REPAIR;  Surgeon: Mai Tong DO;  [facility];  Service: General    TUBAL LIGATION      US GUIDED BREAST BIOPSY RIGHT COMPLETE Right 3/19/2021     Family History   Problem Relation Age of Onset    Thyroid cancer Mother 76    Multiple myeloma Father 68    No Known Problems Maternal Grandmother     No Known Problems Paternal Grandmother     No Known Problems Maternal Aunt     No Known Problems Paternal Aunt     No Known Problems Paternal Aunt     No Known Problems Paternal Aunt     Breast cancer Other         age unknown    No Known Problems Brother       Social History   Social History     Substance and Sexual Activity   Alcohol Use Not Currently     Social History     Substance and Sexual Activity   Drug Use Not Currently     Social History     Tobacco Use   Smoking Status Never Smoker   Smokeless Tobacco Never Used       Medications:   Meds/Allergies   Current Outpatient Medications   Medication Sig Dispense Refill    amoxicillin (AMOXIL) 875 mg tablet Take 1 tablet (875 mg total) by mouth in the morning and 1 tablet (875 mg total) in the evening  Do all this for 7 days   14 tablet 0    Brexpiprazole (REXULTI) 0 5 MG tablet Take 0 5 mg by mouth daily at bedtime       citalopram (CeleXA) 20 mg tablet Take 1 tablet (20 mg total) by mouth daily (Patient taking differently: Take 20 mg by mouth daily at bedtime) 30 tablet 2     No current facility-administered medications for this visit  Allergies   Allergen Reactions    Prednisone Other (See Comments) and Confusion     Psychosis        OBJECTIVE  Vitals:   Vitals:    05/18/22 0841   BP: 122/84   BP Location: Left arm   Patient Position: Sitting   Pulse: 76   Temp: 97 9 °F (36 6 °C)   TempSrc: Temporal   SpO2: 95%   Weight: 79 kg (174 lb 3 2 oz)   Height: 5' 6" (1 676 m)       Invasive Devices  Report    Peripheral Intravenous Line  Duration           Peripheral IV 07/26/21 Left Antecubital 296 days                Physical Exam  Vitals and nursing note reviewed  Constitutional:       General: She is not in acute distress  Appearance: She is well-developed  HENT:      Right Ear: Hearing, tympanic membrane, ear canal and external ear normal       Left Ear: Hearing, tympanic membrane, ear canal and external ear normal       Nose:      Right Sinus: Maxillary sinus tenderness present  Left Sinus: Maxillary sinus tenderness present  Mouth/Throat:      Pharynx: Uvula midline  No oropharyngeal exudate or posterior oropharyngeal erythema  Eyes:      Conjunctiva/sclera: Conjunctivae normal       Pupils: Pupils are equal, round, and reactive to light  Cardiovascular:      Rate and Rhythm: Normal rate  Heart sounds: Normal heart sounds  No murmur heard  No friction rub  No gallop  Pulmonary:      Effort: Pulmonary effort is normal  No respiratory distress  Breath sounds: Normal breath sounds  No wheezing or rales  Lymphadenopathy:      Cervical: No cervical adenopathy  Skin:     General: Skin is warm  Findings: No rash  Neurological:      Mental Status: She is alert and oriented to person, place, and time     Psychiatric:         Behavior: Behavior normal          Thought Content: Thought content normal          Judgment: Judgment normal           Lab:  I have personally reviewed all pertinent results

## 2022-05-19 ENCOUNTER — HOSPITAL ENCOUNTER (OUTPATIENT)
Dept: CT IMAGING | Facility: HOSPITAL | Age: 62
Discharge: HOME | End: 2022-05-19
Attending: SURGERY
Payer: COMMERCIAL

## 2022-05-19 DIAGNOSIS — C7B.02 METASTATIC MALIGNANT CARCINOID TUMOR TO LIVER (HCC): ICD-10-CM

## 2022-05-19 PROCEDURE — G1004 CDSM NDSC: HCPCS

## 2022-05-19 PROCEDURE — 74177 CT ABD & PELVIS W/CONTRAST: CPT

## 2022-05-19 RX ADMIN — IOHEXOL 65 ML: 350 INJECTION, SOLUTION INTRAVENOUS at 10:33

## 2022-05-23 ENCOUNTER — TELEPHONE (OUTPATIENT)
Dept: FAMILY MEDICINE CLINIC | Facility: CLINIC | Age: 62
End: 2022-05-23

## 2022-05-23 ENCOUNTER — OFFICE VISIT (OUTPATIENT)
Dept: HEMATOLOGY ONCOLOGY | Facility: CLINIC | Age: 62
End: 2022-05-23
Payer: COMMERCIAL

## 2022-05-23 VITALS
TEMPERATURE: 97.4 F | OXYGEN SATURATION: 98 % | HEIGHT: 66 IN | WEIGHT: 172 LBS | DIASTOLIC BLOOD PRESSURE: 70 MMHG | HEART RATE: 67 BPM | RESPIRATION RATE: 16 BRPM | BODY MASS INDEX: 27.64 KG/M2 | SYSTOLIC BLOOD PRESSURE: 122 MMHG

## 2022-05-23 DIAGNOSIS — C7B.02 METASTATIC MALIGNANT CARCINOID TUMOR TO LIVER (HCC): Primary | ICD-10-CM

## 2022-05-23 DIAGNOSIS — B37.9 YEAST INFECTION: Primary | ICD-10-CM

## 2022-05-23 PROCEDURE — 99214 OFFICE O/P EST MOD 30 MIN: CPT | Performed by: INTERNAL MEDICINE

## 2022-05-23 PROCEDURE — 1036F TOBACCO NON-USER: CPT | Performed by: INTERNAL MEDICINE

## 2022-05-23 PROCEDURE — 3008F BODY MASS INDEX DOCD: CPT | Performed by: INTERNAL MEDICINE

## 2022-05-23 RX ORDER — FLUCONAZOLE 150 MG/1
150 TABLET ORAL ONCE
Qty: 1 TABLET | Refills: 0 | Status: SHIPPED | OUTPATIENT
Start: 2022-05-23 | End: 2022-05-23

## 2022-05-23 NOTE — TELEPHONE ENCOUNTER
Pt was in last week and was given amoxicillan  Now she has a yeast infection  Can you call in a script to her pharmacy?   Advise

## 2022-05-23 NOTE — PROGRESS NOTES
Hematology/Oncology Progress Note    Date of Service: 5/23/2022    [facility]   [address]  [place] [address] [address]    Hem/Onc Problem List:     1  Metastatic malignant carcinoid tumor  Chief Complaint:    Establish care because Dr Angelo Cedeno is no longer working for [facility]    Assessment/Plan:     I personally reviewed the lab results, image studies results and other specialties/physicians consult notes and recommendations  I shared the findings with patient, discussed the diagnosis and management plan as below  1  Metastatic neuroendocrine tumor with liver metastasis, initially diagnosed in August 2019  Patient underwent right hemicolectomy and intraoperative liver lesion ablation in August 22, 2019  pT3 N1M1, grade 1, 3/24 lymph node positive for metastasis  Status post monthly Sandostatin patient October 2019 to November 2021  Patient achieved stable disease  She has been on observation  Last CT scan in May 2022 showed stable liver metastasis  Patient is asymptomatic except occasional diarrhea  Patient takes Imodium which helped  Will continue to monitor patient  Repeat labs and CT scan in 3 months  2  Diarrhea due to neuroendocrine tumor  Patient can continue disease Imodium as needed  Sandostatin can be resumed if patient have uncontrolled diarrhea  3  Follow-up:  Return to clinic in 3 months  Patient is aware that I will leave this practice in 1 month  Her care will be transferred to a new physician  Disclaimer: This document was prepared using [employer] technology  If a word or phrase is confusing, or does not make sense, this is likely due to recognition error which was not discovered during the providers review  If you believe an error has occurred, please Contact me through [employer] service for nighat? cation      AJCC 8th Edition Cancer Stage :      Cancer Staging  Carcinoid tumor of ileum  Staging form: Neuroendocrine Tumor - Jejunum/Ileum, AJCC 8th Edition  - Pathologic stage from 8/22/2019: Stage IV (pT3, pN1, cM1) - Unsigned  Histologic grade (G): G1  Histologic grading system: 3 grade system  Sites of metastasis: Liver  Lymph-vascular invasion (LVI): BOTH lymphatic and small vessel AND venous (large vessel) invasion      Hematology/Oncology History:   - Previously seen by Dr Domingo Garcia     1  GI carcinoid tumor, T3N1M1  G1  - presented with diarrhea ongoing for 2 years   Colonoscopy showed polyps, biopsy showed GI carcinoid tumor     - Ki 67 less than 3 percent   Low to intermediate grade     -  PET-CT showed intra-abdominal lymphadenopathy and liver leison   Cancer marker all normal before surgery     - 8/22/2019 : s/p Right hemicolectomy, cholecystectomy, 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);  Found Lymphovascular and perineural invasion are present;   C3X2C3  G1;  3/24 LN  - 8 / 22/2019 :  Intraoperative  liver lesion ablation  - 4/2021 : CT C/a/P Showed questionable liver lesion   MRI showed liver lesion is stable  - 7/2021:  CT chest abdomen pelvic showed stable liver lesion   Status post hernia surgery in 07/19/2021  - 11/2021: CT abd/pelvis shows stable hepatic lesions  Patient saw Dr Som Munoz afterwards and Sandostatin Q monthly was stopped  She was on this - 11/2021   Now on observation           Oncology History   Carcinoid tumor of ileum   6/7/2019 Initial Diagnosis     Carcinoid tumor of ileum      8/22/2019 Surgery     Right hemicolectomy, cholecystectomy  - 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)  - Lymphovascular and perineural invasion are present  T3N1M1  G1  3/24 LN         · May 19, 2022 CT scan abdomen pelvis with contrast:  IMPRESSION:     Multiple enhancing liver lesions again noted as detailed above, not significantly changed compared to the previous exam   No additional suspicious lesions/metastatic disease in the abdomen or pelvis  Ancillary findings detailed above  History of Present Illiness:   Chloé Rico is a 62 y o  female with the above-noted HemOnc history who is here for management of metastatic neuroendocrine tumor with liver involvement  Patient had persistent diarrhea ongoing for 2 years  Colonoscopy showed a polyp, biopsy confirmed GI carcinoid tumor  Ki-67 less than 3%  Patient underwent right hemicolectomy, cholecystectomy on August 22, 2029 under the care of Dr Yari Morrison  Final pathology showed T3 N1 M1, grade 1, 3/24 lymph node positive for metastasis  Patient also underwent intraoperative ablation of liver metastasis  Status post Sandostatin monthly given between October 2019 to November 2021  Currently, patient is on observation  CT scan in May, 2022 showed stable disease  Patient feels fine  No fever or chills  No exertional chest pain, diaphoresis or shortness  of breath  No cough and phlegm, no hemoptysis  Patient complained of occasion diarrhea that can be controlled with Imodium  No abdominal pain  No  symptoms  No headache or blurred vision  No seizure activity  Appetite good  No significant weight loss or weight gain  The patient denies bleeding anywhere  ROS: A 12-point of review of systems is obtained and other than the above is noncontributory  Objective:   VITALS:   /70 (BP Location: Left arm, Patient Position: Sitting, Cuff Size: Adult)   Pulse 67   Temp (!) 97 4 °F (36 3 °C)   Resp 16   Ht 5' 6" (1 676 m)   Wt 78 kg (172 lb)   SpO2 98%   BMI 27 76 kg/m²     Physical EXAM:  General:  Alert, cooperative, no distress, appears stated age  Head:  Normocephalic, without obvious abnormality, atraumatic  Eyes:  Conjunctivae/corneas clear  PERRL, EOMs intact  No evidence of conjunctivitis     Throat: Lips, mucosa, and tongue normal   No bleeding from mouth     Neck: Supple, symmetrical, trachea midline    Lungs:   Clear to auscultation bilaterally  Respiratory effort easy, nonlabored    Heart:  Regular rate and rhythm, S1, S2 normal, no murmur  Abdomen:   Soft, non-tender,nondistended  Bowel sounds normal  No masses,  No organomegaly  Extremities:  Lymphatics: Extremities normal, atraumatic, no cyanosis or edema  No cervical, axillary or inguinal adenopathy   Skin: Skin color, texture, turgor normal  No rashes  Neurologic: A&Ox4   No focal neuro deficits       Allergies   Allergen Reactions    Prednisone Other (See Comments) and Confusion     Psychosis        Past Medical History:   Diagnosis Date    Cancer ([address] ) 2019    neuroendocrine tumor    Psychiatric disorder     Scoliosis     Strep throat     Weight gain        Past Surgical History:   Procedure Laterality Date    BREAST BIOPSY Right 2016    benign    BREAST BIOPSY Right 2021    benign    BREAST CYST EXCISION Right     in 30's-benign    CHOLECYSTECTOMY N/A 8/22/2019    Procedure: CHOLECYSTECTOMY;  Surgeon: Talya Mckeon MD;  [facility];  Service: Surgical Oncology    COLECTOMY TOTAL Right 8/22/2019    Procedure: HEMICOLECTOMY;  Surgeon: Talya Mckeon MD;  [facility];  Service: Surgical Oncology    HYSTERECTOMY  2010    LAPAROTOMY N/A 8/22/2019    Procedure: LAPAROTOMY EXPLORATORY;  Surgeon: Talya Mckeon MD;  [facility];  Service: Surgical Oncology    PARTIAL HYSTERECTOMY      MA ABLTJ 1/> LVR BRYNN PRQ RF N/A 8/22/2019    Procedure: ABLATION MICROWAVE; INTRAOPERATIVE ULTRASOUND OF THE LIVER;  Surgeon: Talya Mckeon MD;  [facility];  Service: Surgical Oncology    MA REPAIR INCISIONAL HERNIA,REDUCIBLE N/A 7/19/2021    Procedure: INCISIONAL HERNIA REPAIR;  Surgeon: Yocasta Casey DO;  [facility];  Service: General    TUBAL LIGATION      US GUIDED BREAST BIOPSY RIGHT COMPLETE Right 3/19/2021       Family History   Problem Relation Age of Onset    Thyroid cancer Mother 76    Multiple myeloma Father 68    No Known Problems Maternal Grandmother     No Known Problems Paternal Grandmother     No Known Problems Maternal Aunt     No Known Problems Paternal Aunt     No Known Problems Paternal Aunt     No Known Problems Paternal Aunt     Breast cancer Other         age unknown    No Known Problems Brother        Social History     Socioeconomic History    Marital status: /Civil Union     Spouse name: Not on file    Number of children: Not on file    Years of education: Not on file    Highest education level: Not on file   Occupational History    Not on file   Tobacco Use    Smoking status: Never Smoker    Smokeless tobacco: Never Used   Vaping Use    Vaping Use: Never used   Substance and Sexual Activity    Alcohol use: Not Currently    Drug use: Not Currently    Sexual activity: Yes   Other Topics Concern    Not on file   Social History Narrative      2 sons    2 Grandkids     Social Determinants of Health     Financial Resource Strain: Not on file   Food Insecurity: Not on file   Transportation Needs: Not on file   Physical Activity: Not on file   Stress: Not on file   Social Connections: Not on file   Intimate Partner Violence: Not on file   Housing Stability: Not on file       Current Outpatient Medications   Medication Sig Dispense Refill    amoxicillin (AMOXIL) 875 mg tablet Take 1 tablet (875 mg total) by mouth in the morning and 1 tablet (875 mg total) in the evening  Do all this for 7 days  14 tablet 0    Brexpiprazole (REXULTI) 0 5 MG tablet Take 0 5 mg by mouth daily at bedtime       citalopram (CeleXA) 20 mg tablet Take 1 tablet (20 mg total) by mouth daily (Patient taking differently: Take 20 mg by mouth daily at bedtime) 30 tablet 2     No current facility-administered medications for this visit  (Not in a hospital admission)      DATA REVIEW:    Pathology Result:    Final Diagnosis   Date Value Ref Range Status   03/19/2021   Final    A   Breast, Right, [address], Biopsy:  - Proliferative fibrocystic changes, without atypia, including usual ductal hyperplasia, apocrine metaplasia and stromal fibrosis  - Intraductal microcalcifications are present   - No evidence of malignancy  08/22/2019   Final    A  Terminal ileum, ascending colon, and appendix, right hemicolectomy:  - 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), G1, see synoptic report for details  - Lymphovascular and perineural invasion are present  - Colon with no pathologic abnormality   - Appendix with distal fibrous obliteration   - All margins are negative for tumor   - Three of twenty-four lymph nodes are positive for tumor (3/24)  Comment: Immunohistochemistry was performed on block A8  The tumor cells are positive for synaptophysin, chromogranin and CD56  Ki-67 shows mitotic proliferative index of approximately 1%  Positive and negative controls were evaluated and were appropriate  Immunohistochemistry for desmin is performed on tissue block A11 and confirms vascular invasion    A  Gallbladder, cholecystectomy:  - Gallbladder with no significant pathologic abnormality  - Negative for malignancy  Intradepartmental consultation is in agreement  Interpretation performed at [place] [id number]       06/07/2019   Final    A  Terminal ileum, polyp, endoscopic biopsy:  - Neuroendocrine tumor (carcinoid) involving small intestinal lamina propria and muscularis    mucosae  See note  B  Colon, random endoscopic biopsy:  - Colonic mucosa with no pathologic alteration    - No evidence of lymphocytic or collagenous colitis  Note: The H & E and immunohistochemistry stained slides were sent to Dr Viridiana Smiley, Department of pathology at [place] [address], for her expert opinion and review, and the above reflects her diagnosis  Her letter with diagnosis reads as below      Dr Bhumi Obregon was notified of preliminary findings via phone on 6/11/2019 at 3:10 pm             Image Results: They are reviewed and documented in Hematology/Oncology history    CT abdomen pelvis w contrast  Narrative: CT ABDOMEN AND PELVIS WITH IV CONTRAST    INDICATION:   C7B 02: Secondary carcinoid tumors of liver  COMPARISON:  11/16/2021  TECHNIQUE:  CT examination of the abdomen and pelvis was performed  Axial, sagittal, and coronal 2D reformatted images were created from the source data and submitted for interpretation  Radiation dose length product (DLP) for this visit:  987 mGy-cm   This examination, like all CT scans performed in the [facility], was performed utilizing techniques to minimize radiation dose exposure, including the use of iterative   reconstruction and automated exposure control  IV Contrast:  65 mL of iohexol (OMNIPAQUE)  Enteric Contrast:  Enteric contrast was not administered  FINDINGS:    ABDOMEN    LOWER CHEST:  No clinically significant abnormality identified in the visualized lower chest     LIVER/BILIARY TREE:  Mild diffuse hepatic steatosis again noted  1 7 x 1 3 cm hypodense lesion in the right hepatic lobe segment 7 is seen, previously this area demonstrated subtle peripheral enhancement  Stable 7 mm enhancing focus in segment 7 is   also seen  2 4 cm segment 8 enhancing focus is stable  1 5 cm segment 7 lesion enhancing focus is stable  1 7 cm segment 6 lesion is also stable  No new/additional enhancing liver lesions identified  GALLBLADDER:  Not visualized, likely surgically absent  SPLEEN:  Unremarkable  PANCREAS:  Unremarkable  ADRENAL GLANDS:  Unremarkable  KIDNEYS/URETERS: Kidneys are normal in size and position  No suspicious mass, hydronephrosis, nephrolithiasis, or perinephric fluid collection/inflammatory stranding noted bilaterally  A few too small to characterize hypodensities again noted in both   kidneys      STOMACH AND BOWEL:  Stomach is mildly distended with oral contrast and air  No intraluminal mass or abnormal wall thickening seen  Administered oral contrast has reached the rectum  The small and large bowel loops appear normal in course and caliber   without evidence for obstruction or inflammation  Terminal ileum is unremarkable  Appendix is not definitively seen  APPENDIX:  No findings to suggest appendicitis  ABDOMINOPELVIC CAVITY:  No ascites  No pneumoperitoneum  No lymphadenopathy  VESSELS:  Unremarkable for patient's age  PELVIS    REPRODUCTIVE ORGANS:  Surgical changes of prior hysterectomy  URINARY BLADDER:  Mildly distended without intraluminal mass  Punctate pelvic phleboliths are seen  ABDOMINAL WALL/INGUINAL REGIONS:  Unremarkable  OSSEOUS STRUCTURES:  No acute fracture or destructive osseous lesion  Levolumbar scoliosis and multilevel degenerative changes of the thoracic spine again noted  Impression: Multiple enhancing liver lesions again noted as detailed above, not significantly changed compared to the previous exam   No additional suspicious lesions/metastatic disease in the abdomen or pelvis  Ancillary findings detailed above  Workstation performed: [id number]        LABS:  Lab data are reviewed and documented in HemOnc history  No results found for this or any previous visit (from the past 48 hour(s))            Susu Xiong MD  5/23/2022, 9:33 AM

## 2022-08-17 ENCOUNTER — APPOINTMENT (OUTPATIENT)
Dept: LAB | Facility: HOSPITAL | Age: 62
End: 2022-08-17
Payer: COMMERCIAL

## 2022-08-17 DIAGNOSIS — C7B.02 METASTATIC MALIGNANT CARCINOID TUMOR TO LIVER (HCC): ICD-10-CM

## 2022-08-17 LAB
ALBUMIN SERPL BCP-MCNC: 4 G/DL (ref 3.5–5)
ALP SERPL-CCNC: 85 U/L (ref 46–116)
ALT SERPL W P-5'-P-CCNC: 69 U/L (ref 12–78)
ANION GAP SERPL CALCULATED.3IONS-SCNC: 9 MMOL/L (ref 4–13)
AST SERPL W P-5'-P-CCNC: 31 U/L (ref 5–45)
BASOPHILS # BLD AUTO: 0.05 THOUSANDS/ΜL (ref 0–0.1)
BASOPHILS NFR BLD AUTO: 1 % (ref 0–1)
BILIRUB SERPL-MCNC: 0.46 MG/DL (ref 0.2–1)
BUN SERPL-MCNC: 15 MG/DL (ref 5–25)
CALCIUM SERPL-MCNC: 9.6 MG/DL (ref 8.3–10.1)
CHLORIDE SERPL-SCNC: 103 MMOL/L (ref 96–108)
CO2 SERPL-SCNC: 25 MMOL/L (ref 21–32)
CREAT SERPL-MCNC: 0.83 MG/DL (ref 0.6–1.3)
EOSINOPHIL # BLD AUTO: 0.17 THOUSAND/ΜL (ref 0–0.61)
EOSINOPHIL NFR BLD AUTO: 2 % (ref 0–6)
ERYTHROCYTE [DISTWIDTH] IN BLOOD BY AUTOMATED COUNT: 11.5 % (ref 11.6–15.1)
GFR SERPL CREATININE-BSD FRML MDRD: 76 ML/MIN/1.73SQ M
GLUCOSE P FAST SERPL-MCNC: 101 MG/DL (ref 65–99)
HCT VFR BLD AUTO: 37.8 % (ref 34.8–46.1)
HGB BLD-MCNC: 13 G/DL (ref 11.5–15.4)
IMM GRANULOCYTES # BLD AUTO: 0.04 THOUSAND/UL (ref 0–0.2)
IMM GRANULOCYTES NFR BLD AUTO: 1 % (ref 0–2)
LYMPHOCYTES # BLD AUTO: 2.3 THOUSANDS/ΜL (ref 0.6–4.47)
LYMPHOCYTES NFR BLD AUTO: 29 % (ref 14–44)
MCH RBC QN AUTO: 30.8 PG (ref 26.8–34.3)
MCHC RBC AUTO-ENTMCNC: 34.4 G/DL (ref 31.4–37.4)
MCV RBC AUTO: 90 FL (ref 82–98)
MONOCYTES # BLD AUTO: 0.46 THOUSAND/ΜL (ref 0.17–1.22)
MONOCYTES NFR BLD AUTO: 6 % (ref 4–12)
NEUTROPHILS # BLD AUTO: 5.06 THOUSANDS/ΜL (ref 1.85–7.62)
NEUTS SEG NFR BLD AUTO: 61 % (ref 43–75)
NRBC BLD AUTO-RTO: 0 /100 WBCS
PLATELET # BLD AUTO: 301 THOUSANDS/UL (ref 149–390)
PMV BLD AUTO: 10.1 FL (ref 8.9–12.7)
POTASSIUM SERPL-SCNC: 4.1 MMOL/L (ref 3.5–5.3)
PROT SERPL-MCNC: 7.9 G/DL (ref 6.4–8.4)
RBC # BLD AUTO: 4.22 MILLION/UL (ref 3.81–5.12)
SODIUM SERPL-SCNC: 137 MMOL/L (ref 135–147)
WBC # BLD AUTO: 8.08 THOUSAND/UL (ref 4.31–10.16)

## 2022-08-17 PROCEDURE — 86316 IMMUNOASSAY TUMOR OTHER: CPT

## 2022-08-17 PROCEDURE — 80053 COMPREHEN METABOLIC PANEL: CPT

## 2022-08-17 PROCEDURE — 36415 COLL VENOUS BLD VENIPUNCTURE: CPT

## 2022-08-17 PROCEDURE — 85025 COMPLETE CBC W/AUTO DIFF WBC: CPT

## 2022-08-19 LAB — CGA SERPL-MCNC: 29 NG/ML (ref 0–101.8)

## 2022-08-23 ENCOUNTER — HOSPITAL ENCOUNTER (OUTPATIENT)
Dept: CT IMAGING | Facility: HOSPITAL | Age: 62
Discharge: HOME | End: 2022-08-23
Attending: INTERNAL MEDICINE
Payer: COMMERCIAL

## 2022-08-23 DIAGNOSIS — C7B.02 METASTATIC MALIGNANT CARCINOID TUMOR TO LIVER (HCC): ICD-10-CM

## 2022-08-23 PROCEDURE — 74177 CT ABD & PELVIS W/CONTRAST: CPT

## 2022-08-23 PROCEDURE — G1004 CDSM NDSC: HCPCS

## 2022-08-23 RX ADMIN — IOHEXOL 70 ML: 350 INJECTION, SOLUTION INTRAVENOUS at 09:05

## 2022-08-24 NOTE — PROGRESS NOTES
Hematology/Oncology Progress Note    Date of Service: 9/1/2022    7 [facility]  [facility]   [address] [facility] [place] [address]    ECOG PS today: 0    Hem/Onc Problem List:     1  Metastatic malignant carcinoid tumor  Chief Complaint:    VALDO    Assessment/Plan:       1  Metastatic neuroendocrine tumor with liver metastasis, initially diagnosed in August 2019  Patient underwent right hemicolectomy and intraoperative liver lesion ablation in August 22, 2019  pT3 N1M1, grade 1, 3/24 lymph node positive for metastasis  Status post monthly Sandostatin patient October 2019 to November 2021  Patient achieved stable disease  She has been on observation  Last CT scan in August 2022 showed stable liver metastasis  Patient is asymptomatic except occasional diarrhea  Patient takes Imodium which helped  Will continue to monitor patient  2  Diarrhea due to neuroendocrine tumor  Patient can continue disease Imodium as needed  Sandostatin can be resumed if patient have uncontrolled diarrhea  · Discussion of decision making     I personally reviewed the following lab results, the image studies, pathology, other specialty/physicians consult notes and recommendations, and outside medical records from [facility]  I had a lengthy discussion with the patient and shared the work-up findings  We discussed the diagnosis and management plan as below   I spent 34  minutes reviewing the records (labs, clinician notes, outside records, medical history, ordering medicine/tests/procedures, interpreting the imaging/labs previously done) and coordination of care as well as direct time with the patient today, of which greater than 50% of the time was spent in counseling and coordination of care with the patient/family        · Plan/Labs  -CBC, CMP in 6 months  -CT CAP w/c in 6 months        Follow Up: 6 months     All questions were answered to the patient's satisfaction during this encounter  The patient knows the contact information for our office and knows to reach out for any relevant concerns related to this encounter  They are to call for any temperature 100 4 or higher, new symptoms including but not restricted to shaking chills, decreased appetite, nausea, vomiting, diarrhea, increased fatigue, shortness of breath or chest pain, confusion, and not feeling the strength to come to the clinic  For all other listed problems and medical diagnosis in their chart - they are managed by PCP and/or other specialists, which the patient acknowledges  Thank you very much for your consultation and making us a part of this patient's care  We are continuing to follow closely with you  Please do not hesitate to reach out to me with any additional questions or concerns      Ana Erazo MD  Hematology & Medical Oncology Staff Physician     Disclaimer: This document was prepared using [employer] technology  If a word or phrase is confusing, or does not make sense, this is likely due to recognition error which was not discovered during the providers review  If you believe an error has occurred, please Contact me through [employer] service for nighat? cation        AJCC 8th Edition Cancer Stage :      Cancer Staging  Carcinoid tumor of ileum  Staging form: Neuroendocrine Tumor - Jejunum/Ileum, AJCC 8th Edition  - Pathologic stage from 8/22/2019: Stage IV (pT3, pN1, cM1) - Unsigned  Histologic grade (G): G1  Histologic grading system: 3 grade system  Sites of metastasis: Liver  Lymph-vascular invasion (LVI): BOTH lymphatic and small vessel AND venous (large vessel) invasion      Hematology/Oncology History:   - Previously seen by Dr Juan Cardenas     1  GI carcinoid tumor, T3N1M1  G1  - presented with diarrhea ongoing for 2 years   Colonoscopy showed polyps, biopsy showed GI carcinoid tumor     - Ki 67 less than 3 percent   Low to intermediate grade     -  PET-CT showed intra-abdominal lymphadenopathy and liver leison   Cancer marker all normal before surgery     - 8/22/2019 : s/p Right hemicolectomy, cholecystectomy, 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);  Found Lymphovascular and perineural invasion are present;   [id number]  G1;  3/24 LN  - 8 / 22/2019 :  Intraoperative  liver lesion ablation  - 4/2021 : CT C/a/P Showed questionable liver lesion   MRI showed liver lesion is stable  - 7/2021:  CT chest abdomen pelvic showed stable liver lesion   Status post hernia surgery in 07/19/2021  - 11/2021: CT abd/pelvis shows stable hepatic lesions  Patient saw Dr Yelena Rollins afterwards and Sandostatin Q monthly was stopped  She was on this - 11/2021  Now on observation           Oncology History   Carcinoid tumor of ileum   6/7/2019 Initial Diagnosis     Carcinoid tumor of ileum      8/22/2019 Surgery     Right hemicolectomy, cholecystectomy  - 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)  - Lymphovascular and perineural invasion are present  T3N1M1  G1  3/24 LN         · May 19, 2022 CT scan abdomen pelvis with contrast:  IMPRESSION:     Multiple enhancing liver lesions again noted as detailed above, not significantly changed compared to the previous exam   No additional suspicious lesions/metastatic disease in the abdomen or pelvis  Ancillary findings detailed above   -8/23/2022 CT Abd/pelv w/c: Stable post ablation cavity with surrounding scarring in hepatic segment 8  Stable multiple arterial enhancing lesions   Again seen is an ablation cavity measuring 2 0 x 1 6 cm (previously 2 3 x 1 4 cm) in segment 8  Additional index lesions as follows (series 2):  10 mm (previously 11 mm) segment 8 lesion on image #17, stable  9 mm (previously 12 mm) segment 8 lesion on image #25, stable  12 mm (previously 12 mm) segment 7 lesion on image #22, stable    17 x 9 mm (previously 18 x 10 mm) segment 7 lesion on image #28, stable  10 mm (previously 12 mm) subcapsular segment 7/8 lesion on image #43, stable  6 mm (previously 8 mm) subcapsular segment 2/3 lesion on image #38, stable  5 mm segment 2 subcapsular lesion on image #32, not well visualized on the previous study  History of Present Illiness:   Jewel Hernández is a 62 y o  female with the above-noted HemOnc history who is here for management of metastatic neuroendocrine tumor with liver involvement  Patient had persistent diarrhea ongoing for 2 years  Colonoscopy showed a polyp, biopsy confirmed GI carcinoid tumor  Ki-67 less than 3%  Patient underwent right hemicolectomy, cholecystectomy on August 22, 2029 under the care of Dr Sheldon Yuan  Final pathology showed T3 N1 M1, grade 1, 3/24 lymph node positive for metastasis  Patient also underwent intraoperative ablation of liver metastasis  Status post Sandostatin monthly given between October 2019 to November 2021  Currently, patient is on observation  CT scan in May, 2022 showed stable disease  Interval events:   Patient feels very good  She's only needed to use Immodium on certain days and only in prophylaxis  She goes many days without using it  She gets infrequently chest palpitations  Denies night sweats or flushing of the skin during the day  Denies F/C, N/V, SOB, CP, LH, HA, gen weakness, hematuria, melena, hematochezia, cough, abd pain, dysuria, change in urinary frequency  She has stable appetite and weight  ROS: A 10-point of review of systems is obtained and other than the above is noncontributory  Objective:   VITALS:   /84   Pulse 68   Temp 97 5 °F (36 4 °C)   Resp 18   Ht 5' 6" (1 676 m)   Wt 78 5 kg (173 lb)   SpO2 96%   BMI 27 92 kg/m²     Weight today: 173 lbs    Physical EXAM:  General:  Alert, cooperative, no distress, appears stated age  Head:  Normocephalic, without obvious abnormality, atraumatic  Eyes:  Conjunctivae/corneas clear  EOMs intact   No evidence of conjunctivitis     Throat: Lips, mucosa, and tongue normal   No bleeding from mouth  Neck: Supple, symmetrical, trachea midline    Lungs:   Clear to auscultation bilaterally  Respiratory effort easy, nonlabored    Heart:  Regular rate and rhythm, +S1, S2    Abdomen:   Soft, non-tender,nondistended  Bowel sounds normal      Extremities:  Lymphatics: Extremities normal, atraumatic, no cyanosis or edema  No cervical, axillary or inguinal adenopathy   Skin: Skin color, texture, turgor normal  No rashes  Neurologic: AAO   No focal neuro deficits noted b/l       Allergies   Allergen Reactions    Prednisone Other (See Comments) and Confusion     Psychosis        Past Medical History:   Diagnosis Date    Cancer ([facility] [address] ) 2019    neuroendocrine tumor    Psychiatric disorder     Scoliosis     Strep throat     Weight gain        Past Surgical History:   Procedure Laterality Date    BREAST BIOPSY Right 2016    benign    BREAST BIOPSY Right 2021    benign    BREAST CYST EXCISION Right     in 30's-benign    CHOLECYSTECTOMY N/A 8/22/2019    Procedure: CHOLECYSTECTOMY;  Surgeon: Carlos Garcia MD;  [facility];  Service: Surgical Oncology    COLECTOMY TOTAL Right 8/22/2019    Procedure: HEMICOLECTOMY;  Surgeon: Carlos Garcia MD;  [facility];  Service: Surgical Oncology    HYSTERECTOMY  2010    LAPAROTOMY N/A 8/22/2019    Procedure: LAPAROTOMY EXPLORATORY;  Surgeon: Carlos Garcia MD;  [facility];  Service: Surgical Oncology    PARTIAL HYSTERECTOMY      FL ABLTJ 1/> LVR BRYNN PRQ RF N/A 8/22/2019    Procedure: ABLATION MICROWAVE; INTRAOPERATIVE ULTRASOUND OF THE LIVER;  Surgeon: Carlos Garcia MD;  [facility];  Service: Surgical Oncology    FL REPAIR INCISIONAL HERNIA,REDUCIBLE N/A 7/19/2021    Procedure: INCISIONAL HERNIA REPAIR;  Surgeon: David Pierre DO;  [facility];  Service: General    TUBAL LIGATION      US GUIDED BREAST BIOPSY RIGHT COMPLETE Right 3/19/2021       Family History   Problem Relation Age of Onset    Thyroid cancer Mother 76    Multiple myeloma Father 68    No Known Problems Maternal Grandmother     No Known Problems Paternal Grandmother     No Known Problems Maternal Aunt     No Known Problems Paternal Aunt     No Known Problems Paternal Aunt     No Known Problems Paternal Aunt     Breast cancer Other         age unknown    No Known Problems Brother        Social History     Socioeconomic History    Marital status: /Civil Union     Spouse name: Not on file    Number of children: Not on file    Years of education: Not on file    Highest education level: Not on file   Occupational History    Not on file   Tobacco Use    Smoking status: Never Smoker    Smokeless tobacco: Never Used   Vaping Use    Vaping Use: Never used   Substance and Sexual Activity    Alcohol use: Not Currently    Drug use: Not Currently    Sexual activity: Yes   Other Topics Concern    Not on file   Social History Narrative      2 sons    2 Grandkids     Social Determinants of Health     Financial Resource Strain: Not on file   Food Insecurity: Not on file   Transportation Needs: Not on file   Physical Activity: Not on file   Stress: Not on file   Social Connections: Not on file   Intimate Partner Violence: Not on file   Housing Stability: Not on file       No current outpatient medications on file  No current facility-administered medications for this visit  (Not in a hospital admission)      DATA REVIEW:    Pathology Result:    Final Diagnosis   Date Value Ref Range Status   03/19/2021   Final    A  Breast, Right, [address] region, Biopsy:  - Proliferative fibrocystic changes, without atypia, including usual ductal hyperplasia, apocrine metaplasia and stromal fibrosis  - Intraductal microcalcifications are present   - No evidence of malignancy  08/22/2019   Final    A   Terminal ileum, ascending colon, and appendix, right hemicolectomy:  - 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), G1, see synoptic report for details  - Lymphovascular and perineural invasion are present  - Colon with no pathologic abnormality   - Appendix with distal fibrous obliteration   - All margins are negative for tumor   - Three of twenty-four lymph nodes are positive for tumor (3/24)  Comment: Immunohistochemistry was performed on block A8  The tumor cells are positive for synaptophysin, chromogranin and CD56  Ki-67 shows mitotic proliferative index of approximately 1%  Positive and negative controls were evaluated and were appropriate  Immunohistochemistry for desmin is performed on tissue block A11 and confirms vascular invasion    A  Gallbladder, cholecystectomy:  - Gallbladder with no significant pathologic abnormality  - Negative for malignancy  Intradepartmental consultation is in agreement  Interpretation performed at [place] [id number]       06/07/2019   Final    A  Terminal ileum, polyp, endoscopic biopsy:  - Neuroendocrine tumor (carcinoid) involving small intestinal lamina propria and muscularis    mucosae  See note  B  Colon, random endoscopic biopsy:  - Colonic mucosa with no pathologic alteration    - No evidence of lymphocytic or collagenous colitis  Note: The H & E and immunohistochemistry stained slides were sent to Dr Anali Oliveira, Department of pathology at [place] [address], for her expert opinion and review, and the above reflects her diagnosis  Her letter with diagnosis reads as below  Dr Jhoana Bernabe was notified of preliminary findings via phone on 6/11/2019 at 3:10 pm             Image Results: They are reviewed and documented in Hematology/Oncology history    CT abdomen pelvis w contrast  Narrative: CT ABDOMEN AND PELVIS WITH IV CONTRAST    INDICATION:   C7B 02: Secondary carcinoid tumors of liver    History of malignant tumor status post microwave ablation, right hemicolectomy and cholecystectomy  COMPARISON:  CT dated 5/19/2022    TECHNIQUE:  CT examination of the abdomen and pelvis was performed  Axial, sagittal, and coronal 2D reformatted images were created from the source data and submitted for interpretation  Radiation dose length product (DLP) for this visit:  1564 mGy-cm   This examination, like all CT scans performed in the [facility], was performed utilizing techniques to minimize radiation dose exposure, including the use of iterative   reconstruction and automated exposure control  IV Contrast:  70 mL of iohexol (OMNIPAQUE)  Enteric Contrast:  Enteric contrast was administered  FINDINGS:    ABDOMEN    LOWER CHEST:  No clinically significant abnormality identified in the visualized lower chest     LIVER/BILIARY TREE:    Again seen is an ablation cavity measuring 2 0 x 1 6 cm (previously 2 3 x 1 4 cm) in segment 8  Additional index lesions as follows (series 2):  10 mm (previously 11 mm) segment 8 lesion on image #17, stable  9 mm (previously 12 mm) segment 8 lesion on image #25, stable  12 mm (previously 12 mm) segment 7 lesion on image #22, stable  17 x 9 mm (previously 18 x 10 mm) segment 7 lesion on image #28, stable  10 mm (previously 12 mm) subcapsular segment 7/8 lesion on image #43, stable  6 mm (previously 8 mm) subcapsular segment 2/3 lesion on image #38, stable  5 mm segment 2 subcapsular lesion on image #32, not well visualized on the previous study  GALLBLADDER:  Gallbladder is surgically absent  SPLEEN:  Unremarkable  PANCREAS:  Unremarkable  ADRENAL GLANDS:  Unremarkable  KIDNEYS/URETERS:  No hydronephrosis or urinary tract calculus  One or more sharply circumscribed subcentimeter renal hypodensities are present, too small to accurately characterize, and statistically most likely benign findings   According to recent   literature (Radiology 2019) no further workup of these findings is recommended  STOMACH AND BOWEL:  Status post right hemicolectomy  No bowel obstruction  APPENDIX:  No findings to suggest appendicitis  ABDOMINOPELVIC CAVITY:  No ascites  No pneumoperitoneum  No lymphadenopathy  VESSELS:  Unremarkable for patient's age  PELVIS    REPRODUCTIVE ORGANS:  Surgical changes of prior hysterectomy  URINARY BLADDER:  Unremarkable  ABDOMINAL WALL/INGUINAL REGIONS:  There is diastases recti  Anterior midline abdominal wall scar  OSSEOUS STRUCTURES:  Scoliosis with multilevel spondylosis  Impression: 1  Stable post ablation cavity with surrounding scarring in hepatic segment 8   2   Stable multiple arterial enhancing lesions as described above  Comparison is slightly limited with differences in contrast timing between the 2 studies  Recommend inclusion of arterial phase imaging on subsequent studies for consistency in   comparison  Workstation performed: [id number]        LABS:  Lab data are reviewed and documented in HemOnc history  No results found for this or any previous visit (from the past 48 hour(s))            Balaji Baez MD  9/1/2022, 9:12 AM

## 2022-09-01 ENCOUNTER — OFFICE VISIT (OUTPATIENT)
Dept: HEMATOLOGY ONCOLOGY | Facility: CLINIC | Age: 62
End: 2022-09-01
Payer: COMMERCIAL

## 2022-09-01 VITALS
TEMPERATURE: 97.5 F | SYSTOLIC BLOOD PRESSURE: 160 MMHG | HEIGHT: 66 IN | RESPIRATION RATE: 18 BRPM | DIASTOLIC BLOOD PRESSURE: 84 MMHG | WEIGHT: 173 LBS | HEART RATE: 68 BPM | BODY MASS INDEX: 27.8 KG/M2 | OXYGEN SATURATION: 96 %

## 2022-09-01 DIAGNOSIS — D3A.012 CARCINOID TUMOR OF ILEUM, UNSPECIFIED WHETHER MALIGNANT: Primary | ICD-10-CM

## 2022-09-01 PROCEDURE — 99214 OFFICE O/P EST MOD 30 MIN: CPT | Performed by: INTERNAL MEDICINE

## 2022-09-26 ENCOUNTER — HOSPITAL ENCOUNTER (OUTPATIENT)
Dept: MAMMOGRAPHY | Facility: CLINIC | Age: 62
Discharge: HOME | End: 2022-09-26
Payer: COMMERCIAL

## 2022-09-26 VITALS — WEIGHT: 173 LBS | HEIGHT: 66 IN | BODY MASS INDEX: 27.8 KG/M2

## 2022-09-26 DIAGNOSIS — Z12.31 SCREENING MAMMOGRAM FOR HIGH-RISK PATIENT: ICD-10-CM

## 2022-09-26 PROCEDURE — 77063 BREAST TOMOSYNTHESIS BI: CPT

## 2022-09-26 PROCEDURE — 77067 SCR MAMMO BI INCL CAD: CPT

## 2023-02-24 ENCOUNTER — HOSPITAL ENCOUNTER (OUTPATIENT)
Dept: CT IMAGING | Facility: HOSPITAL | Age: 63
End: 2023-02-24
Attending: INTERNAL MEDICINE

## 2023-02-24 DIAGNOSIS — D3A.012 CARCINOID TUMOR OF ILEUM, UNSPECIFIED WHETHER MALIGNANT: ICD-10-CM

## 2023-02-24 RX ADMIN — IOHEXOL 100 ML: 350 INJECTION, SOLUTION INTRAVENOUS at 08:00

## 2023-03-09 ENCOUNTER — OFFICE VISIT (OUTPATIENT)
Dept: OPHTHALMOLOGY | Facility: CLINIC | Age: 63
End: 2023-03-09
Payer: COMMERCIAL

## 2023-03-09 DIAGNOSIS — H35.89 MACULAR RPE MOTTLING: Primary | ICD-10-CM

## 2023-03-09 DIAGNOSIS — H52.7 REFRACTIVE ERRORS: ICD-10-CM

## 2023-03-09 DIAGNOSIS — H35.369 DRUSEN OF MACULA, UNSPECIFIED LATERALITY: ICD-10-CM

## 2023-03-09 PROCEDURE — 99999 PR PBB SHADOW E&M-NEW PATIENT-LVL II: ICD-10-PCS | Mod: PBBFAC,,, | Performed by: OPTOMETRIST

## 2023-03-09 PROCEDURE — 92015 PR REFRACTION: ICD-10-PCS | Mod: S$GLB,,, | Performed by: OPTOMETRIST

## 2023-03-09 PROCEDURE — 92015 DETERMINE REFRACTIVE STATE: CPT | Mod: S$GLB,,, | Performed by: OPTOMETRIST

## 2023-03-09 PROCEDURE — 99999 PR PBB SHADOW E&M-NEW PATIENT-LVL II: CPT | Mod: PBBFAC,,, | Performed by: OPTOMETRIST

## 2023-03-09 PROCEDURE — 1159F PR MEDICATION LIST DOCUMENTED IN MEDICAL RECORD: ICD-10-PCS | Mod: CPTII,S$GLB,, | Performed by: OPTOMETRIST

## 2023-03-09 PROCEDURE — 92004 COMPRE OPH EXAM NEW PT 1/>: CPT | Mod: S$GLB,,, | Performed by: OPTOMETRIST

## 2023-03-09 PROCEDURE — 92004 PR EYE EXAM, NEW PATIENT,COMPREHESV: ICD-10-PCS | Mod: S$GLB,,, | Performed by: OPTOMETRIST

## 2023-03-09 PROCEDURE — 1159F MED LIST DOCD IN RCRD: CPT | Mod: CPTII,S$GLB,, | Performed by: OPTOMETRIST

## 2023-03-09 RX ORDER — AMOXICILLIN 500 MG/1
CAPSULE ORAL
COMMUNITY
Start: 2022-12-21

## 2023-03-09 NOTE — PROGRESS NOTES
HPI    Watery, itchy eyes.  Last eye exam 09/20/2013 [facility].  Last eye visit with [facility] 09/05/2013.  Hx of Drusen  degenerative  of retina left eye  Last edited by Mag Anderson MA on 3/9/2023  3:28 PM.            Assessment /Plan     For exam results, see Encounter Report.    Macular RPE mottling    Drusen of macula, unspecified laterality    Refractive errors      Unchanged macular mottling and drusen    Dispense Final Rx for glasses.  RTC 1 year  Discussed above and answered questions.

## 2023-03-14 NOTE — PROGRESS NOTES
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, retired    Hem/Onc Problem List:     1  Metastatic malignant carcinoid tumor  Chief Complaint:    Carcinoid f/u    Assessment/Plan:       1  Metastatic neuroendocrine tumor with liver metastasis, initially diagnosed in 2019  Patient underwent right hemicolectomy and intraoperative liver lesion ablation in 2019  pT3 N1M1, grade 1, 3/24 lymph node positive for metastasis  Status post monthly Sandostatin patient 2019 to 2021  Patient achieved stable disease  She has been on observation  Last CT scan in 2022 showed stable liver metastasis  Patient is asymptomatic except occasional diarrhea  Patient takes Imodium prn  Will continue to monitor patient  2  Diarrhea due to neuroendocrine tumor  Patient can continue disease Imodium as needed  Sandostatin can be resumed if patient have uncontrolled diarrhea  · Discussion of decision making     I personally reviewed the following lab results, the image studies, pathology, other specialty/physicians consult notes and recommendations, and outside medical records from [facility]  I had a lengthy discussion with the patient and shared the work-up findings  We discussed the diagnosis and management plan as below   I spent 31 minutes reviewing the records (labs, clinician notes, outside records, medical history, ordering medicine/tests/procedures, interpreting the imaging/labs previously done) and coordination of care as well as direct time with the patient today, of which greater than 50% of the time was spent in counseling and coordination of care with the patient/family        · Plan/Labs  -CBC, CMP in 6 months  -CT CAP w/c in 6 months        Follow Up: 6 months     All questions were answered to the patient's satisfaction during this encounter  The patient knows the contact information for our office and knows to reach out for any relevant concerns related to this encounter  They are to call for any temperature 100 4 or higher, new symptoms including but not restricted to shaking chills, decreased appetite, nausea, vomiting, diarrhea, increased fatigue, shortness of breath or chest pain, confusion, and not feeling the strength to come to the clinic  For all other listed problems and medical diagnosis in their chart - they are managed by PCP and/or other specialists, which the patient acknowledges  Thank you very much for your consultation and making us a part of this patient's care  We are continuing to follow closely with you  Please do not hesitate to reach out to me with any additional questions or concerns      Ana Harrell MD  Hematology & Medical Oncology Staff Physician     Disclaimer: This document was prepared using [employer] Direct technology  If a word or phrase is confusing, or does not make sense, this is likely due to recognition error which was not discovered during the providers review  If you believe an error has occurred, please Contact me through [employer] service for nighat? cation        AJCC 8th Edition Cancer Stage :      Cancer Staging  Carcinoid tumor of ileum  Staging form: Neuroendocrine Tumor - Jejunum/Ileum, AJCC 8th Edition  - Pathologic stage from 8/22/2019: Stage IV (pT3, pN1, cM1) - Unsigned  Histologic grade (G): G1  Histologic grading system: 3 grade system  Sites of metastasis: Liver  Lymph-vascular invasion (LVI): BOTH lymphatic and small vessel AND venous (large vessel) invasion      Hematology/Oncology History:   - Previously seen by Dr Brendon Ibarra     1  GI carcinoid tumor, T3N1M1  G1  - presented with diarrhea ongoing for 2 years   Colonoscopy showed polyps, biopsy showed GI carcinoid tumor     - Ki 67 less than 3 percent   Low to intermediate grade     -  PET-CT showed intra-abdominal lymphadenopathy and liver leison   Cancer marker all normal before surgery     - 8/22/2019 : s/p Right hemicolectomy, cholecystectomy, 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);  Found Lymphovascular and perineural invasion are present;   [id number]  G1;  3/24 LN  - 8 / 22/2019 :  Intraoperative  liver lesion ablation  - 4/2021 : CT C/a/P Showed questionable liver lesion   MRI showed liver lesion is stable  - 7/2021:  CT chest abdomen pelvic showed stable liver lesion   Status post hernia surgery in 07/19/2021  - 11/2021: CT abd/pelvis shows stable hepatic lesions  Patient saw Dr Carolee Man afterwards and Sandostatin Q monthly was stopped  She was on this - 11/2021  Now on observation           Oncology History   Carcinoid tumor of ileum   6/7/2019 Initial Diagnosis     Carcinoid tumor of ileum      8/22/2019 Surgery     Right hemicolectomy, cholecystectomy  - 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)  - Lymphovascular and perineural invasion are present  T3N1M1  G1  3/24 LN         · May 19, 2022 CT scan abdomen pelvis with contrast:  IMPRESSION:     Multiple enhancing liver lesions again noted as detailed above, not significantly changed compared to the previous exam   No additional suspicious lesions/metastatic disease in the abdomen or pelvis  Ancillary findings detailed above   -8/23/2022 CT Abd/pelv w/c: Stable post ablation cavity with surrounding scarring in hepatic segment 8  Stable multiple arterial enhancing lesions   Again seen is an ablation cavity measuring 2 0 x 1 6 cm (previously 2 3 x 1 4 cm) in segment 8  Additional index lesions as follows (series 2):  10 mm (previously 11 mm) segment 8 lesion on image #17, stable  9 mm (previously 12 mm) segment 8 lesion on image #25, stable    12 mm (previously 12 mm) segment 7 lesion on image #22, stable  17 x 9 mm (previously 18 x 10 mm) segment 7 lesion on image #28, stable  10 mm (previously 12 mm) subcapsular segment 7/8 lesion on image #43, stable  6 mm (previously 8 mm) subcapsular segment 2/3 lesion on image #38, stable  5 mm segment 2 subcapsular lesion on image #32, not well visualized on the previous study  2/24/2023 CT CAP w/c: The previously noted treated right hepatic lobe lesion and exophytic segment 6 lesion and additional subcentimeter lesion within the liver parenchyma are stable  No new retroperitoneal, abdominopelvic or mediastinal lymphadenopathy  Evaluation, Somewhat limited due to lack of  arterial phase images  No new mediastinal lymphadenopathy  History of Present Illiness:   David Tyler is a 65 y o  female with the above-noted HemOnc history who is here for management of metastatic neuroendocrine tumor with liver involvement  Patient had persistent diarrhea ongoing for 2 years  Colonoscopy showed a polyp, biopsy confirmed GI carcinoid tumor  Ki-67 less than 3%  Patient underwent right hemicolectomy, cholecystectomy on August 22, 2029 under the care of Dr Devi Dai  Final pathology showed T3 N1 M1, grade 1, 3/24 lymph node positive for metastasis  Patient also underwent intraoperative ablation of liver metastasis  Status post Sandostatin monthly given between October 2019 to November 2021  Currently, patient is on observation  CT scan in May, 2022 showed stable disease  Interval events:   She has intermittent diarrhea depending on her diet (fatty)  She's only needed to use Immodium on certain days  No longer having chest palpitations  Denies night sweats or flushing of the skin during the day  Denies F/C, N/V, SOB, CP, LH, HA, gen weakness, hematuria, melena, hematochezia, cough, abd pain, dysuria, change in urinary frequency  She has stable appetite and weight      ROS: A 10-point of review of systems is obtained and other than the above is noncontributory  Objective:   VITALS:   /88   Pulse 83   Temp 98 °F (36 7 °C) (Temporal)   Resp 16   Ht 5' 6" (1 676 m)   Wt 78 kg (172 lb)   SpO2 97%   BMI 27 76 kg/m²     Weight at last visit: 173 lbs  Weight today: 172 lbs    Physical EXAM:  General:  Alert, cooperative, no distress, appears stated age  Head:  Normocephalic, without obvious abnormality, atraumatic  Eyes:  Conjunctivae/corneas clear  EOMs intact  No evidence of conjunctivitis     Throat: Lips, mucosa, and tongue normal   No bleeding from mouth  Neck: Supple, symmetrical, trachea midline    Lungs:   Clear to auscultation bilaterally  Respiratory effort easy, nonlabored    Heart:  Regular rate and rhythm, +S1, S2    Abdomen:   Soft, non-tender,nondistended  Bowel sounds normal      Extremities:  Lymphatics: Extremities normal, atraumatic, no cyanosis or edema  No cervical, axillary or inguinal adenopathy   Skin: Skin color, texture, turgor normal  No rashes  Neurologic: AAO   No focal neuro deficits noted b/l       Allergies   Allergen Reactions   • Prednisone Other (See Comments) and Confusion     Psychosis        Past Medical History:   Diagnosis Date   • Cancer ([facility] [address] ) 2019    neuroendocrine tumor   • Psychiatric disorder    • Scoliosis    • Strep throat    • Weight gain        Past Surgical History:   Procedure Laterality Date   • BREAST BIOPSY Right 2016    benign   • BREAST BIOPSY Right 2021    benign   • BREAST CYST EXCISION Right     in 30's-benign   • CHOLECYSTECTOMY N/A 8/22/2019    Procedure: CHOLECYSTECTOMY;  Surgeon: Georgie Hashimoto, MD;  [facility];  Service: Surgical Oncology   • COLECTOMY TOTAL Right 8/22/2019    Procedure: HEMICOLECTOMY;  Surgeon: Georgie Hashimoto, MD;  [facility];  Service: Surgical Oncology   • HYSTERECTOMY  2010   • LAPAROTOMY N/A 8/22/2019    Procedure: LAPAROTOMY EXPLORATORY;  Surgeon: Georgie Hashimoto, MD;  [facility];  Service: Surgical Oncology   • PARTIAL HYSTERECTOMY     • KS ABLTJ 1/> LVR BRYNN PRQ RF N/A 8/22/2019    Procedure: ABLATION MICROWAVE; INTRAOPERATIVE ULTRASOUND OF THE LIVER;  Surgeon: Cristofer Pace MD;  [facility];  Service: Surgical Oncology   • KS REPAIR FIRST ABDOMINAL WALL HERNIA N/A 7/19/2021    Procedure: INCISIONAL HERNIA REPAIR;  Surgeon: Kyler Melendez DO;  [facility];  Service: General   • TUBAL LIGATION     • US GUIDED BREAST BIOPSY RIGHT COMPLETE Right 3/19/2021       Family History   Problem Relation Age of Onset   • Thyroid cancer Mother 76   • Multiple myeloma Father 68   • No Known Problems Maternal Grandmother    • No Known Problems Paternal Grandmother    • No Known Problems Maternal Aunt    • No Known Problems Paternal Aunt    • No Known Problems Paternal Aunt    • No Known Problems Paternal Aunt    • Breast cancer Other         age unknown   • No Known Problems Brother        Social History     Socioeconomic History   • Marital status: /Civil Union     Spouse name: Not on file   • Number of children: Not on file   • Years of education: Not on file   • Highest education level: Not on file   Occupational History   • Not on file   Tobacco Use   • Smoking status: Never   • Smokeless tobacco: Never   Vaping Use   • Vaping Use: Never used   Substance and Sexual Activity   • Alcohol use: Not Currently   • Drug use: Not Currently   • Sexual activity: Yes   Other Topics Concern   • Not on file   Social History Narrative      2 sons    2 Grandkids     Social Determinants of Health     Financial Resource Strain: Not on file   Food Insecurity: Not on file   Transportation Needs: Not on file   Physical Activity: Not on file   Stress: Not on file   Social Connections: Not on file   Intimate Partner Violence: Not on file   Housing Stability: Not on file       No current outpatient medications on file  No current facility-administered medications for this visit         (Not in a hospital admission)      DATA REVIEW:    Pathology Result:    Final Diagnosis   Date Value Ref Range Status   03/19/2021   Final    A  Breast, Right, [address], Biopsy:  - Proliferative fibrocystic changes, without atypia, including usual ductal hyperplasia, apocrine metaplasia and stromal fibrosis  - Intraductal microcalcifications are present   - No evidence of malignancy  08/22/2019   Final    A  Terminal ileum, ascending colon, and appendix, right hemicolectomy:  - 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), G1, see synoptic report for details  - Lymphovascular and perineural invasion are present  - Colon with no pathologic abnormality   - Appendix with distal fibrous obliteration   - All margins are negative for tumor   - Three of twenty-four lymph nodes are positive for tumor (3/24)  Comment: Immunohistochemistry was performed on block A8  The tumor cells are positive for synaptophysin, chromogranin and CD56  Ki-67 shows mitotic proliferative index of approximately 1%  Positive and negative controls were evaluated and were appropriate  Immunohistochemistry for desmin is performed on tissue block A11 and confirms vascular invasion    A  Gallbladder, cholecystectomy:  - Gallbladder with no significant pathologic abnormality  - Negative for malignancy  Intradepartmental consultation is in agreement  Interpretation performed at [address] [address]       06/07/2019   Final    A  Terminal ileum, polyp, endoscopic biopsy:  - Neuroendocrine tumor (carcinoid) involving small intestinal lamina propria and muscularis    mucosae  See note  B  Colon, random endoscopic biopsy:  - Colonic mucosa with no pathologic alteration    - No evidence of lymphocytic or collagenous colitis      Note: The H & E and immunohistochemistry stained slides were sent to Dr Nena Rasheed, Department of pathology at [facility], for her expert opinion and review, and the above reflects her diagnosis  Her letter with diagnosis reads as below  Dr Zach Franco was notified of preliminary findings via phone on 6/11/2019 at 3:10 pm             Image Results: They are reviewed and documented in Hematology/Oncology history    CT chest abdomen pelvis w contrast  Narrative: CT CHEST, ABDOMEN AND PELVIS WITH IV CONTRAST    INDICATION:   D3A 012: Benign carcinoid tumor of the ileum  COMPARISON:  August 23, 2022 CT from July 26, 2021    TECHNIQUE: CT examination of the chest, abdomen and pelvis was performed  Axial, sagittal, and coronal 2D reformatted images were created from the source data and submitted for interpretation  Radiation dose length product (DLP) for this visit:  973 mGy-cm   This examination, like all CT scans performed in the [facility], was performed utilizing techniques to minimize radiation dose exposure, including the use of iterative   reconstruction and automated exposure control  IV Contrast:  100 mL of iohexol (OMNIPAQUE)  Enteric Contrast: Enteric contrast was administered  FINDINGS: Limited study as arterial phase images have not been obtained    CHEST    LUNGS: No acute consolidation  Trachea and central bronchi are patent  No suspicious lung nodule or mass seen  Nonspecific areas of groundglass density in the right lower lobe, left lower lobe     PLEURA:  Unremarkable  HEART/GREAT VESSELS: Heart is unremarkable for patient's age  No thoracic aortic aneurysm  Ascending aorta measures 3 6 cm    MEDIASTINUM AND DARREL:  Unremarkable      CHEST WALL AND LOWER NECK:  No significant mediastinal lymph node enlargement seen    ABDOMEN    LIVER/BILIARY TREE:  Again noted is a hypodensity in the segment 8 of the liver measuring 1 9 cm x 1 3 cm, stable  Additional hypodensity in the segment 6 of the liver measuring 1 3 cm, stable  Additional hypervascular lesions were described on the previous study of August 23, 2022  These are occult, as arterial phase images have not been obtained  Vague Enhancing area segment 7, image 103 series 2, stable, seen in image 103 series 2  Enhancing the focal area segment 7 image 83 series 2 measuring 1 cm, stable since previous study of July 26, 2021  GALLBLADDER:  No calcified gallstones  No pericholecystic inflammatory change  SPLEEN:  Unremarkable  PANCREAS:  Unremarkable  ADRENAL GLANDS:  Unremarkable  KIDNEYS/URETERS:  Unremarkable  No hydronephrosis  A septated the lesion seen in the right kidney lower pole measuring about 8 mm, stable  STOMACH AND BOWEL:  Diverticulosis seen  No abnormal dilation small bowel loops seen  A ileocolic anastomosis is seen lying in the right upper abdomen    APPENDIX:  Surgically absent    ABDOMINOPELVIC CAVITY:  No ascites  No pneumoperitoneum  No lymphadenopathy  VESSELS:  Unremarkable for patient's age  PELVIS    REPRODUCTIVE ORGANS:  Uterus surgically acute  Small left ovary seen, stable    URINARY BLADDER:  Unremarkable  ABDOMINAL WALL/INGUINAL REGIONS:  Unremarkable  OSSEOUS STRUCTURES:  No acute fracture or destructive osseous lesion  Impression: The previously noted treated right hepatic lobe lesion and exophytic segment 6 lesion and additional subcentimeter lesion within the liver parenchyma are stable    No new retroperitoneal, abdominopelvic or mediastinal lymphadenopathy  Evaluation, Somewhat limited due to lack of  arterial phase images    No new mediastinal lymphadenopathy    Workstation performed: [id number]        LABS:  Lab data are reviewed and documented in HemOnc history  No results found for this or any previous visit (from the past 48 hour(s))            Ana Escobedo  3/22/2023, 2:06 PM

## 2023-03-17 ENCOUNTER — APPOINTMENT (OUTPATIENT)
Dept: LAB | Facility: CLINIC | Age: 63
End: 2023-03-17

## 2023-03-17 DIAGNOSIS — D3A.012 CARCINOID TUMOR OF ILEUM, UNSPECIFIED WHETHER MALIGNANT: ICD-10-CM

## 2023-03-17 LAB
ALBUMIN SERPL BCP-MCNC: 4 G/DL (ref 3.5–5)
ALP SERPL-CCNC: 66 U/L (ref 46–116)
ALT SERPL W P-5'-P-CCNC: 34 U/L (ref 12–78)
ANION GAP SERPL CALCULATED.3IONS-SCNC: 4 MMOL/L (ref 4–13)
AST SERPL W P-5'-P-CCNC: 16 U/L (ref 5–45)
BASOPHILS # BLD AUTO: 0.08 THOUSANDS/ÂΜL (ref 0–0.1)
BASOPHILS NFR BLD AUTO: 1 % (ref 0–1)
BILIRUB SERPL-MCNC: 0.4 MG/DL (ref 0.2–1)
BUN SERPL-MCNC: 17 MG/DL (ref 5–25)
CALCIUM SERPL-MCNC: 9.8 MG/DL (ref 8.3–10.1)
CHLORIDE SERPL-SCNC: 110 MMOL/L (ref 96–108)
CO2 SERPL-SCNC: 25 MMOL/L (ref 21–32)
CREAT SERPL-MCNC: 0.64 MG/DL (ref 0.6–1.3)
EOSINOPHIL # BLD AUTO: 0.31 THOUSAND/ÂΜL (ref 0–0.61)
EOSINOPHIL NFR BLD AUTO: 4 % (ref 0–6)
ERYTHROCYTE [DISTWIDTH] IN BLOOD BY AUTOMATED COUNT: 11.8 % (ref 11.6–15.1)
GFR SERPL CREATININE-BSD FRML MDRD: 96 ML/MIN/1.73SQ M
GLUCOSE P FAST SERPL-MCNC: 105 MG/DL (ref 65–99)
HCT VFR BLD AUTO: 38.8 % (ref 34.8–46.1)
HGB BLD-MCNC: 13.3 G/DL (ref 11.5–15.4)
IMM GRANULOCYTES # BLD AUTO: 0.04 THOUSAND/UL (ref 0–0.2)
IMM GRANULOCYTES NFR BLD AUTO: 1 % (ref 0–2)
LYMPHOCYTES # BLD AUTO: 1.95 THOUSANDS/ÂΜL (ref 0.6–4.47)
LYMPHOCYTES NFR BLD AUTO: 24 % (ref 14–44)
MCH RBC QN AUTO: 31.4 PG (ref 26.8–34.3)
MCHC RBC AUTO-ENTMCNC: 34.3 G/DL (ref 31.4–37.4)
MCV RBC AUTO: 92 FL (ref 82–98)
MONOCYTES # BLD AUTO: 0.53 THOUSAND/ÂΜL (ref 0.17–1.22)
MONOCYTES NFR BLD AUTO: 7 % (ref 4–12)
NEUTROPHILS # BLD AUTO: 5.14 THOUSANDS/ÂΜL (ref 1.85–7.62)
NEUTS SEG NFR BLD AUTO: 63 % (ref 43–75)
NRBC BLD AUTO-RTO: 0 /100 WBCS
PLATELET # BLD AUTO: 290 THOUSANDS/UL (ref 149–390)
PMV BLD AUTO: 10.9 FL (ref 8.9–12.7)
POTASSIUM SERPL-SCNC: 3.9 MMOL/L (ref 3.5–5.3)
PROT SERPL-MCNC: 7.4 G/DL (ref 6.4–8.4)
RBC # BLD AUTO: 4.23 MILLION/UL (ref 3.81–5.12)
SODIUM SERPL-SCNC: 139 MMOL/L (ref 135–147)
WBC # BLD AUTO: 8.05 THOUSAND/UL (ref 4.31–10.16)

## 2023-03-22 ENCOUNTER — OFFICE VISIT (OUTPATIENT)
Dept: HEMATOLOGY ONCOLOGY | Facility: CLINIC | Age: 63
End: 2023-03-22

## 2023-03-22 VITALS
HEART RATE: 83 BPM | RESPIRATION RATE: 16 BRPM | OXYGEN SATURATION: 97 % | WEIGHT: 172 LBS | BODY MASS INDEX: 27.64 KG/M2 | DIASTOLIC BLOOD PRESSURE: 88 MMHG | SYSTOLIC BLOOD PRESSURE: 142 MMHG | HEIGHT: 66 IN | TEMPERATURE: 98 F

## 2023-03-22 DIAGNOSIS — C7A.012 MALIGNANT CARCINOID TUMOR OF ILEUM (HCC): Primary | ICD-10-CM

## 2023-04-25 ENCOUNTER — TELEPHONE (OUTPATIENT)
Dept: HEMATOLOGY ONCOLOGY | Facility: CLINIC | Age: 63
End: 2023-04-25

## 2023-04-25 NOTE — TELEPHONE ENCOUNTER
Made 2nd attempt to reschedule patients upcoming visit with Dr Krissy Pearson as he will not be in the office the day of            I left a voicemail detailing this and instructing patient to call back [facility] phone number to arrange a new follow up appointment

## 2023-04-25 NOTE — TELEPHONE ENCOUNTER
Appointment Change  Cancel, Reschedule, Change to Virtual      Who are you speaking with? Patient   If it is not the patient, are they listed on an active communication consent form? N/A   Which provider is the appointment scheduled with? Dr Alla Eisenberg   When is the appointment scheduled? Please list date and time  09/21/2023 @11AM    At which location is the appointment scheduled to take place[facility]   Was the appointment rescheduled or changed from an in person visit to a virtual visit? If so, please list the details of the change  Yes, 10/11/2023 @2:20PM    What is the reason for the appointment change? Provider will be on vacation   Was STAR transport scheduled for this visit? No   Does STAR transport need to be scheduled for the new visit (if applicable) No   Does the patient need an infusion appointment rescheduled? No   Does the patient have an infusion appointment scheduled? If so, when? No   Is the patient undergoing chemotherapy? No   Was the no-show policy reviewed for appointments being changed with less then 24 hours of notice?  No

## 2023-07-20 ENCOUNTER — OFFICE VISIT (OUTPATIENT)
Dept: LAB | Facility: HOSPITAL | Age: 63
End: 2023-07-20
Payer: COMMERCIAL

## 2023-07-20 DIAGNOSIS — Z79.899 ENCOUNTER FOR LONG-TERM (CURRENT) USE OF OTHER MEDICATIONS: ICD-10-CM

## 2023-07-20 LAB
ATRIAL RATE: 69 BPM
P AXIS: 69 DEGREES
PR INTERVAL: 150 MS
QRS AXIS: -26 DEGREES
QRSD INTERVAL: 86 MS
QT INTERVAL: 418 MS
QTC INTERVAL: 447 MS
T WAVE AXIS: 50 DEGREES
VENTRICULAR RATE: 69 BPM

## 2023-07-20 PROCEDURE — 93010 ELECTROCARDIOGRAM REPORT: CPT | Performed by: INTERNAL MEDICINE

## 2023-07-20 PROCEDURE — 93005 ELECTROCARDIOGRAM TRACING: CPT

## 2023-09-14 ENCOUNTER — APPOINTMENT (OUTPATIENT)
Dept: LAB | Facility: HOSPITAL | Age: 63
End: 2023-09-14
Attending: INTERNAL MEDICINE
Payer: COMMERCIAL

## 2023-09-14 ENCOUNTER — HOSPITAL ENCOUNTER (OUTPATIENT)
Dept: CT IMAGING | Facility: HOSPITAL | Age: 63
End: 2023-09-14
Attending: INTERNAL MEDICINE
Payer: COMMERCIAL

## 2023-09-14 ENCOUNTER — TELEPHONE (OUTPATIENT)
Dept: HEMATOLOGY ONCOLOGY | Facility: CLINIC | Age: 63
End: 2023-09-14

## 2023-09-14 DIAGNOSIS — C7A.012 MALIGNANT CARCINOID TUMOR OF ILEUM (HCC): ICD-10-CM

## 2023-09-14 LAB
ALBUMIN SERPL BCP-MCNC: 4.2 G/DL (ref 3.5–5)
ALP SERPL-CCNC: 61 U/L (ref 34–104)
ALT SERPL W P-5'-P-CCNC: 19 U/L (ref 7–52)
ANION GAP SERPL CALCULATED.3IONS-SCNC: 6 MMOL/L
AST SERPL W P-5'-P-CCNC: 15 U/L (ref 13–39)
BASOPHILS # BLD AUTO: 0.05 THOUSANDS/ÂΜL (ref 0–0.1)
BASOPHILS NFR BLD AUTO: 1 % (ref 0–1)
BILIRUB SERPL-MCNC: 0.45 MG/DL (ref 0.2–1)
BUN SERPL-MCNC: 18 MG/DL (ref 5–25)
CALCIUM SERPL-MCNC: 9.7 MG/DL (ref 8.4–10.2)
CHLORIDE SERPL-SCNC: 103 MMOL/L (ref 96–108)
CO2 SERPL-SCNC: 26 MMOL/L (ref 21–32)
CREAT SERPL-MCNC: 0.7 MG/DL (ref 0.6–1.3)
EOSINOPHIL # BLD AUTO: 0.09 THOUSAND/ÂΜL (ref 0–0.61)
EOSINOPHIL NFR BLD AUTO: 1 % (ref 0–6)
ERYTHROCYTE [DISTWIDTH] IN BLOOD BY AUTOMATED COUNT: 11.9 % (ref 11.6–15.1)
GFR SERPL CREATININE-BSD FRML MDRD: 93 ML/MIN/1.73SQ M
GLUCOSE P FAST SERPL-MCNC: 93 MG/DL (ref 65–99)
HCT VFR BLD AUTO: 36.6 % (ref 34.8–46.1)
HGB BLD-MCNC: 12.6 G/DL (ref 11.5–15.4)
IMM GRANULOCYTES # BLD AUTO: 0.04 THOUSAND/UL (ref 0–0.2)
IMM GRANULOCYTES NFR BLD AUTO: 0 % (ref 0–2)
LYMPHOCYTES # BLD AUTO: 1.57 THOUSANDS/ÂΜL (ref 0.6–4.47)
LYMPHOCYTES NFR BLD AUTO: 15 % (ref 14–44)
MCH RBC QN AUTO: 30.7 PG (ref 26.8–34.3)
MCHC RBC AUTO-ENTMCNC: 34.4 G/DL (ref 31.4–37.4)
MCV RBC AUTO: 89 FL (ref 82–98)
MONOCYTES # BLD AUTO: 0.61 THOUSAND/ÂΜL (ref 0.17–1.22)
MONOCYTES NFR BLD AUTO: 6 % (ref 4–12)
NEUTROPHILS # BLD AUTO: 8.3 THOUSANDS/ÂΜL (ref 1.85–7.62)
NEUTS SEG NFR BLD AUTO: 77 % (ref 43–75)
NRBC BLD AUTO-RTO: 0 /100 WBCS
PLATELET # BLD AUTO: 260 THOUSANDS/UL (ref 149–390)
PMV BLD AUTO: 10.2 FL (ref 8.9–12.7)
POTASSIUM SERPL-SCNC: 4.6 MMOL/L (ref 3.5–5.3)
PROT SERPL-MCNC: 7.2 G/DL (ref 6.4–8.4)
RBC # BLD AUTO: 4.11 MILLION/UL (ref 3.81–5.12)
SODIUM SERPL-SCNC: 135 MMOL/L (ref 135–147)
WBC # BLD AUTO: 10.66 THOUSAND/UL (ref 4.31–10.16)

## 2023-09-14 PROCEDURE — 80053 COMPREHEN METABOLIC PANEL: CPT

## 2023-09-14 PROCEDURE — 71260 CT THORAX DX C+: CPT

## 2023-09-14 PROCEDURE — 85025 COMPLETE CBC W/AUTO DIFF WBC: CPT

## 2023-09-14 PROCEDURE — G1004 CDSM NDSC: HCPCS

## 2023-09-14 PROCEDURE — 74177 CT ABD & PELVIS W/CONTRAST: CPT

## 2023-09-14 PROCEDURE — 36415 COLL VENOUS BLD VENIPUNCTURE: CPT

## 2023-09-14 RX ADMIN — IOHEXOL 100 ML: 350 INJECTION, SOLUTION INTRAVENOUS at 11:59

## 2023-09-14 NOTE — TELEPHONE ENCOUNTER
Appointment Change  Cancel, Reschedule, Change to Virtual      Who are you speaking with? Patient   If it is not the patient, is the caller listed on the communication consent form? N/A   Which provider is the appointment scheduled with? Dr. Herrera Avery   When was the original appointment scheduled? Please list date and time 10/11/23 220   At which location is the appointment scheduled to take place? [facility]   Was the appointment rescheduled? Was the appointment changed from an in person visit to a virtual visit? If so, please list the details of the change. 10/19/23 940   What is the reason for the appointment change? provider       Was STAR transport scheduled? N/A   Does STAR transport need to be scheduled for the new visit (if applicable) N/A   Does the patient need an infusion appointment rescheduled? N/A   Does the patient have an upcoming infusion appointment scheduled? If so, when? No   Is the patient undergoing chemotherapy? N/A   For appointments cancelled with less than 24 hours:  Was the no-show policy reviewed?  N/A

## 2023-09-14 NOTE — TELEPHONE ENCOUNTER
I called [facility] regarding an appointment that they have scheduled with Dr. Domingo Arzate scheduled on 10/11/23     I left a voicemail explaining to patient that this appointment will need to be rescheduled due to a change in the providers schedule. Patient was advised to call [facility] to reschedule. A [employer] message (if applicable) has been sent to patient relaying the above information and advising patient to call [facility] and reschedule their appointment. Patient can VALDO to Dr. Nish Mckay or Dr. Keith Spann. If desired, patient may schedule with Dr. Domingo Arzate in [facility] or next available in [place].

## 2023-09-21 PROBLEM — C7B.02 METASTATIC MALIGNANT CARCINOID TUMOR TO LIVER (HCC): Status: RESOLVED | Noted: 2019-07-16 | Resolved: 2023-09-21

## 2023-09-27 ENCOUNTER — HOSPITAL ENCOUNTER (OUTPATIENT)
Dept: MAMMOGRAPHY | Facility: CLINIC | Age: 63
Discharge: HOME | End: 2023-09-27
Payer: COMMERCIAL

## 2023-09-27 VITALS — HEIGHT: 66 IN | BODY MASS INDEX: 27.64 KG/M2 | WEIGHT: 172 LBS

## 2023-09-27 DIAGNOSIS — Z12.31 VISIT FOR SCREENING MAMMOGRAM: ICD-10-CM

## 2023-09-27 PROCEDURE — 77063 BREAST TOMOSYNTHESIS BI: CPT

## 2023-09-27 PROCEDURE — 77067 SCR MAMMO BI INCL CAD: CPT

## 2023-10-09 NOTE — PROGRESS NOTES
Hematology/Oncology Progress Note    Date of Service: 10/19/2023    [facility]  [facility]ATOLOG[facility]   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 Patient underwent right hemicolectomy and intraoperative liver lesion ablation in 2019. pT3 N1M1, grade 1, 3/24 lymph node positive for metastasis. Status post monthly Sandostatin patient 2019 to 2021. Patient achieved stable disease. She has been on observation. Last CT scan in 2023 showed stable liver metastasis. Patient is asymptomatic except occasional diarrhea. Patient takes Imodium prn. Will continue to monitor patient. Diarrhea due to neuroendocrine tumor. Patient can continue disease Imodium as needed. Sandostatin can be resumed if patient have uncontrolled diarrhea. Discussion of decision making     I personally reviewed the following lab results, the image studies, pathology, other specialty/physicians consult notes and recommendations, and outside medical records from [facility]. I had a lengthy discussion with the patient and shared the work-up findings. We discussed the diagnosis and management plan as below. I spent 34 minutes reviewing the records (labs, clinician notes, outside records, medical history, ordering medicine/tests/procedures, interpreting the imaging/labs previously done) and coordination of care as well as direct time with the patient today, of which greater than 50% of the time was spent in counseling and coordination of care with the patient/family.        Plan/Labs  -CBC, CMP in 12 months  -CT CAP w/c in 12 months        Follow Up: 12 months     All questions were answered to the patient's satisfaction during this encounter. The patient knows the contact information for our office and knows to reach out for any relevant concerns related to this encounter. They are to call for any temperature 100.4 or higher, new symptoms including but not restricted to shaking chills, decreased appetite, nausea, vomiting, diarrhea, increased fatigue, shortness of breath or chest pain, confusion, and not feeling the strength to come to the clinic. For all other listed problems and medical diagnosis in their chart - they are managed by PCP and/or other specialists, which the patient acknowledges. Thank you very much for your consultation and making us a part of this patient's care. We are continuing to follow closely with you. Please do not hesitate to reach out to me with any additional questions or concerns. Ana Recio MD  Hematology & Medical Oncology Staff Physician     Disclaimer: This document was prepared using [employer] Direct technology. If a word or phrase is confusing, or does not make sense, this is likely due to recognition error which was not discovered during the providers review. If you believe an error has occurred, please Contact me through [employer] service for nighat? cation. AJCC 8th Edition Cancer Stage :      Cancer Staging  Carcinoid tumor of ileum  Staging form: Neuroendocrine Tumor - Jejunum/Ileum, AJCC 8th Edition  - Pathologic stage from 8/22/2019: Stage IV (pT3, pN1, cM1) - Unsigned  Histologic grade (G): G1  Histologic grading system: 3 grade system  Sites of metastasis: Liver  Lymph-vascular invasion (LVI): BOTH lymphatic and small vessel AND venous (large vessel) invasion      Hematology/Oncology History:   - Previously seen by Dr. Katy Pace     1. GI carcinoid tumor, T3N1M1  G1  - presented with diarrhea ongoing for 2 years.   Colonoscopy showed polyps, biopsy showed GI carcinoid tumor.    - Ki 67 less than 3 percent. Low to intermediate grade     -  PET-CT showed intra-abdominal lymphadenopathy and liver leison. Cancer marker all normal before surgery     - 8/22/2019 : s/p Right hemicolectomy, cholecystectomy, 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); Found Lymphovascular and perineural invasion are present;   T3N1M1  G1;  3/24 LN  - 8 / 22/2019 :  Intraoperative  liver lesion ablation  - 4/2021 : CT C/a/P Showed questionable liver lesion. MRI showed liver lesion is stable. - 7/2021:  CT chest abdomen pelvic showed stable liver lesion. Status post hernia surgery in 07/19/2021  - 11/2021: CT abd/pelvis shows stable hepatic lesions. Patient saw Dr. Amy Do afterwards and Sandostatin Q monthly was stopped. She was on this - 11/2021. Now on observation. Oncology History   Carcinoid tumor of ileum   6/7/2019 Initial Diagnosis     Carcinoid tumor of ileum      8/22/2019 Surgery     Right hemicolectomy, cholecystectomy  - 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)  - Lymphovascular and perineural invasion are present  T3N1M1  G1  3/24 LN         May 19, 2022 CT scan abdomen pelvis with contrast:  IMPRESSION:     Multiple enhancing liver lesions again noted as detailed above, not significantly changed compared to the previous exam.  No additional suspicious lesions/metastatic disease in the abdomen or pelvis. Ancillary findings detailed above.  -8/23/2022 CT Abd/pelv w/c: Stable post ablation cavity with surrounding scarring in hepatic segment 8. Stable multiple arterial enhancing lesions   Again seen is an ablation cavity measuring 2.0 x 1.6 cm (previously 2.3 x 1.4 cm) in segment 8. Additional index lesions as follows (series 2):  10 mm (previously 11 mm) segment 8 lesion on image #17, stable. 9 mm (previously 12 mm) segment 8 lesion on image #25, stable.   12 mm (previously 12 mm) segment 7 lesion on image #22, stable. 17 x 9 mm (previously 18 x 10 mm) segment 7 lesion on image #28, stable. 10 mm (previously 12 mm) subcapsular segment 7/8 lesion on image #43, stable. 6 mm (previously 8 mm) subcapsular segment 2/3 lesion on image #38, stable. 5 mm segment 2 subcapsular lesion on image #32, not well visualized on the previous study  2/24/2023 CT CAP w/c: The previously noted treated right hepatic lobe lesion and exophytic segment 6 lesion and additional subcentimeter lesion within the liver parenchyma are stable. No new retroperitoneal, abdominopelvic or mediastinal lymphadenopathy. Evaluation, Somewhat limited due to lack of  arterial phase images. No new mediastinal lymphadenopathy  9/14/2023 CT CAP w/c: Stable appearance of liver lesions. Stable appearance of renal cystic lesions, 1 of which seems to be septated at the lower pole of the right kidney, remaining 1 cm size  History of Present Illiness:   Jefe Garcia is a 65 y.o. female with the above-noted HemOnc history who is here for management of metastatic neuroendocrine tumor with liver involvement. Patient had persistent diarrhea ongoing for 2 years. Colonoscopy showed a polyp, biopsy confirmed GI carcinoid tumor. Ki-67 less than 3%. Patient underwent right hemicolectomy, cholecystectomy on August 22, 2029 under the care of Dr. Fercho Morrison. Final pathology showed T3 N1 M1, grade 1, 3/24 lymph node positive for metastasis. Patient also underwent intraoperative ablation of liver metastasis. Status post Sandostatin monthly given between October 2019 to November 2021. Currently, patient is on observation. CT scan in May, 2022 showed stable disease. Interval events:   She has intermittent diarrhea depending on her diet (fatty). She's only needed to use Immodium on certain days. No longer having chest palpitations. Denies night sweats or flushing of the skin during the day.  Denies F/C, N/V, SOB, CP, LH, HA, gen weakness, hematuria, melena, hematochezia, cough, abd pain, dysuria, change in urinary frequency. She has stable appetite and weight. ROS: A 10-point of review of systems is obtained and other than the above is noncontributory. Objective:   VITALS:   /78 (BP Location: Left arm, Patient Position: Sitting, Cuff Size: Standard)   Pulse 76   Temp 97.6 °F (36.4 °C) (Temporal)   Resp 16   Ht 5' 6" (1.676 m)   Wt 74.8 kg (165 lb)   SpO2 97%   BMI 26.63 kg/m²     Weight at last visit: 172 lbs  Weight today: 165 lbs lbs    Physical EXAM:  General:  Alert, cooperative, no distress, appears stated age. Head:  Normocephalic, without obvious abnormality, atraumatic. Eyes:  Conjunctivae/corneas clear. EOMs intact. No evidence of conjunctivitis     Throat: Lips, mucosa, and tongue normal.  No bleeding from mouth. Neck: Supple, symmetrical, trachea midline    Lungs:   Clear to auscultation bilaterally. Respiratory effort easy, nonlabored    Heart:  Regular rate and rhythm, +S1, S2    Abdomen:   Soft, non-tender,nondistended. Bowel sounds normal..     Extremities:  Lymphatics: Extremities normal, atraumatic, no cyanosis or edema. No cervical, axillary or inguinal adenopathy   Skin: Skin color, texture, turgor normal. No rashes. Neurologic: AAO.  No focal neuro deficits noted b/l       Allergies   Allergen Reactions    Prednisone Other (See Comments) and Confusion     Psychosis        Past Medical History:   Diagnosis Date    Cancer ([address]) 2019    neuroendocrine tumor    Psychiatric disorder     Scoliosis     Strep throat     Weight gain        Past Surgical History:   Procedure Laterality Date    BREAST BIOPSY Right 2016    benign    BREAST BIOPSY Right 2021    benign    BREAST CYST EXCISION Right     in 30's-benign    CHOLECYSTECTOMY N/A 8/22/2019    Procedure: CHOLECYSTECTOMY;  Surgeon: Nunu Joyner MD;  [facility];  Service: Surgical Oncology    COLECTOMY TOTAL Right 8/22/2019    Procedure: HEMICOLECTOMY;  Surgeon: Nunu Joyner MD; [facility];  Service: Surgical Oncology    HYSTERECTOMY  2010    LAPAROTOMY N/A 8/22/2019    Procedure: LAPAROTOMY EXPLORATORY;  Surgeon: Angelita Dasilva MD;  [facility];  Service: Surgical Oncology    PARTIAL HYSTERECTOMY      ME ABLTJ 1/> LVR BRYNN PRQ RF N/A 8/22/2019    Procedure: ABLATION MICROWAVE; INTRAOPERATIVE ULTRASOUND OF THE LIVER;  Surgeon: Angelita Dasilva MD;  [facility];  Service: Surgical Oncology    ME REPAIR FIRST ABDOMINAL WALL HERNIA N/A 7/19/2021    Procedure: INCISIONAL HERNIA REPAIR;  Surgeon: Unique Berg DO;  [facility];  Service: General    TUBAL LIGATION      US GUIDED BREAST BIOPSY RIGHT COMPLETE Right 3/19/2021       Family History   Problem Relation Age of Onset    Thyroid cancer Mother 76    Multiple myeloma Father 68    No Known Problems Maternal Grandmother     No Known Problems Paternal Grandmother     No Known Problems Maternal Aunt     No Known Problems Paternal Aunt     No Known Problems Paternal Aunt     No Known Problems Paternal Aunt     Breast cancer Other         age unknown    No Known Problems Brother        Social History     Socioeconomic History    Marital status: /Civil Union     Spouse name: Not on file    Number of children: Not on file    Years of education: Not on file    Highest education level: Not on file   Occupational History    Not on file   Tobacco Use    Smoking status: Never    Smokeless tobacco: Never   Vaping Use    Vaping Use: Never used   Substance and Sexual Activity    Alcohol use: Not Currently    Drug use: Not Currently    Sexual activity: Yes   Other Topics Concern    Not on file   Social History Narrative    .     2 sons    2 Grandkids     Social Determinants of Health     Financial Resource Strain: Not on file   Food Insecurity: Not on file   Transportation Needs: Not on file   Physical Activity: Not on file   Stress: Not on file   Social Connections: Not on file   Intimate Partner Violence: Not on file   Housing Stability: Not on file       Current Outpatient Medications   Medication Sig Dispense Refill    citalopram (CeleXA) 20 mg tablet       risperiDONE (RisperDAL) 2 mg tablet Take 2 mg by mouth daily at bedtime       No current facility-administered medications for this visit. (Not in a hospital admission)      DATA REVIEW:    Pathology Result:    Final Diagnosis   Date Value Ref Range Status   03/19/2021   Final    A. Breast, Right, [address], Biopsy:  - Proliferative fibrocystic changes, without atypia, including usual ductal hyperplasia, apocrine metaplasia and stromal fibrosis. - Intraductal microcalcifications are present.  - No evidence of malignancy. 08/22/2019   Final    A. Terminal ileum, ascending colon, and appendix, right hemicolectomy:  - 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), G1, see synoptic report for details. - Lymphovascular and perineural invasion are present. - Colon with no pathologic abnormality.  - Appendix with distal fibrous obliteration.  - All margins are negative for tumor.  - Three of twenty-four lymph nodes are positive for tumor (3/24). Comment: Immunohistochemistry was performed on block A8. The tumor cells are positive for synaptophysin, chromogranin and CD56. Ki-67 shows mitotic proliferative index of approximately 1%. Positive and negative controls were evaluated and were appropriate. Immunohistochemistry for desmin is performed on tissue block A11 and confirms vascular invasion    A. Gallbladder, cholecystectomy:  - Gallbladder with no significant pathologic abnormality  - Negative for malignancy. Intradepartmental consultation is in agreement. Interpretation performed at [place] [address]       06/07/2019   Final    A.  Terminal ileum, polyp, endoscopic biopsy:  - Neuroendocrine tumor (carcinoid) involving small intestinal lamina propria and muscularis    mucosae. See note. B. Colon, random endoscopic biopsy:  - Colonic mucosa with no pathologic alteration.   - No evidence of lymphocytic or collagenous colitis. Note: The H & E and immunohistochemistry stained slides were sent to Dr. Pito Mcneill, Department of pathology at [address] [place] [address], for her expert opinion and review, and the above reflects her diagnosis. Her letter with diagnosis reads as below. Dr. Pastor Shaw was notified of preliminary findings via phone on 6/11/2019 at 3:10 pm.            Image Results: They are reviewed and documented in Hematology/Oncology history    Mammo screening bilateral w 3d & cad  Narrative: DIAGNOSIS: Visit for screening mammogram     TECHNIQUE:  Digital screening mammography was performed. Computer Aided Detection   (CAD) analyzed all applicable images. COMPARISONS: Multiple prior exams dated between 8/16/2010 and 9/26/2022. RELEVANT HISTORY:   Family Breast Cancer History: History of breast cancer in Other. Family Medical History: Family medical history includes breast cancer in   other. Personal History: Hormone history includes birth control. Surgical history   includes breast biopsy, breast excisional biopsy, and hysterectomy. No   known relevant medical history. The patient is scheduled in a reminder system for screening mammography. 8-10% of cancers will be missed on mammography. Management of a palpable   abnormality must be based on clinical grounds. Patients will be notified   of their results via letter from our facility. Accredited by [employer] of [employer] and FDA. RISK ASSESSMENT:   5 Year Tyrer-Cuzick: 1.57 %  10 Year Tyrer-Cuzick: 3.16 %  Lifetime Tyrer-Cuzick: 7.62 %    TISSUE DENSITY:   The breasts are heterogeneously dense, which may obscure small masses. INDICATION: Darline Rodríguez is a 65 y.o. female presenting for screening   mammography.     FINDINGS:   Bilateral  There are no suspicious masses, grouped microcalcifications or areas of   unexplained architectural distortion. The skin and nipple areolar complex   are unremarkable. Biopsy marker clips are noted in the right breast.  A   few diffusely distributed calcifications are noted in each breast.  Impression: No mammographic evidence of malignancy. ASSESSMENT/BI-RADS CATEGORY:  Left: 2 - Benign  Right: 2 - Benign  Overall: 2 - Benign    RECOMMENDATION:       - Routine screening mammogram in 1 year for both breasts. Workstation ID: [id number]        LABS:  [facility]. No results found for this or any previous visit (from the past 48 hour(s)).           Ana Escobedo  10/19/2023, 9:49 AM

## 2023-10-19 ENCOUNTER — OFFICE VISIT (OUTPATIENT)
Dept: HEMATOLOGY ONCOLOGY | Facility: CLINIC | Age: 63
End: 2023-10-19
Payer: COMMERCIAL

## 2023-10-19 VITALS
TEMPERATURE: 97.6 F | DIASTOLIC BLOOD PRESSURE: 78 MMHG | BODY MASS INDEX: 26.52 KG/M2 | HEART RATE: 76 BPM | OXYGEN SATURATION: 97 % | WEIGHT: 165 LBS | SYSTOLIC BLOOD PRESSURE: 122 MMHG | RESPIRATION RATE: 16 BRPM | HEIGHT: 66 IN

## 2023-10-19 DIAGNOSIS — C7A.012 MALIGNANT CARCINOID TUMOR OF ILEUM (HCC): Primary | ICD-10-CM

## 2023-10-19 PROCEDURE — 99214 OFFICE O/P EST MOD 30 MIN: CPT | Performed by: INTERNAL MEDICINE

## 2023-10-19 RX ORDER — CITALOPRAM 20 MG/1
TABLET ORAL
COMMUNITY
Start: 2023-06-16

## 2023-10-19 RX ORDER — RISPERIDONE 2 MG/1
2 TABLET ORAL
COMMUNITY
Start: 2023-09-09

## 2023-11-20 NOTE — PROGRESS NOTES
Detail Level: Simple Lisa Marina 1960 female MRN: [id number]      ASSESSMENT/PLAN  Problem List Items Addressed This Visit        Other    Non-recurrent abdominal hernia without obstruction or gangrene - Primary    Relevant Orders    Ambulatory referral to General Surgery        Discussed various options for second opinion, including referral to another provider within  vs referral to [facility]  Pt would like to see an [facility] provider -- referral provided  Work note given to refrain from lifting until consult with second opinion  Future Appointments   Date Time Provider Lucero Arias   2/15/2021 10:45 AM MO STEREO RBC 1 MO RBC Mammo MO RBC   2/16/2021  3:30 PM MO INF QUICK CHAIR 1 MO Infusion MO MOB   4/9/2021 11:30 AM MO CT 1 MO CT MO HOSP   4/26/2021  1:20 PM Carmen Emery MD PhD HEM ONC STR Practice[facility]On[facility]   7/15/2021  3:15 PM Mitali Fernandes MD [facility] ONC ST Practice-Onc          SUBJECTIVE  CC: Follow-up ( hernia  needs work note and referral )      HPI:  Lisa Marina is a 62 y o  female who presents due to hernia  2/4: 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 -- saw [address] doctor that day, who said she should be restricted from lifting  2/8: Saw Dr Yobani Welch for further evaluation  He recommended against repair and stated she could return to full duty  Pt is concerned about returning to work because her job is quite physical requires regular lifting  She would like to seek a second opinion regarding possible repair of her hernia  Denies change in BMs  Review of Systems   Gastrointestinal: Positive for abdominal pain  Negative for constipation and diarrhea         Historical Information   The patient history was reviewed and updated as follows:    Past Medical History:   Diagnosis Date    Cancer [facility])     neuroendocrine tumor    Psychiatric disorder     Scoliosis     Strep throat     Weight gain      Past Surgical History:   Procedure Laterality Date    BREAST BIOPSY  BREAST LUMPECTOMY      benign    CHOLECYSTECTOMY N/A 8/22/2019    Procedure: CHOLECYSTECTOMY;  Surgeon: Marquise Saavedra MD;  [facility];  Service: Surgical Oncology    COLECTOMY TOTAL Right 8/22/2019    Procedure: HEMICOLECTOMY;  Surgeon: Marquise Saavedra MD;  [facility];  Service: Surgical Oncology    HYSTERECTOMY      LAPAROTOMY N/A 8/22/2019    Procedure: LAPAROTOMY EXPLORATORY;  Surgeon: Marquise Saavedra MD;  [facility];  Service: Surgical Oncology    PARTIAL HYSTERECTOMY      PA ABLTJ 1/> LVR BRYNN PRQ RF N/A 8/22/2019    Procedure: ABLATION MICROWAVE; INTRAOPERATIVE ULTRASOUND OF THE LIVER;  Surgeon: Marquise Saavedra MD;  [facility];  Service: Surgical Oncology    TUBAL LIGATION       Family History   Problem Relation Age of Onset    Thyroid cancer Mother 76    Multiple myeloma Father 68      Social History   Social History     Substance and Sexual Activity   Alcohol Use Not Currently    Frequency: Never    Binge frequency: Never     Social History     Substance and Sexual Activity   Drug Use Not Currently     Social History     Tobacco Use   Smoking Status Never Smoker   Smokeless Tobacco Never Used       Medications:     Current Outpatient Medications:     Brexpiprazole (REXULTI) 0 5 MG tablet, Take 0 5 mg by mouth daily at bedtime , Disp: , Rfl:     citalopram (CeleXA) 20 mg tablet, Take 1 tablet (20 mg total) by mouth daily (Patient taking differently: Take 20 mg by mouth daily at bedtime ), Disp: 30 tablet, Rfl: 2    octreotide (SandoSTATIN LAR DEPOT) 10 mg IM injection kit, Inject 10 mg into a muscle every 28 days, Disp: , Rfl:   Allergies   Allergen Reactions    Prednisone Other (See Comments) and Confusion     Psychosis        OBJECTIVE    Vitals:   Vitals:    02/09/21 0929   BP: 134/84   BP Location: Left arm   Patient Position: Sitting   Cuff Size: Adult   Pulse: 74   Resp: 18   Temp: 97 6 °F (36 4 °C)   SpO2: 95%   Weight: 78 3 kg (172 lb 9 6 oz) Height: 5' 6" (1 676 m)           Physical Exam  Constitutional:       General: She is not in acute distress  Appearance: Normal appearance  HENT:      Head: Normocephalic and atraumatic  Cardiovascular:      Rate and Rhythm: Normal rate and regular rhythm  Pulmonary:      Effort: Pulmonary effort is normal  No respiratory distress  Breath sounds: Normal breath sounds  Abdominal:      General: Bowel sounds are normal  There is no distension  Palpations: Abdomen is soft  Tenderness: There is abdominal tenderness (RLQ)  Skin:     General: Skin is warm and dry  Neurological:      General: No focal deficit present  Mental Status: She is alert     Psychiatric:         Mood and Affect: Mood normal                     Taylor Monaco DO  [facility]   2/9/2021  10:02 AM

## 2024-05-29 NOTE — RESTORATIVE TECHNICIAN NOTE
Restorative Specialist Mobility Note       Activity: Ambulate in reich, Ambulate in room, Bathroom privileges, Chair, Dangle, Stand at bedside(Educated/encouraged pt to ambulate with assistance 3-4 x's/day   Pt callbell, phone/tray within reach )     Assistive Device: Front wheel walker, Other (Comment)(NC O2 on 1L)       Ro Fernando BS, Restorative Technician, [employer]
Restorative Specialist Mobility Note       Activity: Ambulate in reich, Ambulate in room, Bathroom privileges, Chair, Dangle, Stand at bedside(Educated/encouraged pt to ambulate with assistance 3-4 x's/day  Assisted pt to the bathroom post ambulation   PCA Mallory present to further assist pt while in the bathroom )     Assistive Device: Front wheel walker       [employer] BS, Restorative Technician, [employer]
Speaking Coherently

## 2024-09-30 ENCOUNTER — HOSPITAL ENCOUNTER (OUTPATIENT)
Dept: MAMMOGRAPHY | Facility: CLINIC | Age: 64
Discharge: HOME | End: 2024-09-30
Payer: COMMERCIAL

## 2024-09-30 VITALS — WEIGHT: 165 LBS | BODY MASS INDEX: 26.52 KG/M2 | HEIGHT: 66 IN

## 2024-09-30 DIAGNOSIS — Z12.31 OTHER SCREENING MAMMOGRAM: ICD-10-CM

## 2024-09-30 PROCEDURE — 77063 BREAST TOMOSYNTHESIS BI: CPT

## 2024-09-30 PROCEDURE — 77067 SCR MAMMO BI INCL CAD: CPT

## (undated) DEVICE — FLOSEAL HEMOSTATIC MATRIX, 10 ML: Brand: FLOSEAL

## (undated) DEVICE — SUT MONOCRYL 4-0 PS-2 27 IN Y426H

## (undated) DEVICE — CHLORAPREP HI-LITE 26ML ORANGE

## (undated) DEVICE — MEDI-VAC YANK SUCT HNDL W/TPRD BULBOUS TIP: Brand: CARDINAL HEALTH

## (undated) DEVICE — PLUMEPEN PRO 10FT

## (undated) DEVICE — BETHLEHEM UNIVERSAL MINOR GEN: Brand: CARDINAL HEALTH

## (undated) DEVICE — 3000CC GUARDIAN II: Brand: GUARDIAN

## (undated) DEVICE — ADHESIVE SKIN HIGH VISCOSITY EXOFIN 1ML

## (undated) DEVICE — TRAY FOLEY 16FR URIMETER SURESTEP

## (undated) DEVICE — SUT SILK 0 SH 30 IN K834H

## (undated) DEVICE — SUT VICRYL 0 54 IN J207G

## (undated) DEVICE — PROXIMATE LINEAR CUTTER RELOAD, BLUE, 75MM: Brand: PROXIMATE

## (undated) DEVICE — GLOVE INDICATOR PI UNDERGLOVE SZ 8 BLUE

## (undated) DEVICE — SUT PROLENE 3-0 SH 36 IN 8522H

## (undated) DEVICE — DRAPE EQUIPMENT RF WAND

## (undated) DEVICE — SUT SILK 2-0 18 IN A185H

## (undated) DEVICE — SUT SILK 3-0 SH CR/8 18 IN C013D

## (undated) DEVICE — SUT POLYESTER TAPE D-G 8618-00

## (undated) DEVICE — NEEDLE 22 G X 1 1/2 SAFETY

## (undated) DEVICE — SURGICEL 4 X 8

## (undated) DEVICE — SUT VICRYL 2-0 SH 27 IN UNDYED J417H

## (undated) DEVICE — GAUZE SPONGES,16 PLY: Brand: CURITY

## (undated) DEVICE — INTENDED FOR TISSUE SEPARATION, AND OTHER PROCEDURES THAT REQUIRE A SHARP SURGICAL BLADE TO PUNCTURE OR CUT.: Brand: BARD-PARKER SAFETY BLADES SIZE 15, STERILE

## (undated) DEVICE — PROXIMATE RELOADABLE LINEAR CUTTER WITH SAFETY LOCK-OUT, 75MM: Brand: PROXIMATE

## (undated) DEVICE — LIGHT HANDLE COVER SLEEVE DISP BLUE STELLAR

## (undated) DEVICE — PAD GROUNDING ADULT

## (undated) DEVICE — BETHLEHEM MAJOR GENERAL PACK: Brand: CARDINAL HEALTH

## (undated) DEVICE — INTENDED FOR TISSUE SEPARATION, AND OTHER PROCEDURES THAT REQUIRE A SHARP SURGICAL BLADE TO PUNCTURE OR CUT.: Brand: BARD-PARKER SAFETY BLADES SIZE 10, STERILE

## (undated) DEVICE — SUT CHROMIC 0 BP-1 27 IN 47T

## (undated) DEVICE — PROXIMATE RELOADABLE LINEAR STAPLER, 60MM: Brand: PROXIMATE

## (undated) DEVICE — TOWEL SURG XR DETECT GREEN STRL RFD

## (undated) DEVICE — SUT VICRYL 3-0 SH 27 IN J416H

## (undated) DEVICE — Device

## (undated) DEVICE — SUT VICRYL 2-0 D-SPECIAL 27 IN D8114

## (undated) DEVICE — SUT SILK 3-0 SH 30 IN K832H

## (undated) DEVICE — LIGACLIP MCA MULTIPLE CLIP APPLIERS, 20 MEDIUM CLIPS: Brand: LIGACLIP

## (undated) DEVICE — GLOVE SRG BIOGEL ECLIPSE 8

## (undated) DEVICE — SUT SILK 2-0 SH CR/8 18 IN C012D

## (undated) DEVICE — PROXIMATE SKIN STAPLERS (35 WIDE) CONTAINS 35 STAINLESS STEEL STAPLES (FIXED HEAD): Brand: PROXIMATE

## (undated) DEVICE — VIAL DECANTER

## (undated) DEVICE — 3M™ TEGADERM™ TRANSPARENT FILM DRESSING FRAME STYLE, 1628, 6 IN X 8 IN (15 CM X 20 CM), 10/CT 8CT/CASE: Brand: 3M™ TEGADERM™

## (undated) DEVICE — BLANKET HYPOTHERMIA ADULT GAYMAR

## (undated) DEVICE — GLOVE SRG BIOGEL ORTHOPEDIC 8